# Patient Record
Sex: FEMALE | Race: BLACK OR AFRICAN AMERICAN | NOT HISPANIC OR LATINO | Employment: OTHER | URBAN - METROPOLITAN AREA
[De-identification: names, ages, dates, MRNs, and addresses within clinical notes are randomized per-mention and may not be internally consistent; named-entity substitution may affect disease eponyms.]

---

## 2017-01-18 ENCOUNTER — HOSPITAL ENCOUNTER (OUTPATIENT)
Dept: LAB | Facility: HOSPITAL | Age: 72
Setting detail: SPECIMEN
Discharge: HOME | End: 2017-01-18
Attending: INTERNAL MEDICINE | Admitting: INTERNAL MEDICINE

## 2017-01-18 LAB
ALBUMIN SERPL-MCNC: 3.4 G/DL (ref 3.5–4.8)
ALBUMIN/GLOB SERPL: 1.1 {RATIO} (ref 1–1.7)
ALP SERPL-CCNC: 70 IU/L (ref 32–91)
ALT SERPL-CCNC: 17 IU/L (ref 14–54)
ANION GAP SERPL CALC-SCNC: 11.3 MMOL/L (ref 10–20)
AST SERPL-CCNC: 20 IU/L (ref 15–41)
BILIRUB SERPL-MCNC: 0.6 MG/DL (ref 0.3–1.2)
BUN SERPL-MCNC: 25 MG/DL (ref 8–20)
BUN/CREAT SERPL: 20.8 (ref 5.4–26.2)
CALCIUM SERPL-MCNC: 9.6 MG/DL (ref 8.9–10.3)
CHLORIDE SERPL-SCNC: 109 MMOL/L (ref 101–111)
CHOLEST SERPL-MCNC: 111 MG/DL
CHOLEST/HDLC SERPL: 2.1 {RATIO}
CONV CO2: 24 MMOL/L (ref 22–32)
CONV LDL CHOLESTEROL DIRECT: 41 MG/DL (ref 0–100)
CONV TOTAL PROTEIN: 6.5 G/DL (ref 6.1–7.9)
CREAT UR-MCNC: 1.2 MG/DL (ref 0.4–1)
GLOBULIN UR ELPH-MCNC: 3.1 G/DL (ref 2.5–3.8)
GLUCOSE SERPL-MCNC: 79 MG/DL (ref 65–99)
HDLC SERPL-MCNC: 53 MG/DL
LDLC/HDLC SERPL: 0.8 {RATIO}
LIPID INTERPRETATION: NORMAL
POTASSIUM SERPL-SCNC: 5.3 MMOL/L (ref 3.6–5.1)
SODIUM SERPL-SCNC: 139 MMOL/L (ref 136–144)
TRIGL SERPL-MCNC: 75 MG/DL
VLDLC SERPL CALC-MCNC: 17.6 MG/DL

## 2017-05-03 ENCOUNTER — HOSPITAL ENCOUNTER (OUTPATIENT)
Dept: GENERAL RADIOLOGY | Facility: HOSPITAL | Age: 72
Discharge: HOME | End: 2017-05-03
Attending: FAMILY MEDICINE | Admitting: FAMILY MEDICINE

## 2017-05-25 ENCOUNTER — HOSPITAL ENCOUNTER (OUTPATIENT)
Dept: PAIN MEDICINE | Facility: HOSPITAL | Age: 72
Discharge: HOME | End: 2017-05-25
Attending: ANESTHESIOLOGY | Admitting: ANESTHESIOLOGY

## 2017-06-13 ENCOUNTER — HOSPITAL ENCOUNTER (OUTPATIENT)
Dept: LAB | Facility: HOSPITAL | Age: 72
Setting detail: SPECIMEN
Discharge: HOME | End: 2017-06-13
Attending: NURSE PRACTITIONER | Admitting: NURSE PRACTITIONER

## 2017-06-13 LAB
ALBUMIN SERPL-MCNC: 3.4 G/DL (ref 3.5–4.8)
ALBUMIN/GLOB SERPL: 0.9 {RATIO} (ref 1–1.7)
ALP SERPL-CCNC: 67 IU/L (ref 32–91)
ALT SERPL-CCNC: 13 IU/L (ref 14–54)
ANION GAP SERPL CALC-SCNC: 13.6 MMOL/L (ref 10–20)
AST SERPL-CCNC: 20 IU/L (ref 15–41)
BILIRUB SERPL-MCNC: 0.4 MG/DL (ref 0.3–1.2)
BUN SERPL-MCNC: 21 MG/DL (ref 8–20)
BUN/CREAT SERPL: 21 (ref 5.4–26.2)
CALCIUM SERPL-MCNC: 9.4 MG/DL (ref 8.9–10.3)
CHLORIDE SERPL-SCNC: 107 MMOL/L (ref 101–111)
CONV CO2: 26 MMOL/L (ref 22–32)
CONV TOTAL PROTEIN: 7 G/DL (ref 6.1–7.9)
CREAT UR-MCNC: 1 MG/DL (ref 0.4–1)
GLOBULIN UR ELPH-MCNC: 3.6 G/DL (ref 2.5–3.8)
GLUCOSE SERPL-MCNC: 106 MG/DL (ref 65–99)
POTASSIUM SERPL-SCNC: 4.6 MMOL/L (ref 3.6–5.1)
SODIUM SERPL-SCNC: 142 MMOL/L (ref 136–144)

## 2017-06-26 ENCOUNTER — HOSPITAL ENCOUNTER (OUTPATIENT)
Dept: PAIN MEDICINE | Facility: HOSPITAL | Age: 72
Discharge: HOME | End: 2017-06-26
Attending: ANESTHESIOLOGY | Admitting: ANESTHESIOLOGY

## 2017-06-26 LAB
AMPHETAMINES UR QL SCN: NEGATIVE
BZE UR QL SCN: NEGATIVE
CREAT 24H UR-MCNC: NORMAL MG/DL
METHADONE UR QL SCN: NEGATIVE
OPIATE CONFIRMATION URINE: NORMAL
THC SERPLBLD CFM-MCNC: NEGATIVE NG/ML

## 2017-07-03 ENCOUNTER — HOSPITAL ENCOUNTER (OUTPATIENT)
Dept: ORTHOPEDIC SURGERY | Facility: CLINIC | Age: 72
Discharge: HOME | End: 2017-07-03
Attending: PODIATRIST | Admitting: PODIATRIST

## 2017-07-27 ENCOUNTER — HOSPITAL ENCOUNTER (OUTPATIENT)
Dept: ORTHOPEDIC SURGERY | Facility: CLINIC | Age: 72
Discharge: HOME | End: 2017-07-27
Attending: ORTHOPAEDIC SURGERY | Admitting: ORTHOPAEDIC SURGERY

## 2017-08-04 ENCOUNTER — HOSPITAL ENCOUNTER (OUTPATIENT)
Dept: ORTHOPEDIC SURGERY | Facility: CLINIC | Age: 72
Setting detail: SPECIMEN
Discharge: HOME | End: 2017-08-04
Attending: PHYSICIAN ASSISTANT | Admitting: PHYSICIAN ASSISTANT

## 2017-08-23 ENCOUNTER — HOSPITAL ENCOUNTER (OUTPATIENT)
Dept: MAMMOGRAPHY | Facility: HOSPITAL | Age: 72
Discharge: HOME | End: 2017-08-23
Attending: PHYSICIAN ASSISTANT | Admitting: PHYSICIAN ASSISTANT

## 2017-09-11 ENCOUNTER — HOSPITAL ENCOUNTER (OUTPATIENT)
Dept: INFUSION THERAPY | Facility: HOSPITAL | Age: 72
Discharge: HOME | End: 2017-09-11
Attending: PHYSICIAN ASSISTANT | Admitting: PHYSICIAN ASSISTANT

## 2017-09-29 ENCOUNTER — HOSPITAL ENCOUNTER (OUTPATIENT)
Dept: WOUND CARE | Facility: HOSPITAL | Age: 72
Discharge: HOME | End: 2017-09-29
Attending: INTERNAL MEDICINE | Admitting: INTERNAL MEDICINE

## 2017-10-13 ENCOUNTER — HOSPITAL ENCOUNTER (OUTPATIENT)
Dept: WOUND CARE | Facility: HOSPITAL | Age: 72
Discharge: HOME | End: 2017-10-13
Attending: INTERNAL MEDICINE | Admitting: INTERNAL MEDICINE

## 2017-10-23 ENCOUNTER — HOSPITAL ENCOUNTER (OUTPATIENT)
Dept: CARDIOLOGY | Facility: HOSPITAL | Age: 72
Discharge: HOME | End: 2017-10-23
Attending: FAMILY MEDICINE | Admitting: FAMILY MEDICINE

## 2017-10-27 ENCOUNTER — HOSPITAL ENCOUNTER (OUTPATIENT)
Dept: FAMILY MEDICINE CLINIC | Facility: CLINIC | Age: 72
Setting detail: SPECIMEN
Discharge: HOME | End: 2017-10-27
Attending: FAMILY MEDICINE | Admitting: FAMILY MEDICINE

## 2017-10-27 ENCOUNTER — HOSPITAL ENCOUNTER (OUTPATIENT)
Dept: WOUND CARE | Facility: HOSPITAL | Age: 72
Discharge: HOME | End: 2017-10-27
Attending: NURSE PRACTITIONER | Admitting: NURSE PRACTITIONER

## 2017-10-27 LAB
BACTERIA SPEC AEROBE CULT: NORMAL
Lab: NORMAL
MICRO REPORT STATUS: NORMAL
SPECIMEN SOURCE: NORMAL

## 2017-11-10 ENCOUNTER — HOSPITAL ENCOUNTER (OUTPATIENT)
Dept: WOUND CARE | Facility: HOSPITAL | Age: 72
Discharge: HOME | End: 2017-11-10
Attending: NURSE PRACTITIONER | Admitting: NURSE PRACTITIONER

## 2017-12-05 ENCOUNTER — HOSPITAL ENCOUNTER (OUTPATIENT)
Dept: LAB | Facility: HOSPITAL | Age: 72
Setting detail: SPECIMEN
Discharge: HOME | End: 2017-12-05
Attending: INTERNAL MEDICINE | Admitting: INTERNAL MEDICINE

## 2017-12-05 LAB
ALBUMIN SERPL-MCNC: 3.4 G/DL (ref 3.5–4.8)
ALBUMIN/GLOB SERPL: 1 {RATIO} (ref 1–1.7)
ALP SERPL-CCNC: 45 IU/L (ref 32–91)
ALT SERPL-CCNC: 12 IU/L (ref 14–54)
ANION GAP SERPL CALC-SCNC: 10 MMOL/L (ref 10–20)
AST SERPL-CCNC: 17 IU/L (ref 15–41)
BILIRUB SERPL-MCNC: 0.4 MG/DL (ref 0.3–1.2)
BUN SERPL-MCNC: 39 MG/DL (ref 8–20)
BUN/CREAT SERPL: 19.5 (ref 5.4–26.2)
CALCIUM SERPL-MCNC: 8.8 MG/DL (ref 8.9–10.3)
CHLORIDE SERPL-SCNC: 108 MMOL/L (ref 101–111)
CHOLEST SERPL-MCNC: 116 MG/DL
CHOLEST/HDLC SERPL: 2.3 {RATIO}
CONV CO2: 26 MMOL/L (ref 22–32)
CONV LDL CHOLESTEROL DIRECT: 49 MG/DL (ref 0–100)
CONV TOTAL PROTEIN: 6.9 G/DL (ref 6.1–7.9)
CREAT UR-MCNC: 2 MG/DL (ref 0.4–1)
GLOBULIN UR ELPH-MCNC: 3.5 G/DL (ref 2.5–3.8)
GLUCOSE SERPL-MCNC: 92 MG/DL (ref 65–99)
HDLC SERPL-MCNC: 51 MG/DL
LDLC/HDLC SERPL: 1 {RATIO}
LIPID INTERPRETATION: NORMAL
POTASSIUM SERPL-SCNC: 5 MMOL/L (ref 3.6–5.1)
SODIUM SERPL-SCNC: 139 MMOL/L (ref 136–144)
TRIGL SERPL-MCNC: 81 MG/DL
VLDLC SERPL CALC-MCNC: 15.5 MG/DL

## 2017-12-12 ENCOUNTER — HOSPITAL ENCOUNTER (OUTPATIENT)
Dept: LAB | Facility: HOSPITAL | Age: 72
Setting detail: SPECIMEN
Discharge: HOME | End: 2017-12-12
Attending: INTERNAL MEDICINE | Admitting: INTERNAL MEDICINE

## 2017-12-13 LAB
ANION GAP SERPL CALC-SCNC: 10.6 MMOL/L (ref 10–20)
BUN SERPL-MCNC: 36 MG/DL (ref 8–20)
BUN/CREAT SERPL: 22.5 (ref 5.4–26.2)
CALCIUM SERPL-MCNC: 9.1 MG/DL (ref 8.9–10.3)
CHLORIDE SERPL-SCNC: 108 MMOL/L (ref 101–111)
CONV CO2: 24 MMOL/L (ref 22–32)
CREAT UR-MCNC: 1.6 MG/DL (ref 0.4–1)
GLUCOSE SERPL-MCNC: 84 MG/DL (ref 65–99)
POTASSIUM SERPL-SCNC: 4.6 MMOL/L (ref 3.6–5.1)
SODIUM SERPL-SCNC: 138 MMOL/L (ref 136–144)

## 2018-03-19 ENCOUNTER — HOSPITAL ENCOUNTER (OUTPATIENT)
Dept: LAB | Facility: HOSPITAL | Age: 73
Setting detail: SPECIMEN
Discharge: HOME | End: 2018-03-19
Attending: NURSE PRACTITIONER | Admitting: NURSE PRACTITIONER

## 2018-07-03 ENCOUNTER — HOSPITAL ENCOUNTER (OUTPATIENT)
Dept: FAMILY MEDICINE CLINIC | Facility: CLINIC | Age: 73
Setting detail: SPECIMEN
Discharge: HOME | End: 2018-07-03
Attending: FAMILY MEDICINE | Admitting: FAMILY MEDICINE

## 2018-07-03 LAB
BASOPHILS # BLD AUTO: 0 10*3/UL (ref 0–0.2)
BASOPHILS NFR BLD AUTO: 0 % (ref 0–2)
DIFFERENTIAL METHOD BLD: (no result)
EOSINOPHIL # BLD AUTO: 0.2 10*3/UL (ref 0–0.3)
EOSINOPHIL # BLD AUTO: 4 % (ref 0–3)
ERYTHROCYTE [DISTWIDTH] IN BLOOD BY AUTOMATED COUNT: 13.9 % (ref 11.5–14.5)
HCT VFR BLD AUTO: 32.5 % (ref 35–49)
HGB BLD-MCNC: 10.6 G/DL (ref 12–15)
IRON SERPL-MCNC: 66 UG/DL (ref 28–170)
LYMPHOCYTES # BLD AUTO: 1.6 10*3/UL (ref 0.8–4.8)
LYMPHOCYTES NFR BLD AUTO: 25 % (ref 18–42)
MCH RBC QN AUTO: 27.8 PG (ref 26–32)
MCHC RBC AUTO-ENTMCNC: 32.8 G/DL (ref 32–36)
MCV RBC AUTO: 84.9 FL (ref 80–94)
MONOCYTES # BLD AUTO: 0.5 10*3/UL (ref 0.1–1.3)
MONOCYTES NFR BLD AUTO: 8 % (ref 2–11)
NEUTROPHILS # BLD AUTO: 4 10*3/UL (ref 2.3–8.6)
NEUTROPHILS NFR BLD AUTO: 63 % (ref 50–75)
NRBC BLD AUTO-RTO: 0 /100{WBCS}
NRBC/RBC NFR BLD MANUAL: 0 10*3/UL
PLATELET # BLD AUTO: 238 10*3/UL (ref 150–450)
PMV BLD AUTO: 9.5 FL (ref 7.4–10.4)
RBC # BLD AUTO: 3.82 10*6/UL (ref 4–5.4)
WBC # BLD AUTO: 6.3 10*3/UL (ref 4.5–11.5)

## 2018-10-18 ENCOUNTER — HOSPITAL ENCOUNTER (OUTPATIENT)
Dept: LAB | Facility: HOSPITAL | Age: 73
Setting detail: SPECIMEN
Discharge: HOME | End: 2018-10-18
Attending: INTERNAL MEDICINE | Admitting: INTERNAL MEDICINE

## 2018-10-18 LAB
ALBUMIN SERPL-MCNC: 3 G/DL (ref 3.5–4.8)
ALBUMIN/GLOB SERPL: 0.9 {RATIO} (ref 1–1.7)
ALP SERPL-CCNC: 68 IU/L (ref 32–91)
ALT SERPL-CCNC: 17 IU/L (ref 14–54)
ANION GAP SERPL CALC-SCNC: 11.9 MMOL/L (ref 10–20)
AST SERPL-CCNC: 21 IU/L (ref 15–41)
BILIRUB SERPL-MCNC: 0.5 MG/DL (ref 0.3–1.2)
BUN SERPL-MCNC: 33 MG/DL (ref 8–20)
BUN/CREAT SERPL: 22 (ref 5.4–26.2)
CALCIUM SERPL-MCNC: 9 MG/DL (ref 8.9–10.3)
CHLORIDE SERPL-SCNC: 108 MMOL/L (ref 101–111)
CONV CO2: 24 MMOL/L (ref 22–32)
CONV MICROALBUM.,U,RANDOM: 1111 MG/L
CONV TOTAL PROTEIN: 6.4 G/DL (ref 6.1–7.9)
CREAT 24H UR-MCNC: 41.1 MG/DL
CREAT UR-MCNC: 1.5 MG/DL (ref 0.4–1)
GLOBULIN UR ELPH-MCNC: 3.4 G/DL (ref 2.5–3.8)
GLUCOSE SERPL-MCNC: 134 MG/DL (ref 65–99)
MICROALBUMIN/CREAT UR: 2703.2 UG/MG
POTASSIUM SERPL-SCNC: 3.9 MMOL/L (ref 3.6–5.1)
SODIUM SERPL-SCNC: 140 MMOL/L (ref 136–144)

## 2018-10-19 LAB
25(OH)D3 SERPL-MCNC: 22 NG/ML (ref 30–100)
HBA1C MFR BLD: 5.7 % (ref 0–5.6)

## 2018-12-06 ENCOUNTER — HOSPITAL ENCOUNTER (OUTPATIENT)
Dept: ULTRASOUND IMAGING | Facility: HOSPITAL | Age: 73
Discharge: HOME | End: 2018-12-06
Attending: INTERNAL MEDICINE | Admitting: INTERNAL MEDICINE

## 2018-12-13 ENCOUNTER — HOSPITAL ENCOUNTER (OUTPATIENT)
Dept: LAB | Facility: HOSPITAL | Age: 73
Discharge: HOME | End: 2018-12-13
Attending: INTERNAL MEDICINE | Admitting: INTERNAL MEDICINE

## 2018-12-13 LAB
ANION GAP SERPL CALC-SCNC: 11.5 MMOL/L (ref 10–20)
BACTERIA SPEC AEROBE CULT: NORMAL
BASOPHILS # BLD AUTO: 0 10*3/UL (ref 0–0.2)
BASOPHILS NFR BLD AUTO: 0 % (ref 0–2)
BILIRUB UR QL STRIP: NEGATIVE MG/DL
BUN SERPL-MCNC: 45 MG/DL (ref 8–20)
BUN/CREAT SERPL: 23.7 (ref 5.4–26.2)
CALCIUM SERPL-MCNC: 9.4 MG/DL (ref 8.9–10.3)
CASTS URNS QL MICRO: ABNORMAL /[LPF]
CHLORIDE SERPL-SCNC: 107 MMOL/L (ref 101–111)
COLOR UR: YELLOW
CONV BACTERIA IN URINE MICRO: ABNORMAL
CONV CLARITY OF URINE: CLEAR
CONV CO2: 23 MMOL/L (ref 22–32)
CONV HYALINE CASTS IN URINE MICRO: 2 /[LPF] (ref 0–5)
CONV PROTEIN IN URINE BY AUTOMATED TEST STRIP: 30 MG/DL
CONV SMALL ROUND CELLS: ABNORMAL /[HPF]
CONV UROBILINOGEN IN URINE BY AUTOMATED TEST STRIP: 0.2 MG/DL
CREAT 24H UR-MCNC: 43.2 MG/DL
CREAT UR-MCNC: 1.9 MG/DL (ref 0.4–1)
CULTURE INDICATED?: ABNORMAL
DIFFERENTIAL METHOD BLD: (no result)
EOSINOPHIL # BLD AUTO: 0.4 10*3/UL (ref 0–0.3)
EOSINOPHIL # BLD AUTO: 6 % (ref 0–3)
ERYTHROCYTE [DISTWIDTH] IN BLOOD BY AUTOMATED COUNT: 13.9 % (ref 11.5–14.5)
GLUCOSE SERPL-MCNC: 87 MG/DL (ref 65–99)
GLUCOSE UR QL: NEGATIVE MG/DL
HCT VFR BLD AUTO: 30.1 % (ref 35–49)
HGB BLD-MCNC: 10 G/DL (ref 12–15)
HGB UR QL STRIP: ABNORMAL
KETONES UR QL STRIP: NEGATIVE MG/DL
LEUKOCYTE ESTERASE UR QL STRIP: NEGATIVE
LYMPHOCYTES # BLD AUTO: 1.5 10*3/UL (ref 0.8–4.8)
LYMPHOCYTES NFR BLD AUTO: 20 % (ref 18–42)
Lab: NORMAL
MCH RBC QN AUTO: 29.3 PG (ref 26–32)
MCHC RBC AUTO-ENTMCNC: 33.3 G/DL (ref 32–36)
MCV RBC AUTO: 88.1 FL (ref 80–94)
MICRO REPORT STATUS: NORMAL
MONOCYTES # BLD AUTO: 0.6 10*3/UL (ref 0.1–1.3)
MONOCYTES NFR BLD AUTO: 8 % (ref 2–11)
NEUTROPHILS # BLD AUTO: 5.2 10*3/UL (ref 2.3–8.6)
NEUTROPHILS NFR BLD AUTO: 66 % (ref 50–75)
NITRITE UR QL STRIP: NEGATIVE
NRBC BLD AUTO-RTO: 0 /100{WBCS}
NRBC/RBC NFR BLD MANUAL: 0 10*3/UL
PH UR STRIP.AUTO: 5 [PH] (ref 4.5–8)
PLATELET # BLD AUTO: 205 10*3/UL (ref 150–450)
PMV BLD AUTO: 9 FL (ref 7.4–10.4)
POTASSIUM SERPL-SCNC: 4.5 MMOL/L (ref 3.6–5.1)
PROT UR-MCNC: 32 MG/DL
PROT/CREAT UR: 0.7 MG/MG (ref 0–22)
RBC # BLD AUTO: 3.41 10*6/UL (ref 4–5.4)
RBC #/AREA URNS HPF: 4 /[HPF] (ref 0–3)
SODIUM SERPL-SCNC: 137 MMOL/L (ref 136–144)
SP GR UR: 1.01 (ref 1–1.03)
SPECIMEN SOURCE: NORMAL
SPERM URNS QL MICRO: ABNORMAL /[HPF]
SQUAMOUS SPT QL MICRO: 4 /[HPF] (ref 0–5)
UNIDENT CRYS URNS QL MICRO: ABNORMAL /[HPF]
WBC # BLD AUTO: 7.7 10*3/UL (ref 4.5–11.5)
WBC #/AREA URNS HPF: 1 /[HPF] (ref 0–5)
YEAST SPEC QL WET PREP: ABNORMAL /[HPF]

## 2019-04-18 ENCOUNTER — HOSPITAL ENCOUNTER (OUTPATIENT)
Dept: LAB | Facility: HOSPITAL | Age: 74
Setting detail: SPECIMEN
Discharge: HOME | End: 2019-04-18
Attending: NURSE PRACTITIONER | Admitting: NURSE PRACTITIONER

## 2019-04-18 LAB
ALBUMIN SERPL-MCNC: 3.5 G/DL (ref 3.5–4.8)
ALBUMIN/GLOB SERPL: 0.9 {RATIO} (ref 1–1.7)
ALP SERPL-CCNC: 51 IU/L (ref 32–91)
ALT SERPL-CCNC: 46 IU/L (ref 14–54)
ANION GAP SERPL CALC-SCNC: 15.5 MMOL/L (ref 10–20)
AST SERPL-CCNC: 29 IU/L (ref 15–41)
BILIRUB SERPL-MCNC: 0.5 MG/DL (ref 0.3–1.2)
BUN SERPL-MCNC: 73 MG/DL (ref 8–20)
BUN/CREAT SERPL: 34.8 (ref 5.4–26.2)
CALCIUM SERPL-MCNC: 9 MG/DL (ref 8.9–10.3)
CHLORIDE SERPL-SCNC: 107 MMOL/L (ref 101–111)
CHOLEST SERPL-MCNC: 153 MG/DL
CHOLEST/HDLC SERPL: 2.6 {RATIO}
CONV CO2: 21 MMOL/L (ref 22–32)
CONV LDL CHOLESTEROL DIRECT: 67 MG/DL (ref 0–100)
CONV TOTAL PROTEIN: 7.3 G/DL (ref 6.1–7.9)
CREAT UR-MCNC: 2.1 MG/DL (ref 0.4–1)
GLOBULIN UR ELPH-MCNC: 3.8 G/DL (ref 2.5–3.8)
GLUCOSE SERPL-MCNC: 135 MG/DL (ref 65–99)
HDLC SERPL-MCNC: 58 MG/DL
LDLC/HDLC SERPL: 1.2 {RATIO}
LIPID INTERPRETATION: ABNORMAL
POTASSIUM SERPL-SCNC: 4.5 MMOL/L (ref 3.6–5.1)
SODIUM SERPL-SCNC: 139 MMOL/L (ref 136–144)
TRIGL SERPL-MCNC: 89 MG/DL
VLDLC SERPL CALC-MCNC: 28.4 MG/DL

## 2019-04-25 ENCOUNTER — HOSPITAL ENCOUNTER (OUTPATIENT)
Dept: LAB | Facility: HOSPITAL | Age: 74
Setting detail: SPECIMEN
Discharge: HOME | End: 2019-04-25
Attending: INTERNAL MEDICINE | Admitting: INTERNAL MEDICINE

## 2019-04-25 LAB
ANION GAP SERPL CALC-SCNC: 16.4 MMOL/L (ref 10–20)
BUN SERPL-MCNC: 58 MG/DL (ref 8–20)
BUN/CREAT SERPL: 30.5 (ref 5.4–26.2)
CALCIUM SERPL-MCNC: 9.1 MG/DL (ref 8.9–10.3)
CHLORIDE SERPL-SCNC: 106 MMOL/L (ref 101–111)
CONV CO2: 20 MMOL/L (ref 22–32)
CREAT UR-MCNC: 1.9 MG/DL (ref 0.4–1)
GLUCOSE SERPL-MCNC: 153 MG/DL (ref 65–99)
POTASSIUM SERPL-SCNC: 4.4 MMOL/L (ref 3.6–5.1)
SODIUM SERPL-SCNC: 138 MMOL/L (ref 136–144)

## 2019-04-30 ENCOUNTER — HOSPITAL ENCOUNTER (OUTPATIENT)
Dept: ULTRASOUND IMAGING | Facility: HOSPITAL | Age: 74
Discharge: HOME | End: 2019-04-30
Attending: INTERNAL MEDICINE | Admitting: INTERNAL MEDICINE

## 2019-05-17 ENCOUNTER — HOSPITAL ENCOUNTER (OUTPATIENT)
Dept: LAB | Facility: HOSPITAL | Age: 74
Setting detail: SPECIMEN
Discharge: HOME | End: 2019-05-17
Attending: INTERNAL MEDICINE | Admitting: INTERNAL MEDICINE

## 2019-05-17 LAB
ALBUMIN SERPL-MCNC: 3.6 G/DL (ref 3.5–4.8)
ALBUMIN/GLOB SERPL: 1 {RATIO} (ref 1–1.7)
ALP SERPL-CCNC: 50 IU/L (ref 32–91)
ALT SERPL-CCNC: 22 IU/L (ref 14–54)
ANION GAP SERPL CALC-SCNC: 14 MMOL/L (ref 10–20)
AST SERPL-CCNC: 24 IU/L (ref 15–41)
BILIRUB SERPL-MCNC: 0.6 MG/DL (ref 0.3–1.2)
BUN SERPL-MCNC: 37 MG/DL (ref 8–20)
BUN/CREAT SERPL: 24.7 (ref 5.4–26.2)
CALCIUM SERPL-MCNC: 8.8 MG/DL (ref 8.9–10.3)
CHLORIDE SERPL-SCNC: 110 MMOL/L (ref 101–111)
CONV CO2: 20 MMOL/L (ref 22–32)
CONV TOTAL PROTEIN: 7.2 G/DL (ref 6.1–7.9)
CREAT UR-MCNC: 1.5 MG/DL (ref 0.4–1)
GLOBULIN UR ELPH-MCNC: 3.6 G/DL (ref 2.5–3.8)
GLUCOSE SERPL-MCNC: 112 MG/DL (ref 65–99)
POTASSIUM SERPL-SCNC: 5 MMOL/L (ref 3.6–5.1)
SODIUM SERPL-SCNC: 139 MMOL/L (ref 136–144)

## 2019-05-24 ENCOUNTER — CONVERSION ENCOUNTER (OUTPATIENT)
Dept: FAMILY MEDICINE CLINIC | Facility: CLINIC | Age: 74
End: 2019-05-24

## 2019-06-01 ENCOUNTER — TRANSCRIBE ORDERS (OUTPATIENT)
Dept: ADMINISTRATIVE | Facility: HOSPITAL | Age: 74
End: 2019-06-01

## 2019-06-01 DIAGNOSIS — Z13.820 OSTEOPOROSIS SCREENING: Primary | ICD-10-CM

## 2019-06-04 VITALS
HEIGHT: 68 IN | BODY MASS INDEX: 41.98 KG/M2 | DIASTOLIC BLOOD PRESSURE: 84 MMHG | WEIGHT: 277 LBS | HEART RATE: 56 BPM | OXYGEN SATURATION: 98 % | SYSTOLIC BLOOD PRESSURE: 140 MMHG

## 2019-06-06 NOTE — PROGRESS NOTES
Visit Type:  Follow-up Visit  Referring Provider:  Rashawn Garibay MD  Primary Provider:  Phoenix Morocho MD    CC:  dm2 and follow-up of diabetes Type 2.    History of Present Illness:  73  yrss old female who presents with follow-up of diabetes Type 2, HTN, HLD and Thyroid nodule.    She is on Januvia 25 mg po daily. She tells me her morning numbers are below 100.  The patient  denies fatigue, polyuria, polydipsia, polyphagia, nocturia, hypoglycemia requiring assistance, hypoglycemic unawareness, weight gain and blurred vision.    Associated symptoms noted include diarrhea.  The patient denies any nausea and vomiting.  Since the last visit the patient admits to HBGM testing:, dietary compliance is good and complying with medications.  Patient notes eye care since last visit   including seen by ophthalmologist.  Complications from diabetes include DM Neuropathy.    HTN: Well controlled on losartan 100 mg amlodipine 5 mg daily.   HLD: Well controlled on Atorvastatin 20 mg daily.    CKD: Her serum creatinine is improving since she has been off losartan.  Most recent serum creatinine is 1.5.  Thyroid nodules:  She has 10 mm nodule on the right side and a 12 mm on the left side, no family history of thyroid cancer or radiation exposure.      Standards of Care   Discussed Carrying Glucose Source: Yes  Discussed Wear DM Alert ID: Advised  Discussed ASA Use: Yes  Last Eye Exam: 2019  Influenza vaccine: refused  Pneumovax: refused      Past Medical History:     Reviewed history from 01/03/2019 and no changes required:        Diabetes, Type 2        Hypertension        Glaucoma        knee pain,R        Low Back Pain        legally blind        No Drug Allergies?         Osteopenia (DEXA 8/2017)- on prolia        Complete Prolaps bladder and vaginal 2017        Hyperlipidemia    Past Surgical History:     Reviewed history from 04/26/2018 and no changes required:        Cataract Extraction        Hysterectomy  1/2018    Family History Summary:      Reviewed history Last on 04/25/2019 and no changes required:05/24/2019  Mother - Has Family History of Breast Cancer - Entered On: 7/14/2016      Social History:     Reviewed history from 10/16/2018 and no changes required:        Patient has never smoked.        Passive Smoke: N        Alcohol Use: N        Drug Use: N        HIV/High Risk: N        Regular Exercise: N                Risk Factors:     Smoked Tobacco Use:  Never smoker  Smokeless Tobacco Use:  Never  Passive smoke exposure:  no  Drug use:  no  HIV high-risk behavior:  no  Caffeine use:  0 drinks per day  Alcohol use:  no  Exercise:  no  Seatbelt use:  100 %  Sun Exposure:  occasionally    Previous Tobacco Use: Signed On - 04/25/2019  Smoked Tobacco Use:  Never smoker  Smokeless Tobacco Use:  Never  Passive smoke exposure:  no  Drug use:  no  HIV high-risk behavior:  no  Caffeine use:  0 drinks per day    Previous Alcohol Use: Signed On - 04/25/2019  Alcohol use:  no  Exercise:  no  Seatbelt use:  100 %  Sun Exposure:  occasionally    Active Medications (reviewed today):  ERGOCALCIFEROL 88310 UNIT ORAL CAPSULE (ERGOCALCIFEROL) 1 po qweek  PROLIA 60 MG/ML SUBCUTANEOUS SOLUTION (DENOSUMAB) q 6 months  JANUVIA 25 MG ORAL TABLET (SITAGLIPTIN PHOSPHATE) one tab po daily  ATORVASTATIN 20MG^ (ATORVASTATIN CALCIUM) TAKE ONE TABLET BY MOUTH AT BEDTIME  ATENOLOL 100MG^ (ATENOLOL) TAKE ONE TABLET BY MOUTH EVERY DAY  ASPIRIN LOW 81MG EC-- (ASPIRIN) TAKE ONE TABLET BY MOUTH EVERY DAY  AMLODIPINE BESYLATE 10 MG ORAL TABLET (AMLODIPINE BESYLATE) Take 1 tablet by mouth daily  FERROUS SULF 325MG^ (FERROUS SULFATE) TAKE ONE TABLET BY MOUTH EVERY DAY  TYLENOL 325 MG ORAL TABLET (ACETAMINOPHEN) Takes 2 as needed  CALCIUM CITRATE + D3 315-250 MG-UNIT ORAL TABLET (CALCIUM CITRATE-VITAMIN D) 2  po BID  RAMU MICROLET LANCETS (LANCETS) Test blood sugars twice a day as needed. DX: E11.65  RAMU CONTOUR NEXT TEST IN VITRO STRIP (GLUCOSE  BLOOD) test blood sugars twice a day as needed. DX: E11.65    Current Allergies (reviewed today):  No known allergies    Current Medications (including medications started today):   ERGOCALCIFEROL 80802 UNIT ORAL CAPSULE (ERGOCALCIFEROL) 1 po qweek  PROLIA 60 MG/ML SUBCUTANEOUS SOLUTION (DENOSUMAB) q 6 months  JANUVIA 25 MG ORAL TABLET (SITAGLIPTIN PHOSPHATE) one tab po daily  ATORVASTATIN 20MG^ (ATORVASTATIN CALCIUM) TAKE ONE TABLET BY MOUTH AT BEDTIME  ATENOLOL 100MG^ (ATENOLOL) TAKE ONE TABLET BY MOUTH EVERY DAY  ASPIRIN LOW 81MG EC-- (ASPIRIN) TAKE ONE TABLET BY MOUTH EVERY DAY  AMLODIPINE BESYLATE 10 MG ORAL TABLET (AMLODIPINE BESYLATE) Take 1 tablet by mouth daily  FERROUS SULF 325MG^ (FERROUS SULFATE) TAKE ONE TABLET BY MOUTH EVERY DAY  TYLENOL 325 MG ORAL TABLET (ACETAMINOPHEN) Takes 2 as needed  CALCIUM CITRATE + D3 315-250 MG-UNIT ORAL TABLET (CALCIUM CITRATE-VITAMIN D) 2  po BID  RAMU MICROLET LANCETS (LANCETS) Test blood sugars twice a day as needed. DX: E11.65  RAMU CONTOUR NEXT TEST IN VITRO STRIP (GLUCOSE BLOOD) test blood sugars twice a day as needed. DX: E11.65      Review of Systems     General       Denies sweating and fatigue.    Eyes       Denies double vision and blurring.    ENT       Denies difficulty swallowing and hoarseness.    CV       Denies chest pain or discomfort and shortness of breath with exertion.    GI       Denies nausea and vomiting.    MS       Denies muscle cramps and muscle aches.    Derm       Denies dryness.    Neuro       Denies headaches.    Psych       Denies anxiety and depression.    Endo       Denies cold intolerance, heat intolerance and weight change.      Vital Signs:    Patient Profile:    73 Years Old Female  Height:     68 inches  Weight:     277 pounds  BMI:        42.11     O2 Sat:     98 %  Pulse rate: 56 / minute  BP Sittin / 84  (left arm)        Problems: Active problems were reviewed with the patient during this visit.  Medications: Medications  were reviewed with the patient during this visit.  Allergies: Allergies were reviewed with the patient during this visit.  No Known Allergy.        Vitals Entered By: Annabel Cisneros (May 24, 2019 9:54 AM)      Physical Exam    General: Well developed, well nourished, NAD  Eyes: Pink conjunctivae, No ptosis.   Neck: No masses, No thyromegaly, trachea midline.  Lungs: CTA with normal respiratory effort  CV: RRR, no murmur rub or gallop.  Musculoskeletal: Normal gait and station. No digital cyanosis.  Extremities: No clubbing, cyanosis, edema, or deformity.   Neurologic: No focal deficits. Cranial nerves intact.  Skin: Warm and dry, No lesions or rashes  Psych: AAOx3 with appropraite affect        Blood Pressure:  Today's BP: 140/84 mm Hg    Labwork:   Most Recent Lab Results:   LDL: 67 mg/dL 04/18/2019  HbA1c: : 6.1 % 04/18/2019      Impression & Recommendations:    Problem # 1:  Diabetes mellitus, type II, uncontrolled (ICD-250.02) (BLV93-T65.65)    Excellent control, continue current medications.  Will follow A1c.  Her updated medication list for this problem includes:     Januvia 25 Mg Oral Tablet (Sitagliptin phosphate) ..... One tab po daily  HgbA1C: 6.1 (04/18/2019)  Cholesterol: 153 (04/18/2019)   HDL: 58 (04/18/2019)   LDL: 67 (04/18/2019)   Creatinine: 1.5 (05/17/2019)    Orders:  Glucose (90349)  Ofc Vst, Est Level IV (53068)  [facility] HEMOGLOBIN A1c (A1DCA)  [facility] COMPREHENSIVE METABOLIC PANEL (CMP) (MPC)  [facility] LIPID PANEL (LIPID)  [facility] MICROALBUMIN/CREATININE RATIO (MACRE)  US THYROID (CPT-56870)      Problem # 2:  Hyperlipidemia (ICD-272.4) (VBU94-A58.5)  CHOL: 153 (04/18/2019)   LDL: 67 (04/18/2019)   HDL: 58 (04/18/2019)     Orders:  Ofc Vst, Est Level IV (99227)  [facility] HEMOGLOBIN A1c (A1DCA)  [facility] COMPREHENSIVE METABOLIC PANEL (CMP) (MPC)  [facility] LIPID PANEL (LIPID)  [facility] MICROALBUMIN/CREATININE RATIO (MACRE)  US THYROID (CPT-95694)      Problem # 3:  HTN (ICD-401.9) ([id number])    Well-controlled, continue current  medications.  She developed elevated BUN and creatinine with losartan and has been off losartan.  BUN and creatinine is improving.  Her updated medication list for this problem includes:     Amlodipine Besylate 10 Mg Oral Tablet (Amlodipine besylate) ..... Take 1 tablet by mouth daily    Orders:  Ofc Vst, Est Level IV (55526)  [facility] HEMOGLOBIN A1c (A1DCA)  [facility] COMPREHENSIVE METABOLIC PANEL (CMP) (MPC)  [facility] LIPID PANEL (LIPID)  [facility] MICROALBUMIN/CREATININE RATIO (MACRE)  US THYROID (CPT-36094)      Problem # 4:  Thyroid nodule (ICD-241.0) (NQT26-Z42.1)    Ultrasound thyroid done on April 30, 2019 showed a right side 10 mm nodule in the left side 12 mm nodule.  No risk factors for thyroid cancer.  Recommend follow-up thyroid ultrasound in 6 months.  Orders:  Ofc Vst, Est Level IV (71546)  [facility] HEMOGLOBIN A1c (A1DCA)  [facility] COMPREHENSIVE METABOLIC PANEL (CMP) (MPC)  [facility] LIPID PANEL (LIPID)  [facility] MICROALBUMIN/CREATININE RATIO (MACRE)  US THYROID (CPT-51374)      Problem # 5:  Chronic kidney disease stage 3 (ICD-585.3) (IPP76-W04.3)   follows with nephrologist. Dr. Hernández.   Orders:  Ofc Vst, Est Level IV (62318)  [facility] HEMOGLOBIN A1c (A1DCA)  [facility] COMPREHENSIVE METABOLIC PANEL (CMP) (MPC)  [facility] LIPID PANEL (LIPID)  [facility] MICROALBUMIN/CREATININE RATIO (MACRE)  US THYROID (CPT-73704)        Patient Instructions:  1)  Please schedule a follow-up appointment in 6 months.  2)  Be sure to return for lab work one (1) week before your next appointment as scheduled.  3)  The importance of monitoring blood sugar regularly was reviewed.  4)  The importance of annual eye exams to prevent blindness was reviewed.  5)  The importance of monitoring urine microalbumin regularly to check for kidney damage was reviewed.  6)  The importance of proper foot care and regularly checking feet to prevent sores and possibly loss of limbs was reviewed.  7)  Reviewed how to prevent and how to treat low blood sugars.  8)  The importance of keeping the blood  pressure at or below 130/80 to prevent stroke, heart attacks, kidney failure, blindness, and loss of limbs was reviewed.                    Medication Administration    Orders Added:  1)  Glucose [51346]  2)  Ofc Vst, Est Level IV [79262]  3)  [facility] HEMOGLOBIN A1c [A1DCA]  4)  [facility] COMPREHENSIVE METABOLIC PANEL (CMP) [MPC]  5)  [facility] LIPID PANEL [LIPID]  6)  [facility] MICROALBUMIN/CREATININE RATIO [MACRE]  7)   THYROID [CPT-45424]  ]  Technician: Lamont Garcia         Date/Time Collected: May 24, 2019 9:48 AM)  Date/Time Received: May 24, 2019 9:48 AM)  Performed by: lamont garcia    Glucose (rdm):  113 mg/dL        mg/dL (normal range)                Electronically signed by Stiven Garibay MD on 05/24/2019 at 10:09 AM  Electronically signed by Rashawn Garibay MD on 05/28/2019 at 8:30 AM  ________________________________________________________________________       Disclaimer: Converted Note message may not contain all data elements that existed in the legacy source system. Please see [employer] for the original note details.

## 2019-07-30 RX ORDER — SITAGLIPTIN 25 MG/1
25 TABLET, FILM COATED ORAL DAILY
Qty: 90 TABLET | Refills: 1 | Status: SHIPPED | OUTPATIENT
Start: 2019-07-30 | End: 2019-12-27

## 2019-08-15 ENCOUNTER — TELEPHONE (OUTPATIENT)
Dept: ORTHOPEDIC SURGERY | Facility: CLINIC | Age: 74
End: 2019-08-15

## 2019-08-15 PROBLEM — M17.11 DEGENERATIVE JOINT DISEASE OF RIGHT KNEE: Status: ACTIVE | Noted: 2017-07-27

## 2019-08-15 PROBLEM — N18.30 CHRONIC RENAL INSUFFICIENCY, STAGE III (MODERATE) (HCC): Status: ACTIVE | Noted: 2018-10-26

## 2019-08-15 PROBLEM — M79.672 FOOT PAIN, LEFT: Status: ACTIVE | Noted: 2017-07-03

## 2019-08-15 PROBLEM — Z78.0 OSTEOPENIA AFTER MENOPAUSE: Status: ACTIVE | Noted: 2017-08-29

## 2019-08-15 PROBLEM — M19.90 ARTHRITIS: Status: ACTIVE | Noted: 2017-05-01

## 2019-08-15 PROBLEM — G89.29 CHRONIC LOW BACK PAIN: Status: ACTIVE | Noted: 2017-05-01

## 2019-08-15 PROBLEM — M47.817 OSTEOARTHRITIS OF LUMBOSACRAL SPINE WITHOUT MYELOPATHY: Status: ACTIVE | Noted: 2017-05-25

## 2019-08-15 PROBLEM — E11.621 DIABETIC FOOT ULCER (HCC): Status: ACTIVE | Noted: 2017-09-21

## 2019-08-15 PROBLEM — M54.16 LUMBAR RADICULITIS: Status: ACTIVE | Noted: 2017-05-25

## 2019-08-15 PROBLEM — M54.50 CHRONIC LOW BACK PAIN: Status: ACTIVE | Noted: 2017-05-01

## 2019-08-15 PROBLEM — L97.509 DIABETIC FOOT ULCER: Status: ACTIVE | Noted: 2017-09-21

## 2019-08-15 PROBLEM — M85.80 OSTEOPENIA: Status: ACTIVE | Noted: 2017-08-29

## 2019-08-15 PROBLEM — L03.039 PARONYCHIA OF TOE: Status: ACTIVE | Noted: 2017-07-03

## 2019-08-15 PROBLEM — Z51.81 ENCOUNTER FOR THERAPEUTIC DRUG MONITORING: Status: ACTIVE | Noted: 2018-10-16

## 2019-08-15 PROBLEM — M51.36 DEGENERATION OF INTERVERTEBRAL DISC OF LUMBAR REGION: Status: ACTIVE | Noted: 2017-05-25

## 2019-08-17 RX ORDER — ASPIRIN 81 MG/1
TABLET, COATED ORAL
Qty: 30 TABLET | Refills: 4 | Status: SHIPPED | OUTPATIENT
Start: 2019-08-17 | End: 2020-02-03

## 2019-08-17 RX ORDER — FERROUS SULFATE 325(65) MG
TABLET ORAL
Qty: 30 TABLET | Refills: 3 | Status: SHIPPED | OUTPATIENT
Start: 2019-08-17 | End: 2019-11-15

## 2019-08-19 RX ORDER — ATENOLOL 100 MG/1
TABLET ORAL
Qty: 30 TABLET | Refills: 3 | Status: SHIPPED | OUTPATIENT
Start: 2019-08-19 | End: 2019-08-23 | Stop reason: SDUPTHER

## 2019-08-19 RX ORDER — ERGOCALCIFEROL 1.25 MG/1
CAPSULE ORAL
Qty: 12 CAPSULE | Refills: 1 | OUTPATIENT
Start: 2019-08-19

## 2019-08-20 DIAGNOSIS — M85.80 OSTEOPENIA AFTER MENOPAUSE: Primary | ICD-10-CM

## 2019-08-20 DIAGNOSIS — Z78.0 OSTEOPENIA AFTER MENOPAUSE: Primary | ICD-10-CM

## 2019-08-23 ENCOUNTER — OFFICE VISIT (OUTPATIENT)
Dept: FAMILY MEDICINE CLINIC | Facility: CLINIC | Age: 74
End: 2019-08-23

## 2019-08-23 VITALS
TEMPERATURE: 97 F | OXYGEN SATURATION: 96 % | HEART RATE: 57 BPM | HEIGHT: 68 IN | SYSTOLIC BLOOD PRESSURE: 157 MMHG | BODY MASS INDEX: 42.59 KG/M2 | DIASTOLIC BLOOD PRESSURE: 71 MMHG | WEIGHT: 281 LBS

## 2019-08-23 DIAGNOSIS — I10 ESSENTIAL HYPERTENSION: Primary | ICD-10-CM

## 2019-08-23 DIAGNOSIS — D50.9 IRON DEFICIENCY ANEMIA, UNSPECIFIED IRON DEFICIENCY ANEMIA TYPE: ICD-10-CM

## 2019-08-23 DIAGNOSIS — E78.5 HYPERLIPIDEMIA, UNSPECIFIED HYPERLIPIDEMIA TYPE: ICD-10-CM

## 2019-08-23 PROBLEM — D64.9 ANEMIA: Status: ACTIVE | Noted: 2018-07-03

## 2019-08-23 PROCEDURE — 99214 OFFICE O/P EST MOD 30 MIN: CPT | Performed by: FAMILY MEDICINE

## 2019-08-23 RX ORDER — ATENOLOL 100 MG/1
100 TABLET ORAL DAILY
Qty: 90 TABLET | Refills: 3 | Status: SHIPPED | OUTPATIENT
Start: 2019-08-23 | End: 2020-08-03

## 2019-08-23 RX ORDER — AMLODIPINE BESYLATE 10 MG/1
10 TABLET ORAL EVERY 24 HOURS
Qty: 90 TABLET | Refills: 3 | Status: SHIPPED | OUTPATIENT
Start: 2019-08-23 | End: 2020-09-04

## 2019-08-23 RX ORDER — ATORVASTATIN CALCIUM 20 MG/1
20 TABLET, FILM COATED ORAL
Qty: 90 TABLET | Refills: 3 | Status: SHIPPED | OUTPATIENT
Start: 2019-08-23 | End: 2019-12-30 | Stop reason: SDUPTHER

## 2019-08-23 NOTE — ASSESSMENT & PLAN NOTE
Hypertension is unchanged.  Continue current treatment regimen.  Dietary sodium restriction.  Regular aerobic exercise.  Blood pressure will be reassessed in 6 months.

## 2019-08-23 NOTE — PROGRESS NOTES
Korina Mccurdy is a 74 y.o. female.     History of Present Illness      The patient present for six-month follow-up on hypertension and hyperlipidemia. The problem has been unchanged since onset. The problem is uncontrolled. Pertinent negatives include no anxiety, blurred vision, chest pain, palpitations, peripheral edema or shortness of breath. Risk factors for coronary artery disease include dyslipidemia. Current antihypertension treatment includes beta blocker, diuretics and lifestyle changes.     The following portions of the patient's history were reviewed and updated as appropriate: past family history, past medical history, past social history, past surgical history and problem list.    Review of Systems   Constitutional: Negative for activity change and fatigue.   HENT: Negative for sinus pressure, sore throat and trouble swallowing.    Eyes: Negative for blurred vision.   Respiratory: Negative for cough, shortness of breath and wheezing.    Cardiovascular: Negative for chest pain, palpitations and leg swelling.   Gastrointestinal: Negative for abdominal pain, diarrhea and vomiting.   Endocrine: Negative for cold intolerance, polydipsia, polyphagia and polyuria.   Neurological: Negative for dizziness, tremors, weakness and headache.   Psychiatric/Behavioral: Negative for depressed mood. The patient is not nervous/anxious.        Objective   Physical Exam   Constitutional: She is oriented to person, place, and time. She appears well-developed.   HENT:   Head: Normocephalic.   Mouth/Throat: Oropharynx is clear and moist.   Eyes: EOM are normal. Pupils are equal, round, and reactive to light.   Neck: Normal range of motion. Neck supple. No thyromegaly present.   Cardiovascular: Normal rate, regular rhythm and normal heart sounds.   Pulmonary/Chest: Effort normal and breath sounds normal. She has no wheezes.   Abdominal: Soft. Bowel sounds are normal.   Neurological: She is alert and oriented to  person, place, and time.   Psychiatric: She has a normal mood and affect.   Vitals reviewed.        Assessment/Plan   Problems Addressed this Visit        Cardiovascular and Mediastinum    Hyperlipidemia     Lipid abnormalities are unchanged.  Nutritional counseling was provided.  Lipids will be reassessed in 6 months.         Relevant Medications    atorvastatin (LIPITOR) 20 MG tablet    Other Relevant Orders    Lipid panel    Comprehensive metabolic panel    Hypertension - Primary     Hypertension is unchanged.  Continue current treatment regimen.  Dietary sodium restriction.  Regular aerobic exercise.  Blood pressure will be reassessed in 6 months.         Relevant Medications    amLODIPine (NORVASC) 10 MG tablet    atenolol (TENORMIN) 100 MG tablet    Other Relevant Orders    Lipid panel    Comprehensive metabolic panel       Hematopoietic and Hemostatic    Anemia    Relevant Orders    CBC No Differential    Iron level    Ferritin

## 2019-08-23 NOTE — ASSESSMENT & PLAN NOTE
Lipid abnormalities are unchanged.  Nutritional counseling was provided.  Lipids will be reassessed in 6 months.

## 2019-08-26 ENCOUNTER — LAB (OUTPATIENT)
Dept: FAMILY MEDICINE CLINIC | Facility: CLINIC | Age: 74
End: 2019-08-26

## 2019-08-26 ENCOUNTER — APPOINTMENT (OUTPATIENT)
Dept: BONE DENSITY | Facility: HOSPITAL | Age: 74
End: 2019-08-26

## 2019-08-26 DIAGNOSIS — I10 ESSENTIAL HYPERTENSION: ICD-10-CM

## 2019-08-26 DIAGNOSIS — D50.9 IRON DEFICIENCY ANEMIA, UNSPECIFIED IRON DEFICIENCY ANEMIA TYPE: ICD-10-CM

## 2019-08-26 DIAGNOSIS — E78.5 HYPERLIPIDEMIA, UNSPECIFIED HYPERLIPIDEMIA TYPE: ICD-10-CM

## 2019-08-26 LAB
ALBUMIN SERPL-MCNC: 3.4 G/DL (ref 3.5–4.8)
ALBUMIN/GLOB SERPL: 0.9 G/DL (ref 1–1.7)
ALP SERPL-CCNC: 45 U/L (ref 32–91)
ALT SERPL W P-5'-P-CCNC: 24 U/L (ref 14–54)
ANION GAP SERPL CALCULATED.3IONS-SCNC: 14.7 MMOL/L (ref 5–15)
ARTICHOKE IGE QN: 81 MG/DL (ref 0–100)
AST SERPL-CCNC: 20 U/L (ref 15–41)
BILIRUB SERPL-MCNC: 0.5 MG/DL (ref 0.3–1.2)
BUN BLD-MCNC: 30 MG/DL (ref 8–20)
BUN/CREAT SERPL: 18.8 (ref 5.4–26.2)
CALCIUM SPEC-SCNC: 9 MG/DL (ref 8.9–10.3)
CHLORIDE SERPL-SCNC: 105 MMOL/L (ref 101–111)
CHOLEST SERPL-MCNC: 164 MG/DL
CO2 SERPL-SCNC: 24 MMOL/L (ref 22–32)
CREAT BLD-MCNC: 1.6 MG/DL (ref 0.4–1)
DEPRECATED RDW RBC AUTO: 44.6 FL (ref 37–54)
ERYTHROCYTE [DISTWIDTH] IN BLOOD BY AUTOMATED COUNT: 14.1 % (ref 12.3–15.4)
FERRITIN SERPL-MCNC: 128 NG/ML (ref 11–307)
GFR SERPL CREATININE-BSD FRML MDRD: 38 ML/MIN/1.73
GLOBULIN UR ELPH-MCNC: 3.7 GM/DL (ref 2.5–3.8)
GLUCOSE BLD-MCNC: 166 MG/DL (ref 65–99)
HCT VFR BLD AUTO: 34.2 % (ref 34–46.6)
HDLC SERPL QL: 2.98
HDLC SERPL-MCNC: 55 MG/DL
HGB BLD-MCNC: 11.1 G/DL (ref 12–15.9)
IRON 24H UR-MRATE: 75 MCG/DL (ref 28–170)
LDLC/HDLC SERPL: 1.52 {RATIO}
MCH RBC QN AUTO: 29 PG (ref 26.6–33)
MCHC RBC AUTO-ENTMCNC: 32.4 G/DL (ref 31.5–35.7)
MCV RBC AUTO: 89.4 FL (ref 79–97)
PLATELET # BLD AUTO: 252 10*3/MM3 (ref 140–450)
PMV BLD AUTO: 8.7 FL (ref 6–12)
POTASSIUM BLD-SCNC: 4.7 MMOL/L (ref 3.6–5.1)
PROT SERPL-MCNC: 7.1 G/DL (ref 6.1–7.9)
RBC # BLD AUTO: 3.83 10*6/MM3 (ref 3.77–5.28)
SODIUM BLD-SCNC: 139 MMOL/L (ref 136–144)
TRIGL SERPL-MCNC: 126 MG/DL
VLDLC SERPL-MCNC: 25.2 MG/DL
WBC NRBC COR # BLD: 6.3 10*3/MM3 (ref 3.4–10.8)

## 2019-08-26 PROCEDURE — 85027 COMPLETE CBC AUTOMATED: CPT | Performed by: FAMILY MEDICINE

## 2019-08-26 PROCEDURE — 83540 ASSAY OF IRON: CPT | Performed by: FAMILY MEDICINE

## 2019-08-26 PROCEDURE — 80053 COMPREHEN METABOLIC PANEL: CPT | Performed by: FAMILY MEDICINE

## 2019-08-26 PROCEDURE — 80061 LIPID PANEL: CPT | Performed by: FAMILY MEDICINE

## 2019-08-26 PROCEDURE — 82728 ASSAY OF FERRITIN: CPT | Performed by: FAMILY MEDICINE

## 2019-08-26 PROCEDURE — 36415 COLL VENOUS BLD VENIPUNCTURE: CPT

## 2019-08-27 RX ORDER — ERGOCALCIFEROL 1.25 MG/1
CAPSULE ORAL
Qty: 12 CAPSULE | Refills: 1 | OUTPATIENT
Start: 2019-08-27

## 2019-09-26 RX ORDER — ERGOCALCIFEROL 1.25 MG/1
CAPSULE ORAL
Qty: 12 CAPSULE | Refills: 1 | OUTPATIENT
Start: 2019-09-26

## 2019-09-27 NOTE — TELEPHONE ENCOUNTER
Called Pt and talked to her daughter.  She stated that she was not able to make an appt at this time but would call back.  I informed her that we would not be able to refill until appts are made

## 2019-10-22 RX ORDER — DENOSUMAB 60 MG/ML
INJECTION SUBCUTANEOUS
Qty: 1 SYRINGE | Refills: 0 | Status: ON HOLD | OUTPATIENT
Start: 2019-10-22 | End: 2022-08-22

## 2019-10-24 ENCOUNTER — TELEPHONE (OUTPATIENT)
Dept: ORTHOPEDIC SURGERY | Facility: CLINIC | Age: 74
End: 2019-10-24

## 2019-11-01 DIAGNOSIS — E11.65 TYPE 2 DIABETES MELLITUS WITH HYPERGLYCEMIA, UNSPECIFIED WHETHER LONG TERM INSULIN USE (HCC): Primary | ICD-10-CM

## 2019-11-01 DIAGNOSIS — E78.5 HYPERLIPIDEMIA, UNSPECIFIED HYPERLIPIDEMIA TYPE: ICD-10-CM

## 2019-11-01 DIAGNOSIS — I10 ESSENTIAL HYPERTENSION: ICD-10-CM

## 2019-11-01 PROBLEM — M79.606 PAIN OF LOWER EXTREMITY: Status: ACTIVE | Noted: 2017-10-23

## 2019-11-01 PROBLEM — R53.83 FATIGUE: Status: ACTIVE | Noted: 2018-07-03

## 2019-11-01 PROBLEM — M25.50 POLYARTHRALGIA: Status: ACTIVE | Noted: 2017-05-25

## 2019-11-01 PROBLEM — Z79.899 OTHER LONG TERM (CURRENT) DRUG THERAPY: Status: ACTIVE | Noted: 2017-06-26

## 2019-11-01 PROBLEM — R68.89 COLD INTOLERANCE: Status: ACTIVE | Noted: 2018-07-03

## 2019-11-01 PROBLEM — R31.9 HEMATURIA: Status: ACTIVE | Noted: 2017-10-31

## 2019-11-01 PROBLEM — N81.3 UTEROVAGINAL PROLAPSE, COMPLETE: Status: ACTIVE | Noted: 2018-01-25

## 2019-11-01 PROBLEM — N39.0 URINARY TRACT INFECTION: Status: ACTIVE | Noted: 2017-10-23

## 2019-11-01 PROBLEM — J06.9 ACUTE UPPER RESPIRATORY INFECTION: Status: ACTIVE | Noted: 2017-01-16

## 2019-11-01 PROBLEM — E04.1 THYROID NODULE: Status: ACTIVE | Noted: 2019-04-25

## 2019-11-01 PROBLEM — N81.4 UTEROVAGINAL PROLAPSE: Status: ACTIVE | Noted: 2018-01-25

## 2019-11-01 PROBLEM — R80.9 MICROALBUMINURIA: Status: ACTIVE | Noted: 2018-10-26

## 2019-11-01 PROBLEM — M25.561 KNEE PAIN, RIGHT: Status: ACTIVE | Noted: 2017-05-01

## 2019-11-01 PROBLEM — N28.9 RENAL INSUFFICIENCY: Status: ACTIVE | Noted: 2017-12-12

## 2019-11-01 PROBLEM — Z01.818 PREOPERATIVE EXAMINATION: Status: ACTIVE | Noted: 2017-12-19

## 2019-11-05 ENCOUNTER — OFFICE VISIT (OUTPATIENT)
Dept: FAMILY MEDICINE CLINIC | Facility: CLINIC | Age: 74
End: 2019-11-05

## 2019-11-05 VITALS
HEIGHT: 68 IN | DIASTOLIC BLOOD PRESSURE: 64 MMHG | SYSTOLIC BLOOD PRESSURE: 143 MMHG | TEMPERATURE: 97.2 F | OXYGEN SATURATION: 96 % | WEIGHT: 277 LBS | HEART RATE: 60 BPM | BODY MASS INDEX: 41.98 KG/M2

## 2019-11-05 DIAGNOSIS — E78.5 HYPERLIPIDEMIA, UNSPECIFIED HYPERLIPIDEMIA TYPE: ICD-10-CM

## 2019-11-05 DIAGNOSIS — I10 ESSENTIAL HYPERTENSION: Primary | ICD-10-CM

## 2019-11-05 DIAGNOSIS — E55.9 VITAMIN D DEFICIENCY: ICD-10-CM

## 2019-11-05 PROCEDURE — 99213 OFFICE O/P EST LOW 20 MIN: CPT | Performed by: FAMILY MEDICINE

## 2019-11-05 RX ORDER — ERGOCALCIFEROL 1.25 MG/1
50000 CAPSULE ORAL WEEKLY
Qty: 5 CAPSULE | Refills: 1 | Status: SHIPPED | OUTPATIENT
Start: 2019-11-05 | End: 2019-12-30

## 2019-11-05 NOTE — ASSESSMENT & PLAN NOTE
Hypertension is improving with treatment.  Continue current treatment regimen.  Dietary sodium restriction.  Weight loss.  Regular aerobic exercise.  Blood pressure will be reassessed in 6 months.

## 2019-11-05 NOTE — PROGRESS NOTES
Korina Mccurdy is a 74 y.o. female.     History of Present Illness   HTN   The patient present for six-month follow-up on hypertension and hyperlipidemia. The problem has been gradually improving since onset. The problem is controlled. Pertinent negatives include no anxiety, blurred vision, chest pain, palpitations, peripheral edema or shortness of breath. Risk factors for coronary artery disease include dyslipidemia. Current antihypertension treatment includes CCB, beta blockers and lifestyle changes.     The following portions of the patient's history were reviewed and updated as appropriate: past medical history, past social history, past surgical history and problem list.    Review of Systems   Constitutional: Negative for fatigue and fever.   HENT: Negative for trouble swallowing.    Respiratory: Negative for shortness of breath.    Cardiovascular: Negative for chest pain and palpitations.   Gastrointestinal: Negative for abdominal pain and GERD.   Neurological: Negative for dizziness and headache.   Psychiatric/Behavioral: Negative for sleep disturbance and depressed mood. The patient is not nervous/anxious.        Objective   Physical Exam   Constitutional: She is oriented to person, place, and time. She appears well-developed.   Neck: Normal range of motion. Neck supple.   Cardiovascular: Normal rate, regular rhythm and normal heart sounds.   Pulmonary/Chest: Effort normal and breath sounds normal. She has no wheezes.   Abdominal: Soft. Bowel sounds are normal.   Neurological: She is alert and oriented to person, place, and time.   Psychiatric: She has a normal mood and affect.   Vitals reviewed.        Assessment/Plan   Problems Addressed this Visit        Cardiovascular and Mediastinum    Hyperlipidemia     Lipid abnormalities are improving with treatment.  Nutritional counseling was provided.  Lipids will be reassessed in 6 months.         Hypertension - Primary     Hypertension is  improving with treatment.  Continue current treatment regimen.  Dietary sodium restriction.  Weight loss.  Regular aerobic exercise.  Blood pressure will be reassessed in 6 months.           Other Visit Diagnoses     Vitamin D deficiency        Relevant Orders    Vitamin D 25 hydroxy

## 2019-11-08 ENCOUNTER — LAB (OUTPATIENT)
Dept: LAB | Facility: HOSPITAL | Age: 74
End: 2019-11-08

## 2019-11-08 DIAGNOSIS — I10 ESSENTIAL HYPERTENSION: ICD-10-CM

## 2019-11-08 DIAGNOSIS — E55.9 VITAMIN D DEFICIENCY: ICD-10-CM

## 2019-11-08 DIAGNOSIS — E78.5 HYPERLIPIDEMIA, UNSPECIFIED HYPERLIPIDEMIA TYPE: ICD-10-CM

## 2019-11-08 DIAGNOSIS — E11.65 TYPE 2 DIABETES MELLITUS WITH HYPERGLYCEMIA, UNSPECIFIED WHETHER LONG TERM INSULIN USE (HCC): ICD-10-CM

## 2019-11-08 LAB
25(OH)D3 SERPL-MCNC: 31.1 NG/ML
ALBUMIN SERPL-MCNC: 3.8 G/DL (ref 3.5–5.2)
ALBUMIN UR-MCNC: 5.4 MG/DL
ALBUMIN/GLOB SERPL: 1 G/DL
ALP SERPL-CCNC: 59 U/L (ref 39–117)
ALT SERPL W P-5'-P-CCNC: 13 U/L (ref 1–33)
ANION GAP SERPL CALCULATED.3IONS-SCNC: 9.5 MMOL/L (ref 5–15)
AST SERPL-CCNC: 17 U/L (ref 1–32)
BILIRUB SERPL-MCNC: 0.3 MG/DL (ref 0.2–1.2)
BUN BLD-MCNC: 47 MG/DL (ref 8–23)
BUN/CREAT SERPL: 26.4 (ref 7–25)
CALCIUM SPEC-SCNC: 10.2 MG/DL (ref 8.6–10.5)
CHLORIDE SERPL-SCNC: 103 MMOL/L (ref 98–107)
CHOLEST SERPL-MCNC: 143 MG/DL (ref 0–200)
CO2 SERPL-SCNC: 27.5 MMOL/L (ref 22–29)
CREAT BLD-MCNC: 1.78 MG/DL (ref 0.57–1)
CREAT UR-MCNC: 76 MG/DL
GFR SERPL CREATININE-BSD FRML MDRD: 34 ML/MIN/1.73
GLOBULIN UR ELPH-MCNC: 3.7 GM/DL
GLUCOSE BLD-MCNC: 152 MG/DL (ref 65–99)
HBA1C MFR BLD: 6.7 % (ref 3.5–5.6)
HDLC SERPL-MCNC: 60 MG/DL (ref 40–60)
LDLC SERPL CALC-MCNC: 66 MG/DL (ref 0–100)
LDLC/HDLC SERPL: 1.11 {RATIO}
MICROALBUMIN/CREAT UR: 71.1 MG/G
POTASSIUM BLD-SCNC: 4.3 MMOL/L (ref 3.5–5.2)
PROT SERPL-MCNC: 7.5 G/DL (ref 6–8.5)
SODIUM BLD-SCNC: 140 MMOL/L (ref 136–145)
TRIGL SERPL-MCNC: 83 MG/DL (ref 0–150)
VLDLC SERPL-MCNC: 16.6 MG/DL (ref 5–40)

## 2019-11-08 PROCEDURE — 82570 ASSAY OF URINE CREATININE: CPT

## 2019-11-08 PROCEDURE — 80061 LIPID PANEL: CPT

## 2019-11-08 PROCEDURE — 83036 HEMOGLOBIN GLYCOSYLATED A1C: CPT

## 2019-11-08 PROCEDURE — 80053 COMPREHEN METABOLIC PANEL: CPT

## 2019-11-08 PROCEDURE — 82043 UR ALBUMIN QUANTITATIVE: CPT

## 2019-11-08 PROCEDURE — 36415 COLL VENOUS BLD VENIPUNCTURE: CPT

## 2019-11-08 PROCEDURE — 82306 VITAMIN D 25 HYDROXY: CPT

## 2019-11-14 ENCOUNTER — LAB (OUTPATIENT)
Dept: LAB | Facility: HOSPITAL | Age: 74
End: 2019-11-14

## 2019-11-14 ENCOUNTER — TELEPHONE (OUTPATIENT)
Dept: ORTHOPEDIC SURGERY | Facility: CLINIC | Age: 74
End: 2019-11-14

## 2019-11-14 ENCOUNTER — TRANSCRIBE ORDERS (OUTPATIENT)
Dept: ADMINISTRATIVE | Facility: HOSPITAL | Age: 74
End: 2019-11-14

## 2019-11-14 DIAGNOSIS — N18.30 CHRONIC KIDNEY DISEASE, STAGE III (MODERATE) (HCC): ICD-10-CM

## 2019-11-14 DIAGNOSIS — N18.30 CHRONIC KIDNEY DISEASE, STAGE III (MODERATE) (HCC): Primary | ICD-10-CM

## 2019-11-14 LAB
ANION GAP SERPL CALCULATED.3IONS-SCNC: 10.8 MMOL/L (ref 5–15)
BASOPHILS # BLD AUTO: 0.02 10*3/MM3 (ref 0–0.2)
BASOPHILS NFR BLD AUTO: 0.3 % (ref 0–1.5)
BILIRUB UR QL STRIP: NEGATIVE
BUN BLD-MCNC: 45 MG/DL (ref 8–23)
BUN/CREAT SERPL: 20.7 (ref 7–25)
CALCIUM SPEC-SCNC: 10.1 MG/DL (ref 8.6–10.5)
CHLORIDE SERPL-SCNC: 102 MMOL/L (ref 98–107)
CLARITY UR: CLEAR
CO2 SERPL-SCNC: 28.2 MMOL/L (ref 22–29)
COLOR UR: YELLOW
CREAT BLD-MCNC: 2.17 MG/DL (ref 0.57–1)
CREAT UR-MCNC: 61.9 MG/DL
DEPRECATED RDW RBC AUTO: 40.4 FL (ref 37–54)
EOSINOPHIL # BLD AUTO: 0.27 10*3/MM3 (ref 0–0.4)
EOSINOPHIL NFR BLD AUTO: 4.1 % (ref 0.3–6.2)
ERYTHROCYTE [DISTWIDTH] IN BLOOD BY AUTOMATED COUNT: 12.5 % (ref 12.3–15.4)
GFR SERPL CREATININE-BSD FRML MDRD: 27 ML/MIN/1.73
GLUCOSE BLD-MCNC: 134 MG/DL (ref 65–99)
GLUCOSE UR STRIP-MCNC: NEGATIVE MG/DL
HCT VFR BLD AUTO: 31.7 % (ref 34–46.6)
HGB BLD-MCNC: 10.5 G/DL (ref 12–15.9)
HGB UR QL STRIP.AUTO: NEGATIVE
IMM GRANULOCYTES # BLD AUTO: 0.02 10*3/MM3 (ref 0–0.05)
IMM GRANULOCYTES NFR BLD AUTO: 0.3 % (ref 0–0.5)
KETONES UR QL STRIP: NEGATIVE
LEUKOCYTE ESTERASE UR QL STRIP.AUTO: NEGATIVE
LYMPHOCYTES # BLD AUTO: 1.58 10*3/MM3 (ref 0.7–3.1)
LYMPHOCYTES NFR BLD AUTO: 24.3 % (ref 19.6–45.3)
MCH RBC QN AUTO: 29.4 PG (ref 26.6–33)
MCHC RBC AUTO-ENTMCNC: 33.1 G/DL (ref 31.5–35.7)
MCV RBC AUTO: 88.8 FL (ref 79–97)
MONOCYTES # BLD AUTO: 0.41 10*3/MM3 (ref 0.1–0.9)
MONOCYTES NFR BLD AUTO: 6.3 % (ref 5–12)
NEUTROPHILS # BLD AUTO: 4.21 10*3/MM3 (ref 1.7–7)
NEUTROPHILS NFR BLD AUTO: 64.7 % (ref 42.7–76)
NITRITE UR QL STRIP: NEGATIVE
NRBC BLD AUTO-RTO: 0 /100 WBC (ref 0–0.2)
PH UR STRIP.AUTO: 6 [PH] (ref 5–8)
PHOSPHATE SERPL-MCNC: 3.5 MG/DL (ref 2.5–4.5)
PLATELET # BLD AUTO: 256 10*3/MM3 (ref 140–450)
PMV BLD AUTO: 10.5 FL (ref 6–12)
POTASSIUM BLD-SCNC: 4.2 MMOL/L (ref 3.5–5.2)
PROT UR QL STRIP: NEGATIVE
PROT UR-MCNC: 10 MG/DL
PTH-INTACT SERPL-MCNC: 55 PG/ML (ref 15–65)
RBC # BLD AUTO: 3.57 10*6/MM3 (ref 3.77–5.28)
SODIUM BLD-SCNC: 141 MMOL/L (ref 136–145)
SP GR UR STRIP: 1.01 (ref 1–1.03)
UROBILINOGEN UR QL STRIP: NORMAL
WBC NRBC COR # BLD: 6.51 10*3/MM3 (ref 3.4–10.8)

## 2019-11-14 PROCEDURE — 84100 ASSAY OF PHOSPHORUS: CPT

## 2019-11-14 PROCEDURE — 85025 COMPLETE CBC W/AUTO DIFF WBC: CPT

## 2019-11-14 PROCEDURE — 36415 COLL VENOUS BLD VENIPUNCTURE: CPT

## 2019-11-14 PROCEDURE — 84156 ASSAY OF PROTEIN URINE: CPT

## 2019-11-14 PROCEDURE — 83970 ASSAY OF PARATHORMONE: CPT

## 2019-11-14 PROCEDURE — 80048 BASIC METABOLIC PNL TOTAL CA: CPT

## 2019-11-14 PROCEDURE — 82570 ASSAY OF URINE CREATININE: CPT

## 2019-11-14 PROCEDURE — 81003 URINALYSIS AUTO W/O SCOPE: CPT

## 2019-11-14 NOTE — TELEPHONE ENCOUNTER
Scheduled patient for Prolia injection. Scheduled to come in tomorrow. Calcium is 10.2 on 11/8/19.

## 2019-11-15 ENCOUNTER — OFFICE VISIT (OUTPATIENT)
Dept: ENDOCRINOLOGY | Facility: CLINIC | Age: 74
End: 2019-11-15

## 2019-11-15 VITALS
HEART RATE: 60 BPM | DIASTOLIC BLOOD PRESSURE: 80 MMHG | SYSTOLIC BLOOD PRESSURE: 160 MMHG | OXYGEN SATURATION: 99 % | BODY MASS INDEX: 43.47 KG/M2 | HEIGHT: 67 IN | WEIGHT: 277 LBS

## 2019-11-15 DIAGNOSIS — E78.2 MIXED HYPERLIPIDEMIA: ICD-10-CM

## 2019-11-15 DIAGNOSIS — I10 ESSENTIAL HYPERTENSION: ICD-10-CM

## 2019-11-15 DIAGNOSIS — N18.30 CHRONIC RENAL INSUFFICIENCY, STAGE III (MODERATE) (HCC): ICD-10-CM

## 2019-11-15 DIAGNOSIS — E11.65 TYPE 2 DIABETES MELLITUS WITH HYPERGLYCEMIA, WITHOUT LONG-TERM CURRENT USE OF INSULIN (HCC): Primary | ICD-10-CM

## 2019-11-15 DIAGNOSIS — E04.1 THYROID NODULE: ICD-10-CM

## 2019-11-15 PROBLEM — N17.9 ACUTE KIDNEY FAILURE (HCC): Status: ACTIVE | Noted: 2018-11-15

## 2019-11-15 PROBLEM — E11.22 TYPE 2 DIABETES MELLITUS WITH DIABETIC CHRONIC KIDNEY DISEASE (HCC): Status: ACTIVE | Noted: 2018-11-15

## 2019-11-15 PROBLEM — R80.9 PROTEINURIA: Status: ACTIVE | Noted: 2018-11-15

## 2019-11-15 PROBLEM — R60.9 EDEMA: Status: ACTIVE | Noted: 2018-11-15

## 2019-11-15 PROBLEM — E78.5 HYPERLIPIDEMIA: Status: ACTIVE | Noted: 2018-11-15

## 2019-11-15 PROCEDURE — 99214 OFFICE O/P EST MOD 30 MIN: CPT | Performed by: INTERNAL MEDICINE

## 2019-11-15 NOTE — PROGRESS NOTES
Endocrine Progress Note Outpatient     Patient Care Team:  Rashawn Garibay MD as PCP - General  Stiven Garibay MD as PCP - Claims Attributed    Chief Complaint: Follow-up type 2 diabetes    HPI: 74-year-old female with history of type 2 diabetes, hypertension, hyperlipidemia and thyroid nodule is here for follow-up.  For type 2 diabetes she is currently on Januvia 25 mg once a day.  Usually runs below 100.  Time to follow her diet the best she can.  Does not get flu shot.  She has done eye exam in the last 1 year.  Hypertension: Blood pressure is high today.  Hyperlipidemia: Excellent control she is currently on atorvastatin 20 mill grams p.o. daily  CKD stage III disease: She follows with her kidney doctor.  Thyroid nodule: She has a 10 mm node in the right side and a 12 mm on the left side.  There is no family history of thyroid cancer or radiation exposure.  She has not done biopsies of any of these nodules.    Past Medical History:   Diagnosis Date   • Diabetes mellitus (CMS/HCC)     type 2    • Glaucoma    • History of prolapse of bladder 2017    and vaginal    • Hyperlipidemia    • Hypertension    • Knee pain, right    • Legally blind    • Low back pain    • Osteopenia 08/2017    DEXA- on prolia       Social History     Socioeconomic History   • Marital status:      Spouse name: Not on file   • Number of children: Not on file   • Years of education: Not on file   • Highest education level: Not on file   Tobacco Use   • Smoking status: Never Smoker   • Smokeless tobacco: Never Used   Substance and Sexual Activity   • Alcohol use: No     Frequency: Never   • Drug use: No   • Sexual activity: Defer       Family History   Problem Relation Age of Onset   • Cancer Mother        No Known Allergies    ROS:   Constitutional:  Denies fatigue, tiredness.    Eyes:  Denies change in visual acuity   HENT:  Denies nasal congestion or sore throat   Respiratory: denies cough, shortness of breath.   Cardiovascular:   denies chest pain, edema   GI:  Denies abdominal pain, nausea, vomiting.   Musculoskeletal:  Denies back pain or joint pain   Integument:  Denies dry skin and rash   Neurologic:  Denies headache, focal weakness or sensory changes   Endocrine:  Denies polyuria or polydipsia   Psychiatric:  Denies depression or anxiety      Vitals:    11/15/19 0858   BP: 160/80   Pulse: 60   SpO2: 99%       Physical Exam:  GEN: NAD, conversant, obese  EYES: EOMI, PERRL, no conjunctival erythema  NECK: no thyromegaly, full ROM   CV: RRR, no murmurs/rubs/gallops, no peripheral edema  LUNG: CTAB, no wheezes/rales/ronchi  SKIN: no rashes, no acanthosis  MSK: no deformities, full ROM of all extremities  NEURO: no tremors, DTR normal  PSYCH: AOX3, appropriate mood, affect normal      Results Review:     I reviewed the patient's new clinical results.    Lab Results   Component Value Date    HGBA1C 6.7 (H) 11/08/2019    HGBA1C 5.7 (H) 10/18/2018    HGBA1C 5.7 01/26/2018      Lab Results   Component Value Date    GLUCOSE 134 (H) 11/14/2019    BUN 45 (H) 11/14/2019    CREATININE 2.17 (H) 11/14/2019    EGFRIFAFRI 27 (L) 11/14/2019    BCR 20.7 11/14/2019    K 4.2 11/14/2019    CO2 28.2 11/14/2019    CALCIUM 10.1 11/14/2019    ALBUMIN 3.80 11/08/2019    LABIL2 1.0 05/17/2019    AST 17 11/08/2019    ALT 13 11/08/2019    CHOL 143 11/08/2019    TRIG 83 11/08/2019    LDL 66 11/08/2019    HDL 60 11/08/2019         Medication Review: Reviewed.       Current Outpatient Medications:   •  acetaminophen (TYLENOL) 325 MG tablet, Take 2 tablets by mouth Daily As Needed., Disp: , Rfl:   •  amLODIPine (NORVASC) 10 MG tablet, Take 1 tablet by mouth Daily., Disp: 90 tablet, Rfl: 3  •  ASPIRIN LOW DOSE 81 MG EC tablet, TAKE ONE TABLET BY MOUTH EVERY DAY, Disp: 30 tablet, Rfl: 4  •  atenolol (TENORMIN) 100 MG tablet, Take 1 tablet by mouth Daily., Disp: 90 tablet, Rfl: 3  •  atorvastatin (LIPITOR) 20 MG tablet, Take 1 tablet by mouth every night at bedtime., Disp:  90 tablet, Rfl: 3  •  RAMU MICROLET LANCETS lancets, RAMU MICROLET LANCETS, Disp: , Rfl:   •  Ferrous Fumarate 325 (106 Fe) MG tablet, Take  by mouth., Disp: , Rfl:   •  furosemide (LASIX) 40 MG tablet, Take 40 mg by mouth Daily., Disp: , Rfl: 3  •  glucose blood (CONTOUR NEXT TEST) test strip, CONTOUR NEXT TEST STRP, Disp: , Rfl:   •  JANUVIA 25 MG tablet, Take 1 tablet by mouth Daily., Disp: 90 tablet, Rfl: 1  •  latanoprost (XALATAN) 0.005 % ophthalmic solution, INSTILL 1 DROP INTO THE AFFECTED EYE(S) IN THE EVENING, Disp: , Rfl: 2  •  PROLIA 60 MG/ML solution prefilled syringe syringe, MD to administer 60 mg subcutaneously every 6 months, Disp: 1 syringe, Rfl: 0  •  vitamin D (ERGOCALCIFEROL) 1.25 MG (53018 UT) capsule capsule, Take 1 capsule by mouth 1 (One) Time Per Week., Disp: 5 capsule, Rfl: 1      Assessment/Plan   1.  Diabetes type 2: Excellent control with A1c 6.7%.  We will continue Januvia 25 mg p.o. daily.  She is advised to continue to work on diet and activity and will follow blood sugars and A1c.  2.  Hypertension: Repeat blood pressure today was 130/60, will continue current blood pressure medications.  3.  Hyperlipidemia: Excellent control with LDL 66 and triglycerides 83.  Continue atorvastatin.  4.  CKD stage III disease: She follows with a kidney doctor  5.  Thyroid nodules: She has a 10 mm nodule in the right side and a 12 mm on the left side.  Will check thyroid ultrasound.            Stiven Garibay MD FACE.    Much of the above report is an electronic transcription/translation of the spoken language to printed text using Dragon Software. As such, the subtleties and finesse of the spoken language may permit erroneous, or at times, nonsensical words or phrases to be inadvertently transcribed; thus changes may be made at a later date to rectify these errors.

## 2019-11-15 NOTE — PATIENT INSTRUCTIONS
Continue current medications  Please arrange for thyroid ultrasound in next few days  Follow-up in 6 months with labs  Always keep glucose source with you in case of low blood sugar  Always get annual eye exam.

## 2019-11-21 ENCOUNTER — OFFICE VISIT (OUTPATIENT)
Dept: ORTHOPEDIC SURGERY | Facility: CLINIC | Age: 74
End: 2019-11-21

## 2019-11-21 VITALS
DIASTOLIC BLOOD PRESSURE: 72 MMHG | BODY MASS INDEX: 43.79 KG/M2 | HEIGHT: 67 IN | WEIGHT: 279 LBS | HEART RATE: 56 BPM | SYSTOLIC BLOOD PRESSURE: 147 MMHG

## 2019-11-21 DIAGNOSIS — E11.65 TYPE 2 DIABETES MELLITUS WITH HYPERGLYCEMIA, WITHOUT LONG-TERM CURRENT USE OF INSULIN (HCC): ICD-10-CM

## 2019-11-21 DIAGNOSIS — N18.30 CHRONIC KIDNEY DISEASE, STAGE 3 (MODERATE): ICD-10-CM

## 2019-11-21 DIAGNOSIS — M81.0 POST-MENOPAUSAL OSTEOPOROSIS: ICD-10-CM

## 2019-11-21 DIAGNOSIS — E66.01 MORBIDLY OBESE (HCC): ICD-10-CM

## 2019-11-21 DIAGNOSIS — Z78.0 OSTEOPENIA AFTER MENOPAUSE: Primary | ICD-10-CM

## 2019-11-21 DIAGNOSIS — M85.80 OSTEOPENIA AFTER MENOPAUSE: Primary | ICD-10-CM

## 2019-11-21 DIAGNOSIS — Z78.0 ENCOUNTER FOR OSTEOPOROSIS SCREENING IN ASYMPTOMATIC POSTMENOPAUSAL PATIENT: ICD-10-CM

## 2019-11-21 DIAGNOSIS — Z13.820 ENCOUNTER FOR OSTEOPOROSIS SCREENING IN ASYMPTOMATIC POSTMENOPAUSAL PATIENT: ICD-10-CM

## 2019-11-21 DIAGNOSIS — Z51.81 MEDICATION MONITORING ENCOUNTER: ICD-10-CM

## 2019-11-21 PROCEDURE — 99214 OFFICE O/P EST MOD 30 MIN: CPT | Performed by: PHYSICIAN ASSISTANT

## 2019-11-21 PROCEDURE — 96372 THER/PROPH/DIAG INJ SC/IM: CPT | Performed by: PHYSICIAN ASSISTANT

## 2019-11-21 NOTE — PATIENT INSTRUCTIONS
Osteoporosis    Osteoporosis happens when your bones get thin and weak. This can cause your bones to break (fracture) more easily. You can do things at home to make your bones stronger.  Follow these instructions at home:    Activity  · Exercise as told by your doctor. Ask your doctor what activities are safe for you. You should do:  ? Exercises that make your muscles work to hold your body weight up (weight-bearing exercises). These include nery chi, yoga, and walking.  ? Exercises to make your muscles stronger. One example is lifting weights.  Lifestyle  · Limit alcohol intake to no more than 1 drink a day for nonpregnant women and 2 drinks a day for men. One drink equals 12 oz of beer, 5 oz of wine, or 1½ oz of hard liquor.  · Do not use any products that have nicotine or tobacco in them. These include cigarettes and e-cigarettes. If you need help quitting, ask your doctor.  Preventing falls  · Use tools to help you move around (mobility aids) as needed. These include canes, walkers, scooters, and crutches.  · Keep rooms well-lit and free of clutter.  · Put away things that could make you trip. These include cords and rugs.  · Install safety rails on stairs. Install grab bars in bathrooms.  · Use rubber mats in slippery areas, like bathrooms.  · Wear shoes that:  ? Fit you well.  ? Support your feet.  ? Have closed toes.  ? Have rubber soles or low heels.  · Tell your doctor about all of the medicines you are taking. Some medicines can make you more likely to fall.  General instructions  · Eat plenty of calcium and vitamin D. These nutrients are good for your bones. Good sources of calcium and vitamin D include:  ? Some fatty fish, such as salmon and tuna.  ? Foods that have calcium and vitamin D added to them (fortified foods). For example, some breakfast cereals are fortified with calcium and vitamin D.  ? Egg yolks.  ? Cheese.  ? Liver.  · Take over-the-counter and prescription medicines only as told by your  doctor.  · Keep all follow-up visits as told by your doctor. This is important.  Contact a doctor if:  · You have not been tested (screened) for osteoporosis and you are:  ? A woman who is age 65 or older.  ? A man who is age 70 or older.  Get help right away if:  · You fall.  · You get hurt.  Summary  · Osteoporosis happens when your bones get thin and weak.  · Weak bones can break (fracture) more easily.  · Eat plenty of calcium and vitamin D. These nutrients are good for your bones.  · Tell your doctor about all of the medicines that you take.  This information is not intended to replace advice given to you by your health care provider. Make sure you discuss any questions you have with your health care provider.  Document Released: 03/11/2013 Document Revised: 10/12/2018 Document Reviewed: 10/12/2018  [employer] Interactive Patient Education © 2019 [employer]      Preventing Health Risks of Being Overweight  Maintaining a healthy body weight is an important part of your overall health. Your healthy body weight depends on your age, gender, and height. Being overweight puts you at risk for many health problems, including:  · Heart disease.  · Diabetes.  · Problems sleeping.  · Joint problems.  You can make changes to your diet and lifestyle to prevent these risks. Consider working with a health care provider or a dietitian to make these changes.  What nutrition changes can be made?    · Eat only as much as your body needs. In most cases, this is about 2,000 calories a day, but the amount varies depending on your height, gender, and activity level. Ask your health care provider how many calories you should have each day. Eating more than your body needs on a regular basis can cause you to become overweight or obese.  · Eat slowly, and stop eating when you feel full.  · Choose healthy foods, including:  ? Fruits and vegetables.  ? Lean meats.  ? Low-fat dairy products.  ? High-fiber foods, such as whole grains and  beans.  ? Healthy snacks like vegetable sticks, a piece of fruit, or a small amount of yogurt or cheese.  · Avoid foods and drinks that are high in sugar, salt (sodium), saturated fat, or trans fat. This includes:  ? Many desserts such as candy, cookies, and ice cream.  ? Soda.  ? Fried foods.  ? Processed meats such as hot dogs or lunch meats.  ? Prepackaged snack foods.  What lifestyle changes can be made?    · Exercise for at least 150 minutes a week to prevent weight gain, or as often as recommended by your health care provider. Do moderate-intensity exercise, such as brisk walking.  ? Spread it out by exercising for 30 minutes 5 days a week, or in short 10-minute bursts several times a day.  · Find other ways to stay active and burn calories, such as yard work or a hobby that involves physical activity.  · Get at least 8 hours of sleep each night. When you are well-rested, you are more likely to be active and make healthy choices during the day. To sleep better:  ? Try to go to bed and wake up at about the same time every day.  ? Keep your bedroom dark, quiet, and cool.  ? Make sure that your bed is comfortable.  ? Avoid stimulating activities, such as watching television or exercising, for at least one hour before bedtime.  Why are these changes important?  Eating healthy and being active helps you lose weight and prevent health problems caused by being overweight. Making these changes can also help you manage stress, feel better mentally, and connect with friends and family.  What can happen if changes are not made?  Being overweight can affect you for your entire life. You may develop joint or bone problems that make it painful or difficult for you to play sports or do activities you enjoy. Being overweight puts stress on your heart and lungs and can lead to medical problems like diabetes, heart disease, and sleeping problems.  Where to find support  You can get support for preventing health risks of being  overweight from:  · Your health care provider or a dietitian. They can provide guidance about healthy eating and healthy lifestyle choices.  · Weight loss support groups, online or in-person.  Where to find more information  · MyPlate: www.choosemyplate.gov  ? This an online tool that provides personalized recommendations about foods to eat each day.  · The Centers for Disease Control and Prevention: www.cdc.gov/healthyweight  ? This resource gives tips for managing weight and having an active lifestyle.  Summary  · To prevent unhealthy weight gain, it is important to maintain a healthy diet high in vegetables and whole grains, exercise regularly, and get at least 8 hours of sleep each night.  · Making these changes helps prevent many long-term (chronic) health conditions that can shorten your life, such as diabetes, heart disease, and stroke.  This information is not intended to replace advice given to you by your health care provider. Make sure you discuss any questions you have with your health care provider.  Document Released: 11/14/2018 Document Revised: 11/14/2018 Document Reviewed: 11/14/2018  [employer] Interactive Patient Education © 2019 [employer]

## 2019-11-21 NOTE — PROGRESS NOTES
ORTHO FOLLOW UP       Subjective:    HPI:   Abiola Mccurdy is a 74 y.o. female who presents in follow-up for osteopenia.  She also has stage III chronic kidney disease and diabetes.  She is currently on Prolia and reports that she is doing well with no noticeable side effects.  She does not take calcium but does take vitamin D.  She denies any fractures since last office visit.  Recent labs were reviewed.  She is overdue for DEXA scan, which has been ordered, but she reports she has not been contacted to set that up.    Past Medical History:   Diagnosis Date   • Chronic low back pain 5/1/2017   • Chronic renal insufficiency, stage III (moderate) (CMS/[facility]) 10/26/2018   • Degeneration of intervertebral disc of lumbar region 5/25/2017   • Diabetes mellitus (CMS/[facility])     type 2    • Diabetic foot ulcer (CMS/[facility]) 9/21/2017   • Diabetic peripheral neuropathy (CMS/[facility]) 10/25/2016   • Dyspeptic diarrhea 9/29/2016   • Essential hypertension 7/14/2016   • Glaucoma    • History of prolapse of bladder 2017    and vaginal    • Hyperlipidemia    • Hypertension    • Knee pain, right    • Legally blind    • Low back pain    • Morbidly obese (CMS/[facility]) 11/21/2019   • Osteopenia 08/2017    DEXA- on prolia   • Osteopenia after menopause 8/29/2017   • Peripheral venous insufficiency 10/25/2016   • Polyarthralgia 5/25/2017   • Thyroid nodule 4/25/2019   • Type 2 diabetes mellitus with diabetic chronic kidney disease (CMS/[facility]) 11/15/2018   • Type 2 diabetes mellitus with hyperglycemia, without long-term current use of insulin (CMS/[facility]) 7/14/2016       Past Surgical History:   Procedure Laterality Date   • CATARACT EXTRACTION     • HYSTERECTOMY  01/2018       Social History     Occupational History   • Not on file   Tobacco Use   • Smoking status: Never Smoker   • Smokeless tobacco: Never Used   Substance and Sexual Activity   • Alcohol use: No     Frequency: Never   • Drug use: No   • Sexual activity: Defer         Medications:    Current Outpatient Medications:   •  acetaminophen (TYLENOL) 325 MG tablet, Take 2 tablets by mouth Daily As Needed., Disp: , Rfl:   •  amLODIPine (NORVASC) 10 MG tablet, Take 1 tablet by mouth Daily., Disp: 90 tablet, Rfl: 3  •  ASPIRIN LOW DOSE 81 MG EC tablet, TAKE ONE TABLET BY MOUTH EVERY DAY, Disp: 30 tablet, Rfl: 4  •  atenolol (TENORMIN) 100 MG tablet, Take 1 tablet by mouth Daily., Disp: 90 tablet, Rfl: 3  •  atorvastatin (LIPITOR) 20 MG tablet, Take 1 tablet by mouth every night at bedtime., Disp: 90 tablet, Rfl: 3  •  RAMU MICROLET LANCETS lancets, RAMU MICROLET LANCETS, Disp: , Rfl:   •  Ferrous Fumarate 325 (106 Fe) MG tablet, Take  by mouth., Disp: , Rfl:   •  furosemide (LASIX) 40 MG tablet, Take 40 mg by mouth Daily., Disp: , Rfl: 3  •  glucose blood (CONTOUR NEXT TEST) test strip, CONTOUR NEXT TEST STRP, Disp: , Rfl:   •  JANUVIA 25 MG tablet, Take 1 tablet by mouth Daily., Disp: 90 tablet, Rfl: 1  •  latanoprost (XALATAN) 0.005 % ophthalmic solution, INSTILL 1 DROP INTO THE AFFECTED EYE(S) IN THE EVENING, Disp: , Rfl: 2  •  PROLIA 60 MG/ML solution prefilled syringe syringe, MD to administer 60 mg subcutaneously every 6 months, Disp: 1 syringe, Rfl: 0  •  vitamin D (ERGOCALCIFEROL) 1.25 MG (72800 UT) capsule capsule, Take 1 capsule by mouth 1 (One) Time Per Week., Disp: 5 capsule, Rfl: 1    Current Facility-Administered Medications:   •  denosumab (PROLIA) syringe 60 mg, 60 mg, Subcutaneous, Q6 Months, Mei Anthony PA, 60 mg at 11/21/19 1107    Allergies:  No Known Allergies    Review of Systems:  Gen -no fever, chills , sweats, headache   Eyes - no irritation or discharge   ENT -  no ear pain , runny nose , sore throat , difficulty swallowing   Resp - no cough , congestion , excessive expectoration   CVS - no chest pain , palpitations.   Abd - no pain , nausea , vomiting , diarrhea   Skin - no rash , lesions.   Neuro - no dizziness    Please see HPI for any other  "pertinent positives.  All other systems were reviewed and are negative.       Objective   Objective:    /72 (BP Location: Left arm, Patient Position: Sitting, Cuff Size: Large Adult)   Pulse 56   Ht 170.2 cm (67\")   Wt 127 kg (279 lb)   BMI 43.70 kg/m²     Physical Examination:  Morbidly obese individual in no acute distress, patient is alert and cooperative with the exam, appears to have normal mood and affect with a normal attention span and concentration, ambulating unassisted  normocephalic, atraumatic, extraocular movements intact, conjunctiva and sclera are clear without nystagmus, normal canals with grossly normal hearing, no nasal deformity, discharge, inflammation, or lesions, no mouth deformity or lesions  no lymphadenopathy or thyromegaly  normal respiratory effort  abdomen is nontender, without guarding or rebound and nondistended  no gross joint abnormalities  pulses normal in all 4 extremities, no clubbing, cyanosis, or edema noted  cranial nerves II-XII grossly intact with no focal defects  skin intact without lesions or rashes visible             Imaging:  no diagnostic testing performed this visit            Assessment:  1. Osteopenia after menopause    2. Chronic kidney disease, stage 3 (moderate) (CMS/HCC)    3. Medication monitoring encounter    4. Type 2 diabetes mellitus with hyperglycemia, without long-term current use of insulin (CMS/HCC)    5. Morbidly obese (CMS/HCC)       Currently on Prolia since 2017          Plan:  Prolia injection today from specialty pharmacy.  Continue vitamin D supplementation.  New order for DEXA scan was placed today which should be done as soon as possible when convenient for the patient.  I am recommending continued treatment with Prolia for the patient due to her chronic kidney disease and diabetes.  She will be scheduled for her next Prolia injection in 6 months, and I will plan to see her back in 1 year for recheck.  We have also discussed weight " loss today, and reviewed weightbearing exercise and fall prevention.             MAX Devries  11/21/19  1:41 PM

## 2019-11-26 ENCOUNTER — TELEPHONE (OUTPATIENT)
Dept: ORTHOPEDIC SURGERY | Facility: CLINIC | Age: 74
End: 2019-11-26

## 2019-11-26 NOTE — TELEPHONE ENCOUNTER
Theresa Benitez called stating that the current dx for the dexa scan does not support the exam.  Please advise

## 2019-12-06 ENCOUNTER — HOSPITAL ENCOUNTER (OUTPATIENT)
Dept: BONE DENSITY | Facility: HOSPITAL | Age: 74
Discharge: HOME | End: 2019-12-06
Admitting: PHYSICIAN ASSISTANT

## 2019-12-06 DIAGNOSIS — Z13.820 ENCOUNTER FOR OSTEOPOROSIS SCREENING IN ASYMPTOMATIC POSTMENOPAUSAL PATIENT: ICD-10-CM

## 2019-12-06 DIAGNOSIS — Z78.0 ENCOUNTER FOR OSTEOPOROSIS SCREENING IN ASYMPTOMATIC POSTMENOPAUSAL PATIENT: ICD-10-CM

## 2019-12-06 PROCEDURE — 77080 DXA BONE DENSITY AXIAL: CPT

## 2019-12-27 RX ORDER — SITAGLIPTIN 25 MG/1
TABLET, FILM COATED ORAL
Qty: 90 TABLET | Refills: 1 | Status: SHIPPED | OUTPATIENT
Start: 2019-12-27 | End: 2020-07-06

## 2019-12-30 RX ORDER — ERGOCALCIFEROL 1.25 MG/1
50000 CAPSULE ORAL WEEKLY
Qty: 5 CAPSULE | Refills: 1 | Status: SHIPPED | OUTPATIENT
Start: 2019-12-30 | End: 2019-12-31 | Stop reason: SDUPTHER

## 2019-12-30 RX ORDER — ATORVASTATIN CALCIUM 20 MG/1
20 TABLET, FILM COATED ORAL
Qty: 90 TABLET | Refills: 1 | Status: SHIPPED | OUTPATIENT
Start: 2019-12-30 | End: 2020-10-26

## 2019-12-30 RX ORDER — FERROUS SULFATE 325(65) MG
TABLET ORAL
Qty: 30 TABLET | Refills: 3 | Status: SHIPPED | OUTPATIENT
Start: 2019-12-30 | End: 2019-12-31 | Stop reason: SDUPTHER

## 2019-12-31 RX ORDER — ERGOCALCIFEROL 1.25 MG/1
50000 CAPSULE ORAL WEEKLY
Qty: 5 CAPSULE | Refills: 1 | Status: ON HOLD | OUTPATIENT
Start: 2019-12-31 | End: 2022-08-22

## 2019-12-31 RX ORDER — FERROUS SULFATE 325(65) MG
1 TABLET ORAL DAILY
Qty: 30 TABLET | Refills: 3 | Status: SHIPPED | OUTPATIENT
Start: 2019-12-31 | End: 2020-09-04

## 2020-02-03 RX ORDER — ASPIRIN 81 MG/1
TABLET, COATED ORAL
Qty: 30 TABLET | Refills: 3 | Status: SHIPPED | OUTPATIENT
Start: 2020-02-03 | End: 2020-07-04

## 2020-04-30 ENCOUNTER — TELEPHONE (OUTPATIENT)
Dept: ORTHOPEDIC SURGERY | Facility: CLINIC | Age: 75
End: 2020-04-30

## 2020-05-15 ENCOUNTER — TELEMEDICINE (OUTPATIENT)
Dept: FAMILY MEDICINE CLINIC | Facility: CLINIC | Age: 75
End: 2020-05-15

## 2020-05-15 DIAGNOSIS — I10 ESSENTIAL HYPERTENSION: ICD-10-CM

## 2020-05-15 DIAGNOSIS — E78.2 MIXED HYPERLIPIDEMIA: Primary | ICD-10-CM

## 2020-05-15 PROBLEM — J06.9 ACUTE UPPER RESPIRATORY INFECTION: Status: RESOLVED | Noted: 2017-01-16 | Resolved: 2020-05-15

## 2020-05-15 PROCEDURE — 99213 OFFICE O/P EST LOW 20 MIN: CPT | Performed by: FAMILY MEDICINE

## 2020-05-15 NOTE — PROGRESS NOTES
Korina Mccurdy is a 74 y.o. female.     You have chosen to receive care through a telehealth visit.  Do you consent to use a video/audio connection for your medical care today? Yes    Telemedicine visit in view of COVID-19 pandemic.  Patient present for six-month follow-up on hypertension and hyperlipidemia.      HTN  The patient present for six-month follow-up on hypertension and hyperlipidemia. The problem has been gradually improving since onset. The problem is controlled. Pertinent negatives include no anxiety, blurred vision, chest pain, palpitations, peripheral edema or shortness of breath. Risk factors for coronary artery disease include dyslipidemia. Current antihypertension treatment includes ACE inhibitors, diuretics and lifestyle changes.       The following portions of the patient's history were reviewed and updated as appropriate: past medical history, past social history, past surgical history and problem list.    Review of Systems   Constitutional: Negative for fatigue and fever.   HENT: Negative for congestion and trouble swallowing.    Cardiovascular: Negative for chest pain and palpitations.   Gastrointestinal: Negative for abdominal pain and GERD.   Endocrine: Negative for cold intolerance and polyuria.   Neurological: Negative for dizziness and headache.   Psychiatric/Behavioral: Negative for sleep disturbance and depressed mood. The patient is not nervous/anxious.        Objective   Physical Exam   Constitutional: She is oriented to person, place, and time. She appears well-developed. No distress.   Pulmonary/Chest: Effort normal.   Neurological: She is alert and oriented to person, place, and time.   Psychiatric: She has a normal mood and affect.         Assessment/Plan   Problems Addressed this Visit        Cardiovascular and Mediastinum    Mixed hyperlipidemia - Primary     Lipid abnormalities are improving with treatment.  Nutritional counseling was provided.  Lipids will be  reassessed in 6 months.         Relevant Orders    Comprehensive metabolic panel    Lipid panel    Essential hypertension     Hypertension is improving with treatment.  Continue current treatment regimen.  Dietary sodium restriction.  Regular aerobic exercise.  Blood pressure will be reassessed in 6 months.    This is a video visit, [employer] felicia. was used to complete this visit. Total time of discussion was 15 minutes.         Relevant Orders    Comprehensive metabolic panel    Lipid panel

## 2020-05-15 NOTE — ASSESSMENT & PLAN NOTE
Hypertension is improving with treatment.  Continue current treatment regimen.  Dietary sodium restriction.  Regular aerobic exercise.  Blood pressure will be reassessed in 6 months.    This is a video visit, [place]. was used to complete this visit. Total time of discussion was 15 minutes.

## 2020-06-29 ENCOUNTER — OFFICE VISIT (OUTPATIENT)
Dept: ORTHOPEDIC SURGERY | Facility: CLINIC | Age: 75
End: 2020-06-29

## 2020-06-29 VITALS
WEIGHT: 288.6 LBS | DIASTOLIC BLOOD PRESSURE: 72 MMHG | SYSTOLIC BLOOD PRESSURE: 166 MMHG | HEIGHT: 67 IN | BODY MASS INDEX: 45.3 KG/M2 | HEART RATE: 58 BPM

## 2020-06-29 DIAGNOSIS — Z78.0 OSTEOPENIA AFTER MENOPAUSE: Primary | ICD-10-CM

## 2020-06-29 DIAGNOSIS — M85.80 OSTEOPENIA AFTER MENOPAUSE: Primary | ICD-10-CM

## 2020-06-29 PROCEDURE — 96372 THER/PROPH/DIAG INJ SC/IM: CPT | Performed by: PHYSICIAN ASSISTANT

## 2020-06-29 NOTE — PROGRESS NOTES
Prolia injection  Was not from specialty pharmacy today, however has been from specialty pharmacy in the past and needs to be from specialty pharmacy in the future due to insurances.

## 2020-07-04 RX ORDER — ASPIRIN 81 MG/1
TABLET, COATED ORAL
Qty: 30 TABLET | Refills: 3 | Status: SHIPPED | OUTPATIENT
Start: 2020-07-04 | End: 2020-11-15

## 2020-07-06 RX ORDER — SITAGLIPTIN 25 MG/1
TABLET, FILM COATED ORAL
Qty: 90 TABLET | Refills: 2 | Status: SHIPPED | OUTPATIENT
Start: 2020-07-06 | End: 2020-12-30

## 2020-08-03 RX ORDER — ATENOLOL 100 MG/1
100 TABLET ORAL DAILY
Qty: 90 TABLET | Refills: 2 | Status: SHIPPED | OUTPATIENT
Start: 2020-08-03 | End: 2022-08-16 | Stop reason: SDUPTHER

## 2020-09-04 RX ORDER — FERROUS SULFATE 325(65) MG
TABLET ORAL
Qty: 30 TABLET | Refills: 2 | Status: SHIPPED | OUTPATIENT
Start: 2020-09-04 | End: 2020-12-12

## 2020-09-04 RX ORDER — AMLODIPINE BESYLATE 10 MG/1
TABLET ORAL
Qty: 90 TABLET | Refills: 2 | Status: SHIPPED | OUTPATIENT
Start: 2020-09-04 | End: 2022-08-16 | Stop reason: SDUPTHER

## 2020-10-26 RX ORDER — ATORVASTATIN CALCIUM 20 MG/1
TABLET, FILM COATED ORAL
Qty: 90 TABLET | Refills: 2 | Status: SHIPPED | OUTPATIENT
Start: 2020-10-26 | End: 2022-08-16 | Stop reason: SDUPTHER

## 2020-11-15 RX ORDER — ASPIRIN 81 MG/1
TABLET ORAL
Qty: 30 TABLET | Refills: 2 | Status: ON HOLD | OUTPATIENT
Start: 2020-11-15 | End: 2022-09-16

## 2020-12-02 ENCOUNTER — TELEPHONE (OUTPATIENT)
Dept: ORTHOPEDIC SURGERY | Facility: CLINIC | Age: 75
End: 2020-12-02

## 2020-12-02 NOTE — TELEPHONE ENCOUNTER
Call placed to [facility] pharmacy, s/w [facility] Pharmacist to give new script for Prolia with one refill. They will ship the medication once it is ready.

## 2020-12-04 ENCOUNTER — TELEPHONE (OUTPATIENT)
Dept: ORTHOPEDIC SURGERY | Facility: CLINIC | Age: 75
End: 2020-12-04

## 2020-12-12 RX ORDER — FERROUS SULFATE 325(65) MG
TABLET ORAL
Qty: 30 TABLET | Refills: 1 | Status: ON HOLD | OUTPATIENT
Start: 2020-12-12 | End: 2022-08-22

## 2020-12-29 ENCOUNTER — TELEPHONE (OUTPATIENT)
Dept: ORTHOPEDIC SURGERY | Facility: CLINIC | Age: 75
End: 2020-12-29

## 2020-12-29 DIAGNOSIS — E55.9 VITAMIN D DEFICIENCY: ICD-10-CM

## 2020-12-29 DIAGNOSIS — Z78.0 OSTEOPENIA AFTER MENOPAUSE: ICD-10-CM

## 2020-12-29 DIAGNOSIS — M85.80 OSTEOPENIA AFTER MENOPAUSE: ICD-10-CM

## 2020-12-29 DIAGNOSIS — Z51.81 MEDICATION MONITORING ENCOUNTER: Primary | ICD-10-CM

## 2020-12-29 NOTE — TELEPHONE ENCOUNTER
Call placed to patient's daughter, left message on machine advising patient had appt this morning that she missed. Advised that we did need to get her back in office for her appt and prolia injection but that we also needed her to have updated labs done. Advised I placed the orders for her to have them at [facility]. Advised to let me know when she has them done and we can get her mother back on the schedule.

## 2020-12-30 RX ORDER — SITAGLIPTIN 25 MG/1
TABLET, FILM COATED ORAL
Qty: 90 TABLET | Refills: 1 | Status: SHIPPED | OUTPATIENT
Start: 2020-12-30 | End: 2022-08-17 | Stop reason: SDUPTHER

## 2021-01-04 ENCOUNTER — TELEPHONE (OUTPATIENT)
Dept: ORTHOPEDIC SURGERY | Facility: CLINIC | Age: 76
End: 2021-01-04

## 2021-02-23 ENCOUNTER — TELEPHONE (OUTPATIENT)
Dept: ORTHOPEDIC SURGERY | Facility: CLINIC | Age: 76
End: 2021-02-23

## 2021-02-23 NOTE — TELEPHONE ENCOUNTER
Call placed to patient's daughter, left message on machine advising that we did need to get her mother back in office for her appt and prolia injection but that we also needed her to have updated labs done. Advised we already have the Prolia in the office from the [facility] pharmacy. Advised I placed the orders for her labs to be drawn at [facility]. Advised to let me know when she has them done and we can get her mother back on the schedule.

## 2022-08-16 ENCOUNTER — OFFICE VISIT (OUTPATIENT)
Dept: FAMILY MEDICINE CLINIC | Facility: CLINIC | Age: 77
End: 2022-08-16

## 2022-08-16 ENCOUNTER — LAB (OUTPATIENT)
Dept: FAMILY MEDICINE CLINIC | Facility: CLINIC | Age: 77
End: 2022-08-16

## 2022-08-16 VITALS
HEART RATE: 97 BPM | BODY MASS INDEX: 41.72 KG/M2 | RESPIRATION RATE: 16 BRPM | TEMPERATURE: 97.8 F | HEIGHT: 67 IN | SYSTOLIC BLOOD PRESSURE: 130 MMHG | DIASTOLIC BLOOD PRESSURE: 90 MMHG | OXYGEN SATURATION: 95 % | WEIGHT: 265.8 LBS

## 2022-08-16 DIAGNOSIS — E78.2 MIXED HYPERLIPIDEMIA: ICD-10-CM

## 2022-08-16 DIAGNOSIS — I10 ESSENTIAL HYPERTENSION: ICD-10-CM

## 2022-08-16 DIAGNOSIS — E11.65 TYPE 2 DIABETES MELLITUS WITH HYPERGLYCEMIA, WITHOUT LONG-TERM CURRENT USE OF INSULIN: ICD-10-CM

## 2022-08-16 DIAGNOSIS — E78.2 MIXED HYPERLIPIDEMIA: Primary | ICD-10-CM

## 2022-08-16 PROCEDURE — 99214 OFFICE O/P EST MOD 30 MIN: CPT | Performed by: FAMILY MEDICINE

## 2022-08-16 PROCEDURE — 82043 UR ALBUMIN QUANTITATIVE: CPT | Performed by: FAMILY MEDICINE

## 2022-08-16 RX ORDER — AMLODIPINE BESYLATE 10 MG/1
10 TABLET ORAL DAILY
Qty: 90 TABLET | Refills: 2 | Status: ON HOLD | OUTPATIENT
Start: 2022-08-16 | End: 2022-09-02 | Stop reason: SDUPTHER

## 2022-08-16 RX ORDER — ATORVASTATIN CALCIUM 20 MG/1
20 TABLET, FILM COATED ORAL
Qty: 90 TABLET | Refills: 2 | Status: ON HOLD | OUTPATIENT
Start: 2022-08-16 | End: 2022-09-02 | Stop reason: SDUPTHER

## 2022-08-16 RX ORDER — ATENOLOL 100 MG/1
100 TABLET ORAL DAILY
Qty: 90 TABLET | Refills: 2 | Status: SHIPPED | OUTPATIENT
Start: 2022-08-16 | End: 2022-09-03 | Stop reason: HOSPADM

## 2022-08-16 NOTE — PROGRESS NOTES
Subjective   Abiola Mccurdy is a 77 y.o. female.     77-year-old female patient present for follow-up on hypertension, hyperlipidemia and diabetes.  She was noncompliant with  follow-up appointment and is seen after 2 years.  Patient states she is not taking any medication currently stopped  by herself  when she ran out of the refills about a year or so.  She was used to see [facility] for her diabetic management but was unable to keep appointment over there.     Diabetes  She presents for her follow-up diabetic visit. She has type 2 diabetes mellitus. Her disease course has been fluctuating. There are no hypoglycemic associated symptoms. Pertinent negatives for hypoglycemia include no confusion, dizziness or tremors. Associated symptoms include fatigue. Pertinent negatives for diabetes include no blurred vision, no chest pain, no foot ulcerations, no polydipsia, no polyphagia, no polyuria, no visual change and no weakness. There are no hypoglycemic complications. Symptoms are stable. Diabetic complications include nephropathy. Risk factors for coronary artery disease include hypertension and dyslipidemia. When asked about current treatments, none were reported. She is following a generally unhealthy diet. She never participates in exercise. Home blood sugar record trend: Does not monitor blood sugar.   HTN  The patient present for  follow-up on hypertension and hyperlipidemia. The problem has been unchanged. Pertinent negatives include no blurred vision, chest pain, palpitations, peripheral edema or shortness of breath.  Patient currently is  not taking any antihypertensive medication due to noncompliant with appointment and ran out of the medications.    The following portions of the patient's history were reviewed and updated as appropriate: past medical history, past social history, past surgical history and problem list.    Review of Systems   Constitutional: Positive for fatigue. Negative for  activity change and appetite change.   HENT: Negative for sinus pressure, sore throat and trouble swallowing.    Eyes: Negative for blurred vision and visual disturbance.   Respiratory: Negative for cough, shortness of breath and wheezing.    Cardiovascular: Negative for chest pain.   Gastrointestinal: Negative for abdominal pain, nausea, vomiting and indigestion.   Endocrine: Negative for cold intolerance, heat intolerance, polydipsia, polyphagia and polyuria.   Genitourinary: Negative for frequency and urgency.   Neurological: Negative for dizziness, tremors, syncope, weakness, headache and confusion.       Objective   Physical Exam  Vitals reviewed.   Constitutional:       General: She is not in acute distress.     Appearance: She is well-developed.   Eyes:      Conjunctiva/sclera: Conjunctivae normal.      Pupils: Pupils are equal, round, and reactive to light.   Neck:      Thyroid: No thyromegaly.   Cardiovascular:      Heart sounds: Normal heart sounds.   Pulmonary:      Effort: Pulmonary effort is normal.      Breath sounds: Normal breath sounds. No wheezing.   Abdominal:      General: Bowel sounds are normal.      Palpations: Abdomen is soft.   Musculoskeletal:         General: Normal range of motion.      Cervical back: Normal range of motion and neck supple.   Neurological:      Mental Status: She is alert and oriented to person, place, and time.   Psychiatric:         Mood and Affect: Mood normal.       Vitals:    08/16/22 1257   BP: 130/90   Pulse: 97   Resp: 16   Temp: 97.8 °F (36.6 °C)   SpO2: 95%     No current facility-administered medications on file prior to visit.     Current Outpatient Medications on File Prior to Visit   Medication Sig Dispense Refill   • acetaminophen (TYLENOL) 325 MG tablet Take 2 tablets by mouth Daily As Needed.     • Aspirin Adult Low Strength 81 MG EC tablet TAKE ONE TABLET BY MOUTH EVERY DAY 30 tablet 2   • RAMU MICROLET LANCETS lancets RAMU MICROLET LANCETS     •  glucose blood test strip CONTOUR NEXT TEST STRP     • latanoprost (XALATAN) 0.005 % ophthalmic solution INSTILL 1 DROP INTO THE AFFECTED EYE(S) IN THE EVENING  2           Assessment & Plan   Problems Addressed this Visit        Cardiac and Vasculature    Mixed hyperlipidemia - Primary     Lipid abnormalities are worsening.  prescription sent atorvastatin  Nutritional counseling was provided.  Lipids will be reassessed in 3 months.         Relevant Medications    atorvastatin (LIPITOR) 20 MG tablet    Other Relevant Orders    Comprehensive metabolic panel    Lipid panel    CBC w AUTO Differential    Essential hypertension     Hypertension is worsening.    Discussed medication compliance prescription refilled amlodipine and atenolol.  Dietary sodium restriction.  Weight loss.  Regular aerobic exercise.  Blood pressure will be reassessed in 3 months.         Relevant Medications    atenolol (TENORMIN) 100 MG tablet    amLODIPine (NORVASC) 10 MG tablet    Other Relevant Orders    Comprehensive metabolic panel    Lipid panel    CBC w AUTO Differential       Endocrine and Metabolic    Type 2 diabetes mellitus with hyperglycemia, without long-term current use of insulin (HCC)     Diabetes is worsening. Complications of  diabetes discussed with the patient in detail including micro-and macro vascular complications.  Advised to check blood sugar daily and follow lifestyle changes.    We will check fasting lipid panel, CMP, hemoglobin A1c and CBC.  Refill medication.  Dietary recommendations for ADA diet.  Regular aerobic exercise.  Discussed ways to avoid symptomatic hypoglycemia.  Discussed sick day management.  Discussed foot care.  Reminded to get yearly retinal exam.  Diabetes will be reassessed in 3 months.         Relevant Orders    Comprehensive metabolic panel    Hemoglobin A1c    Lipid panel    MicroAlbumin, Urine, Random - Urine, Clean Catch (Completed)      Diagnoses       Codes Comments    Mixed hyperlipidemia     -  Primary ICD-10-CM: E78.2  ICD-9-CM: 272.2     Essential hypertension     ICD-10-CM: I10  ICD-9-CM: 401.9     Type 2 diabetes mellitus with hyperglycemia, without long-term current use of insulin (HCC)     ICD-10-CM: E11.65  ICD-9-CM: 250.00, 790.29

## 2022-08-17 LAB — ALBUMIN UR-MCNC: 350 MG/DL

## 2022-08-17 NOTE — TELEPHONE ENCOUNTER
Caller: MANUEL BRUNO    Relationship: Emergency Contact    Best call back number: [phone]    Requested Prescriptions:   Requested Prescriptions     Pending Prescriptions Disp Refills   • SITagliptin (Januvia) 25 MG tablet 90 tablet 1     Sig: Take 1 tablet by mouth Daily.        Pharmacy where request should be sent: [address] [phone] [place] [phone] [place]     Additional details provided by patient: PATIENT'S DAUGHTER STAETS SHE RECENTLY HAD AN APPOINTMENT TO RESTART SEVERAL OF HER MEDICATIONS. THEY RECEIVED 3 OTHER MEDICATIONS THAT WERE CALLED IN, BUT NO PRESCRIPTION FOR Januvia 25 MG tablet WAS AVAILABLE. REQUESTS PRESCRIPTION BE RESENT TO START MEDICATION AS SOON AS ABLE    Does the patient have less than a 3 day supply:  [x] Yes  [] No    Shannan Crowley Rep   08/17/22 13:19 EDT

## 2022-08-22 ENCOUNTER — APPOINTMENT (OUTPATIENT)
Dept: GENERAL RADIOLOGY | Facility: HOSPITAL | Age: 77
End: 2022-08-22

## 2022-08-22 ENCOUNTER — HOSPITAL ENCOUNTER (INPATIENT)
Facility: HOSPITAL | Age: 77
LOS: 9 days | Discharge: NURSING, REHAB OR LONG-TERM CARE | End: 2022-09-03
Attending: EMERGENCY MEDICINE | Admitting: EMERGENCY MEDICINE

## 2022-08-22 DIAGNOSIS — N17.9 ACUTE RENAL FAILURE SUPERIMPOSED ON CHRONIC KIDNEY DISEASE, UNSPECIFIED CKD STAGE, UNSPECIFIED ACUTE RENAL FAILURE TYPE: Primary | ICD-10-CM

## 2022-08-22 DIAGNOSIS — N18.9 ACUTE RENAL FAILURE SUPERIMPOSED ON CHRONIC KIDNEY DISEASE, UNSPECIFIED CKD STAGE, UNSPECIFIED ACUTE RENAL FAILURE TYPE: Primary | ICD-10-CM

## 2022-08-22 DIAGNOSIS — N39.0 URINARY TRACT INFECTION WITHOUT HEMATURIA, SITE UNSPECIFIED: ICD-10-CM

## 2022-08-22 DIAGNOSIS — M54.2 NECK PAIN: ICD-10-CM

## 2022-08-22 DIAGNOSIS — M54.2 NECK PAIN ON RIGHT SIDE: ICD-10-CM

## 2022-08-22 LAB
ANION GAP SERPL CALCULATED.3IONS-SCNC: 14 MMOL/L (ref 5–15)
BACTERIA UR QL AUTO: ABNORMAL /HPF
BASOPHILS # BLD AUTO: 0 10*3/MM3 (ref 0–0.2)
BASOPHILS NFR BLD AUTO: 0.4 % (ref 0–1.5)
BILIRUB UR QL STRIP: NEGATIVE
BUN SERPL-MCNC: 51 MG/DL (ref 8–23)
BUN/CREAT SERPL: 13.2 (ref 7–25)
CALCIUM SPEC-SCNC: 8.9 MG/DL (ref 8.6–10.5)
CHLORIDE SERPL-SCNC: 104 MMOL/L (ref 98–107)
CLARITY UR: ABNORMAL
CO2 SERPL-SCNC: 18 MMOL/L (ref 22–29)
COLOR UR: YELLOW
CREAT SERPL-MCNC: 3.86 MG/DL (ref 0.57–1)
CRP SERPL-MCNC: 8.82 MG/DL (ref 0–0.5)
DEPRECATED RDW RBC AUTO: 42.9 FL (ref 37–54)
EGFRCR SERPLBLD CKD-EPI 2021: 11.6 ML/MIN/1.73
EOSINOPHIL # BLD AUTO: 0.1 10*3/MM3 (ref 0–0.4)
EOSINOPHIL NFR BLD AUTO: 1.2 % (ref 0.3–6.2)
ERYTHROCYTE [DISTWIDTH] IN BLOOD BY AUTOMATED COUNT: 14.2 % (ref 12.3–15.4)
ERYTHROCYTE [SEDIMENTATION RATE] IN BLOOD: 82 MM/HR (ref 0–30)
GLUCOSE SERPL-MCNC: 154 MG/DL (ref 65–99)
GLUCOSE UR STRIP-MCNC: NEGATIVE MG/DL
HCT VFR BLD AUTO: 29.7 % (ref 34–46.6)
HGB BLD-MCNC: 9.5 G/DL (ref 12–15.9)
HGB UR QL STRIP.AUTO: ABNORMAL
HYALINE CASTS UR QL AUTO: ABNORMAL /LPF
KETONES UR QL STRIP: ABNORMAL
LEUKOCYTE ESTERASE UR QL STRIP.AUTO: ABNORMAL
LYMPHOCYTES # BLD AUTO: 1 10*3/MM3 (ref 0.7–3.1)
LYMPHOCYTES NFR BLD AUTO: 15 % (ref 19.6–45.3)
MCH RBC QN AUTO: 26.9 PG (ref 26.6–33)
MCHC RBC AUTO-ENTMCNC: 32 G/DL (ref 31.5–35.7)
MCV RBC AUTO: 84.1 FL (ref 79–97)
MONOCYTES # BLD AUTO: 1 10*3/MM3 (ref 0.1–0.9)
MONOCYTES NFR BLD AUTO: 14 % (ref 5–12)
NEUTROPHILS NFR BLD AUTO: 4.7 10*3/MM3 (ref 1.7–7)
NEUTROPHILS NFR BLD AUTO: 69.4 % (ref 42.7–76)
NITRITE UR QL STRIP: NEGATIVE
NRBC BLD AUTO-RTO: 0.1 /100 WBC (ref 0–0.2)
PH UR STRIP.AUTO: 5.5 [PH] (ref 5–8)
PLATELET # BLD AUTO: 382 10*3/MM3 (ref 140–450)
PMV BLD AUTO: 8 FL (ref 6–12)
POTASSIUM SERPL-SCNC: 4.4 MMOL/L (ref 3.5–5.2)
PROT UR QL STRIP: ABNORMAL
RBC # BLD AUTO: 3.54 10*6/MM3 (ref 3.77–5.28)
RBC # UR STRIP: ABNORMAL /HPF
REF LAB TEST METHOD: ABNORMAL
SARS-COV-2 RNA PNL SPEC NAA+PROBE: NOT DETECTED
SODIUM SERPL-SCNC: 136 MMOL/L (ref 136–145)
SP GR UR STRIP: 1.02 (ref 1–1.03)
SQUAMOUS #/AREA URNS HPF: ABNORMAL /HPF
UROBILINOGEN UR QL STRIP: ABNORMAL
WBC # UR STRIP: ABNORMAL /HPF
WBC NRBC COR # BLD: 6.8 10*3/MM3 (ref 3.4–10.8)

## 2022-08-22 PROCEDURE — 85025 COMPLETE CBC W/AUTO DIFF WBC: CPT | Performed by: EMERGENCY MEDICINE

## 2022-08-22 PROCEDURE — 80048 BASIC METABOLIC PNL TOTAL CA: CPT | Performed by: EMERGENCY MEDICINE

## 2022-08-22 PROCEDURE — 86140 C-REACTIVE PROTEIN: CPT | Performed by: NURSE PRACTITIONER

## 2022-08-22 PROCEDURE — 25010000002 ONDANSETRON PER 1 MG: Performed by: NURSE PRACTITIONER

## 2022-08-22 PROCEDURE — G0378 HOSPITAL OBSERVATION PER HR: HCPCS

## 2022-08-22 PROCEDURE — 80048 BASIC METABOLIC PNL TOTAL CA: CPT | Performed by: NURSE PRACTITIONER

## 2022-08-22 PROCEDURE — 83540 ASSAY OF IRON: CPT | Performed by: PHYSICIAN ASSISTANT

## 2022-08-22 PROCEDURE — 84466 ASSAY OF TRANSFERRIN: CPT | Performed by: PHYSICIAN ASSISTANT

## 2022-08-22 PROCEDURE — P9612 CATHETERIZE FOR URINE SPEC: HCPCS

## 2022-08-22 PROCEDURE — 25010000002 CEFTRIAXONE PER 250 MG: Performed by: NURSE PRACTITIONER

## 2022-08-22 PROCEDURE — 81001 URINALYSIS AUTO W/SCOPE: CPT | Performed by: NURSE PRACTITIONER

## 2022-08-22 PROCEDURE — 72050 X-RAY EXAM NECK SPINE 4/5VWS: CPT

## 2022-08-22 PROCEDURE — 85652 RBC SED RATE AUTOMATED: CPT | Performed by: NURSE PRACTITIONER

## 2022-08-22 PROCEDURE — 99284 EMERGENCY DEPT VISIT MOD MDM: CPT

## 2022-08-22 PROCEDURE — 85025 COMPLETE CBC W/AUTO DIFF WBC: CPT | Performed by: NURSE PRACTITIONER

## 2022-08-22 PROCEDURE — 87086 URINE CULTURE/COLONY COUNT: CPT | Performed by: NURSE PRACTITIONER

## 2022-08-22 PROCEDURE — 36415 COLL VENOUS BLD VENIPUNCTURE: CPT | Performed by: EMERGENCY MEDICINE

## 2022-08-22 PROCEDURE — 87635 SARS-COV-2 COVID-19 AMP PRB: CPT | Performed by: EMERGENCY MEDICINE

## 2022-08-22 PROCEDURE — 25010000002 MORPHINE PER 10 MG: Performed by: NURSE PRACTITIONER

## 2022-08-22 RX ORDER — SODIUM CHLORIDE 0.9 % (FLUSH) 0.9 %
10 SYRINGE (ML) INJECTION AS NEEDED
Status: DISCONTINUED | OUTPATIENT
Start: 2022-08-22 | End: 2022-09-03 | Stop reason: HOSPADM

## 2022-08-22 RX ORDER — SODIUM CHLORIDE 0.9 % (FLUSH) 0.9 %
10 SYRINGE (ML) INJECTION EVERY 12 HOURS SCHEDULED
Status: DISCONTINUED | OUTPATIENT
Start: 2022-08-22 | End: 2022-09-03 | Stop reason: HOSPADM

## 2022-08-22 RX ORDER — CHOLECALCIFEROL (VITAMIN D3) 125 MCG
5 CAPSULE ORAL NIGHTLY PRN
Status: DISCONTINUED | OUTPATIENT
Start: 2022-08-22 | End: 2022-09-03 | Stop reason: HOSPADM

## 2022-08-22 RX ORDER — LIDOCAINE 50 MG/G
1 PATCH TOPICAL ONCE
Status: COMPLETED | OUTPATIENT
Start: 2022-08-22 | End: 2022-08-23

## 2022-08-22 RX ORDER — ONDANSETRON 2 MG/ML
4 INJECTION INTRAMUSCULAR; INTRAVENOUS EVERY 6 HOURS PRN
Status: DISCONTINUED | OUTPATIENT
Start: 2022-08-22 | End: 2022-09-03 | Stop reason: HOSPADM

## 2022-08-22 RX ORDER — METHOCARBAMOL 500 MG/1
500 TABLET, FILM COATED ORAL ONCE
Status: COMPLETED | OUTPATIENT
Start: 2022-08-22 | End: 2022-08-22

## 2022-08-22 RX ORDER — MORPHINE SULFATE 2 MG/ML
2 INJECTION, SOLUTION INTRAMUSCULAR; INTRAVENOUS ONCE
Status: COMPLETED | OUTPATIENT
Start: 2022-08-22 | End: 2022-08-22

## 2022-08-22 RX ORDER — ATORVASTATIN CALCIUM 20 MG/1
20 TABLET, FILM COATED ORAL NIGHTLY
Status: DISCONTINUED | OUTPATIENT
Start: 2022-08-22 | End: 2022-08-27

## 2022-08-22 RX ORDER — ACETAMINOPHEN 325 MG/1
650 TABLET ORAL EVERY 4 HOURS PRN
Status: DISCONTINUED | OUTPATIENT
Start: 2022-08-22 | End: 2022-09-03 | Stop reason: HOSPADM

## 2022-08-22 RX ORDER — ONDANSETRON 2 MG/ML
4 INJECTION INTRAMUSCULAR; INTRAVENOUS ONCE
Status: COMPLETED | OUTPATIENT
Start: 2022-08-22 | End: 2022-08-22

## 2022-08-22 RX ADMIN — ATORVASTATIN CALCIUM 20 MG: 20 TABLET, FILM COATED ORAL at 21:17

## 2022-08-22 RX ADMIN — SODIUM CHLORIDE 500 ML: 9 INJECTION, SOLUTION INTRAVENOUS at 17:24

## 2022-08-22 RX ADMIN — ONDANSETRON 4 MG: 2 INJECTION INTRAMUSCULAR; INTRAVENOUS at 16:18

## 2022-08-22 RX ADMIN — CEFTRIAXONE 1 G: 1 INJECTION, POWDER, FOR SOLUTION INTRAMUSCULAR; INTRAVENOUS at 19:26

## 2022-08-22 RX ADMIN — Medication 10 ML: at 20:33

## 2022-08-22 RX ADMIN — LIDOCAINE 1 PATCH: 50 PATCH CUTANEOUS at 16:18

## 2022-08-22 RX ADMIN — METHOCARBAMOL 500 MG: 500 TABLET ORAL at 16:18

## 2022-08-22 RX ADMIN — MORPHINE SULFATE 2 MG: 2 INJECTION, SOLUTION INTRAMUSCULAR; INTRAVENOUS at 16:18

## 2022-08-23 ENCOUNTER — APPOINTMENT (OUTPATIENT)
Dept: ULTRASOUND IMAGING | Facility: HOSPITAL | Age: 77
End: 2022-08-23

## 2022-08-23 PROBLEM — M54.2 NECK PAIN ON RIGHT SIDE: Status: ACTIVE | Noted: 2022-08-23

## 2022-08-23 PROBLEM — N18.30 STAGE 3 CHRONIC KIDNEY DISEASE (HCC): Chronic | Status: ACTIVE | Noted: 2018-11-15

## 2022-08-23 LAB
ANION GAP SERPL CALCULATED.3IONS-SCNC: 14 MMOL/L (ref 5–15)
BASOPHILS # BLD AUTO: 0 10*3/MM3 (ref 0–0.2)
BASOPHILS NFR BLD AUTO: 0.3 % (ref 0–1.5)
BUN SERPL-MCNC: 52 MG/DL (ref 8–23)
BUN/CREAT SERPL: 13.9 (ref 7–25)
C3 SERPL-MCNC: 158 MG/DL (ref 82–167)
C4 SERPL-MCNC: 52 MG/DL (ref 14–44)
CALCIUM SPEC-SCNC: 8.7 MG/DL (ref 8.6–10.5)
CHLORIDE SERPL-SCNC: 107 MMOL/L (ref 98–107)
CHLORIDE UR-SCNC: 59 MMOL/L
CO2 SERPL-SCNC: 16 MMOL/L (ref 22–29)
CREAT SERPL-MCNC: 3.75 MG/DL (ref 0.57–1)
DEPRECATED RDW RBC AUTO: 47.7 FL (ref 37–54)
EGFRCR SERPLBLD CKD-EPI 2021: 12.1 ML/MIN/1.73
EOSINOPHIL # BLD AUTO: 0.1 10*3/MM3 (ref 0–0.4)
EOSINOPHIL NFR BLD AUTO: 1.1 % (ref 0.3–6.2)
ERYTHROCYTE [DISTWIDTH] IN BLOOD BY AUTOMATED COUNT: 14.7 % (ref 12.3–15.4)
GLUCOSE BLDC GLUCOMTR-MCNC: 122 MG/DL (ref 70–105)
GLUCOSE BLDC GLUCOMTR-MCNC: 147 MG/DL (ref 70–105)
GLUCOSE BLDC GLUCOMTR-MCNC: 167 MG/DL (ref 70–105)
GLUCOSE BLDC GLUCOMTR-MCNC: 168 MG/DL (ref 70–105)
GLUCOSE SERPL-MCNC: 179 MG/DL (ref 65–99)
HCT VFR BLD AUTO: 32.5 % (ref 34–46.6)
HGB BLD-MCNC: 9.8 G/DL (ref 12–15.9)
IRON 24H UR-MRATE: 14 MCG/DL (ref 37–145)
IRON SATN MFR SERPL: 6 % (ref 20–50)
LYMPHOCYTES # BLD AUTO: 1.1 10*3/MM3 (ref 0.7–3.1)
LYMPHOCYTES NFR BLD AUTO: 13.3 % (ref 19.6–45.3)
MCH RBC QN AUTO: 27 PG (ref 26.6–33)
MCHC RBC AUTO-ENTMCNC: 30.1 G/DL (ref 31.5–35.7)
MCV RBC AUTO: 89.6 FL (ref 79–97)
MONOCYTES # BLD AUTO: 0.9 10*3/MM3 (ref 0.1–0.9)
MONOCYTES NFR BLD AUTO: 12 % (ref 5–12)
NEUTROPHILS NFR BLD AUTO: 5.8 10*3/MM3 (ref 1.7–7)
NEUTROPHILS NFR BLD AUTO: 73.3 % (ref 42.7–76)
NRBC BLD AUTO-RTO: 0.1 /100 WBC (ref 0–0.2)
PLATELET # BLD AUTO: 387 10*3/MM3 (ref 140–450)
PMV BLD AUTO: 8.6 FL (ref 6–12)
POTASSIUM SERPL-SCNC: 4.5 MMOL/L (ref 3.5–5.2)
PROT ?TM UR-MCNC: 330.2 MG/DL
RBC # BLD AUTO: 3.63 10*6/MM3 (ref 3.77–5.28)
SODIUM SERPL-SCNC: 137 MMOL/L (ref 136–145)
SODIUM UR-SCNC: 81 MMOL/L
TIBC SERPL-MCNC: 221 MCG/DL (ref 298–536)
TRANSFERRIN SERPL-MCNC: 148 MG/DL (ref 200–360)
WBC NRBC COR # BLD: 7.9 10*3/MM3 (ref 3.4–10.8)

## 2022-08-23 PROCEDURE — 84300 ASSAY OF URINE SODIUM: CPT | Performed by: INTERNAL MEDICINE

## 2022-08-23 PROCEDURE — 99233 SBSQ HOSP IP/OBS HIGH 50: CPT | Performed by: NURSE PRACTITIONER

## 2022-08-23 PROCEDURE — 86038 ANTINUCLEAR ANTIBODIES: CPT | Performed by: INTERNAL MEDICINE

## 2022-08-23 PROCEDURE — 82436 ASSAY OF URINE CHLORIDE: CPT | Performed by: INTERNAL MEDICINE

## 2022-08-23 PROCEDURE — 86037 ANCA TITER EACH ANTIBODY: CPT | Performed by: INTERNAL MEDICINE

## 2022-08-23 PROCEDURE — 83521 IG LIGHT CHAINS FREE EACH: CPT | Performed by: INTERNAL MEDICINE

## 2022-08-23 PROCEDURE — 86160 COMPLEMENT ANTIGEN: CPT | Performed by: INTERNAL MEDICINE

## 2022-08-23 PROCEDURE — G0108 DIAB MANAGE TRN  PER INDIV: HCPCS

## 2022-08-23 PROCEDURE — 63710000001 INSULIN LISPRO (HUMAN) PER 5 UNITS: Performed by: PHYSICIAN ASSISTANT

## 2022-08-23 PROCEDURE — 83516 IMMUNOASSAY NONANTIBODY: CPT | Performed by: INTERNAL MEDICINE

## 2022-08-23 PROCEDURE — G0378 HOSPITAL OBSERVATION PER HR: HCPCS

## 2022-08-23 PROCEDURE — 76775 US EXAM ABDO BACK WALL LIM: CPT

## 2022-08-23 PROCEDURE — 84155 ASSAY OF PROTEIN SERUM: CPT | Performed by: INTERNAL MEDICINE

## 2022-08-23 PROCEDURE — 84156 ASSAY OF PROTEIN URINE: CPT | Performed by: INTERNAL MEDICINE

## 2022-08-23 PROCEDURE — 84165 PROTEIN E-PHORESIS SERUM: CPT | Performed by: INTERNAL MEDICINE

## 2022-08-23 PROCEDURE — 82570 ASSAY OF URINE CREATININE: CPT | Performed by: INTERNAL MEDICINE

## 2022-08-23 PROCEDURE — 86334 IMMUNOFIX E-PHORESIS SERUM: CPT | Performed by: INTERNAL MEDICINE

## 2022-08-23 PROCEDURE — 82962 GLUCOSE BLOOD TEST: CPT

## 2022-08-23 PROCEDURE — 82784 ASSAY IGA/IGD/IGG/IGM EACH: CPT | Performed by: INTERNAL MEDICINE

## 2022-08-23 PROCEDURE — 25010000002 NA FERRIC GLUC CPLX PER 12.5 MG: Performed by: INTERNAL MEDICINE

## 2022-08-23 RX ORDER — DEXTROSE MONOHYDRATE 25 G/50ML
25 INJECTION, SOLUTION INTRAVENOUS
Status: DISCONTINUED | OUTPATIENT
Start: 2022-08-23 | End: 2022-09-03 | Stop reason: HOSPADM

## 2022-08-23 RX ORDER — ASPIRIN 81 MG/1
81 TABLET ORAL DAILY
Status: DISCONTINUED | OUTPATIENT
Start: 2022-08-23 | End: 2022-08-24

## 2022-08-23 RX ORDER — INSULIN LISPRO 100 [IU]/ML
0-9 INJECTION, SOLUTION INTRAVENOUS; SUBCUTANEOUS
Status: DISCONTINUED | OUTPATIENT
Start: 2022-08-23 | End: 2022-09-01

## 2022-08-23 RX ORDER — SODIUM CHLORIDE 9 MG/ML
75 INJECTION, SOLUTION INTRAVENOUS CONTINUOUS
Status: DISCONTINUED | OUTPATIENT
Start: 2022-08-23 | End: 2022-08-24

## 2022-08-23 RX ORDER — HYDROCODONE BITARTRATE AND ACETAMINOPHEN 5; 325 MG/1; MG/1
1 TABLET ORAL EVERY 6 HOURS PRN
Status: DISPENSED | OUTPATIENT
Start: 2022-08-23 | End: 2022-08-30

## 2022-08-23 RX ORDER — AMLODIPINE BESYLATE 5 MG/1
10 TABLET ORAL DAILY
Status: DISCONTINUED | OUTPATIENT
Start: 2022-08-23 | End: 2022-09-03 | Stop reason: HOSPADM

## 2022-08-23 RX ORDER — SODIUM BICARBONATE 650 MG/1
1300 TABLET ORAL 3 TIMES DAILY
Status: DISCONTINUED | OUTPATIENT
Start: 2022-08-23 | End: 2022-08-30

## 2022-08-23 RX ORDER — LANCETS
1 EACH MISCELLANEOUS DAILY
Qty: 100 EACH | Refills: 2 | Status: CANCELLED | OUTPATIENT
Start: 2022-08-23

## 2022-08-23 RX ORDER — LIDOCAINE 50 MG/G
1 PATCH TOPICAL
Status: DISCONTINUED | OUTPATIENT
Start: 2022-08-24 | End: 2022-09-03 | Stop reason: HOSPADM

## 2022-08-23 RX ORDER — OLANZAPINE 10 MG/2ML
1 INJECTION, POWDER, LYOPHILIZED, FOR SOLUTION INTRAMUSCULAR
Status: DISCONTINUED | OUTPATIENT
Start: 2022-08-23 | End: 2022-09-03 | Stop reason: HOSPADM

## 2022-08-23 RX ORDER — CYCLOSPORINE 0.5 MG/ML
1 EMULSION OPHTHALMIC 2 TIMES DAILY
COMMUNITY
End: 2022-09-03 | Stop reason: HOSPADM

## 2022-08-23 RX ORDER — NICOTINE POLACRILEX 4 MG
15 LOZENGE BUCCAL
Status: DISCONTINUED | OUTPATIENT
Start: 2022-08-23 | End: 2022-09-03 | Stop reason: HOSPADM

## 2022-08-23 RX ORDER — PERPHENAZINE 16 MG/1
1 TABLET, FILM COATED ORAL DAILY
Qty: 50 EACH | Refills: 2 | Status: CANCELLED | OUTPATIENT
Start: 2022-08-23

## 2022-08-23 RX ORDER — ATENOLOL 50 MG/1
100 TABLET ORAL DAILY
Status: DISCONTINUED | OUTPATIENT
Start: 2022-08-23 | End: 2022-08-26

## 2022-08-23 RX ORDER — CYCLOBENZAPRINE HCL 10 MG
5 TABLET ORAL ONCE
Status: COMPLETED | OUTPATIENT
Start: 2022-08-23 | End: 2022-08-23

## 2022-08-23 RX ADMIN — SODIUM BICARBONATE 650 MG TABLET 1300 MG: at 18:38

## 2022-08-23 RX ADMIN — INSULIN LISPRO 2 UNITS: 100 INJECTION, SOLUTION INTRAVENOUS; SUBCUTANEOUS at 08:17

## 2022-08-23 RX ADMIN — SODIUM CHLORIDE 125 MG: 9 INJECTION, SOLUTION INTRAVENOUS at 18:39

## 2022-08-23 RX ADMIN — ATORVASTATIN CALCIUM 20 MG: 20 TABLET, FILM COATED ORAL at 20:25

## 2022-08-23 RX ADMIN — SODIUM CHLORIDE 75 ML/HR: 9 INJECTION, SOLUTION INTRAVENOUS at 08:29

## 2022-08-23 RX ADMIN — ATENOLOL 100 MG: 50 TABLET ORAL at 08:17

## 2022-08-23 RX ADMIN — SODIUM CHLORIDE 75 ML/HR: 9 INJECTION, SOLUTION INTRAVENOUS at 22:13

## 2022-08-23 RX ADMIN — HYDROCODONE BITARTRATE AND ACETAMINOPHEN 1 TABLET: 5; 325 TABLET ORAL at 15:20

## 2022-08-23 RX ADMIN — HYDROCODONE BITARTRATE AND ACETAMINOPHEN 1 TABLET: 5; 325 TABLET ORAL at 22:12

## 2022-08-23 RX ADMIN — Medication 10 ML: at 08:17

## 2022-08-23 RX ADMIN — CYCLOBENZAPRINE 5 MG: 10 TABLET, FILM COATED ORAL at 22:27

## 2022-08-23 RX ADMIN — AMLODIPINE BESYLATE 10 MG: 5 TABLET ORAL at 08:17

## 2022-08-23 RX ADMIN — ASPIRIN 81 MG: 81 TABLET, COATED ORAL at 08:17

## 2022-08-23 RX ADMIN — HYDROCODONE BITARTRATE AND ACETAMINOPHEN 1 TABLET: 5; 325 TABLET ORAL at 08:17

## 2022-08-23 RX ADMIN — SODIUM BICARBONATE 650 MG TABLET 1300 MG: at 20:25

## 2022-08-23 RX ADMIN — Medication 10 ML: at 20:25

## 2022-08-23 RX ADMIN — INSULIN LISPRO 2 UNITS: 100 INJECTION, SOLUTION INTRAVENOUS; SUBCUTANEOUS at 18:38

## 2022-08-23 NOTE — ASSESSMENT & PLAN NOTE
Hypertension is worsening.    Discussed medication compliance prescription refilled amlodipine and atenolol.  Dietary sodium restriction.  Weight loss.  Regular aerobic exercise.  Blood pressure will be reassessed in 3 months.

## 2022-08-23 NOTE — ASSESSMENT & PLAN NOTE
Diabetes is worsening. Complications of  diabetes discussed with the patient in detail including micro-and macro vascular complications.  Advised to check blood sugar daily and follow lifestyle changes.    We will check fasting lipid panel, CMP, hemoglobin A1c and CBC.  Refill medication.  Dietary recommendations for ADA diet.  Regular aerobic exercise.  Discussed ways to avoid symptomatic hypoglycemia.  Discussed sick day management.  Discussed foot care.  Reminded to get yearly retinal exam.  Diabetes will be reassessed in 3 months.

## 2022-08-23 NOTE — ASSESSMENT & PLAN NOTE
Lipid abnormalities are worsening.  prescription sent atorvastatin  Nutritional counseling was provided.  Lipids will be reassessed in 3 months.

## 2022-08-24 ENCOUNTER — TELEPHONE (OUTPATIENT)
Dept: DIABETES SERVICES | Facility: HOSPITAL | Age: 77
End: 2022-08-24

## 2022-08-24 LAB
ALBUMIN SERPL ELPH-MCNC: 2.5 G/DL (ref 2.9–4.4)
ALBUMIN/GLOB SERPL: 0.7 {RATIO} (ref 0.7–1.7)
ALPHA1 GLOB SERPL ELPH-MCNC: 0.3 G/DL (ref 0–0.4)
ALPHA2 GLOB SERPL ELPH-MCNC: 1.2 G/DL (ref 0.4–1)
ANA SER QL: NEGATIVE
ANION GAP SERPL CALCULATED.3IONS-SCNC: 12 MMOL/L (ref 5–15)
ANION GAP SERPL CALCULATED.3IONS-SCNC: 12 MMOL/L (ref 5–15)
B-GLOBULIN SERPL ELPH-MCNC: 1.1 G/DL (ref 0.7–1.3)
BACTERIA SPEC AEROBE CULT: NO GROWTH
BASOPHILS # BLD AUTO: 0 10*3/MM3 (ref 0–0.2)
BASOPHILS # BLD AUTO: 0 10*3/MM3 (ref 0–0.2)
BASOPHILS NFR BLD AUTO: 0.2 % (ref 0–1.5)
BASOPHILS NFR BLD AUTO: 0.4 % (ref 0–1.5)
BUN SERPL-MCNC: 45 MG/DL (ref 8–23)
BUN SERPL-MCNC: 48 MG/DL (ref 8–23)
BUN/CREAT SERPL: 12.1 (ref 7–25)
BUN/CREAT SERPL: 12.6 (ref 7–25)
CALCIUM SPEC-SCNC: 8.4 MG/DL (ref 8.6–10.5)
CALCIUM SPEC-SCNC: 8.5 MG/DL (ref 8.6–10.5)
CHLORIDE SERPL-SCNC: 110 MMOL/L (ref 98–107)
CHLORIDE SERPL-SCNC: 111 MMOL/L (ref 98–107)
CO2 SERPL-SCNC: 17 MMOL/L (ref 22–29)
CO2 SERPL-SCNC: 18 MMOL/L (ref 22–29)
CREAT SERPL-MCNC: 3.72 MG/DL (ref 0.57–1)
CREAT SERPL-MCNC: 3.81 MG/DL (ref 0.57–1)
CREAT UR-MCNC: 84.7 MG/DL
DEPRECATED RDW RBC AUTO: 43.8 FL (ref 37–54)
DEPRECATED RDW RBC AUTO: 47.7 FL (ref 37–54)
EGFRCR SERPLBLD CKD-EPI 2021: 11.8 ML/MIN/1.73
EGFRCR SERPLBLD CKD-EPI 2021: 12.2 ML/MIN/1.73
EOSINOPHIL # BLD AUTO: 0.4 10*3/MM3 (ref 0–0.4)
EOSINOPHIL # BLD AUTO: 0.4 10*3/MM3 (ref 0–0.4)
EOSINOPHIL NFR BLD AUTO: 4.2 % (ref 0.3–6.2)
EOSINOPHIL NFR BLD AUTO: 5.4 % (ref 0.3–6.2)
ERYTHROCYTE [DISTWIDTH] IN BLOOD BY AUTOMATED COUNT: 14.5 % (ref 12.3–15.4)
ERYTHROCYTE [DISTWIDTH] IN BLOOD BY AUTOMATED COUNT: 15 % (ref 12.3–15.4)
FERRITIN SERPL-MCNC: 161.6 NG/ML (ref 13–150)
GAMMA GLOB SERPL ELPH-MCNC: 1.3 G/DL (ref 0.4–1.8)
GLOBULIN SER-MCNC: 3.9 G/DL (ref 2.2–3.9)
GLUCOSE BLDC GLUCOMTR-MCNC: 125 MG/DL (ref 70–105)
GLUCOSE BLDC GLUCOMTR-MCNC: 141 MG/DL (ref 70–105)
GLUCOSE BLDC GLUCOMTR-MCNC: 151 MG/DL (ref 70–105)
GLUCOSE SERPL-MCNC: 134 MG/DL (ref 65–99)
GLUCOSE SERPL-MCNC: 144 MG/DL (ref 65–99)
HBA1C MFR BLD: 7 % (ref 3.5–5.6)
HCT VFR BLD AUTO: 28.7 % (ref 34–46.6)
HCT VFR BLD AUTO: 30.4 % (ref 34–46.6)
HGB BLD-MCNC: 8.9 G/DL (ref 12–15.9)
HGB BLD-MCNC: 9 G/DL (ref 12–15.9)
IGA SERPL-MCNC: 425 MG/DL (ref 64–422)
IGG SERPL-MCNC: 1439 MG/DL (ref 586–1602)
IGM SERPL-MCNC: 73 MG/DL (ref 26–217)
INTERPRETATION SERPL IEP-IMP: ABNORMAL
LABORATORY COMMENT REPORT: ABNORMAL
LYMPHOCYTES # BLD AUTO: 1.2 10*3/MM3 (ref 0.7–3.1)
LYMPHOCYTES # BLD AUTO: 1.3 10*3/MM3 (ref 0.7–3.1)
LYMPHOCYTES NFR BLD AUTO: 15.7 % (ref 19.6–45.3)
LYMPHOCYTES NFR BLD AUTO: 16.2 % (ref 19.6–45.3)
M PROTEIN SERPL ELPH-MCNC: ABNORMAL G/DL
MCH RBC QN AUTO: 26.1 PG (ref 26.6–33)
MCH RBC QN AUTO: 26.3 PG (ref 26.6–33)
MCHC RBC AUTO-ENTMCNC: 29.5 G/DL (ref 31.5–35.7)
MCHC RBC AUTO-ENTMCNC: 31.2 G/DL (ref 31.5–35.7)
MCV RBC AUTO: 84.4 FL (ref 79–97)
MCV RBC AUTO: 88.5 FL (ref 79–97)
MONOCYTES # BLD AUTO: 0.8 10*3/MM3 (ref 0.1–0.9)
MONOCYTES # BLD AUTO: 1 10*3/MM3 (ref 0.1–0.9)
MONOCYTES NFR BLD AUTO: 10.6 % (ref 5–12)
MONOCYTES NFR BLD AUTO: 11.3 % (ref 5–12)
NEUTROPHILS NFR BLD AUTO: 5.1 10*3/MM3 (ref 1.7–7)
NEUTROPHILS NFR BLD AUTO: 5.9 10*3/MM3 (ref 1.7–7)
NEUTROPHILS NFR BLD AUTO: 67.4 % (ref 42.7–76)
NEUTROPHILS NFR BLD AUTO: 68.6 % (ref 42.7–76)
NRBC BLD AUTO-RTO: 0 /100 WBC (ref 0–0.2)
NRBC BLD AUTO-RTO: 0.1 /100 WBC (ref 0–0.2)
PLATELET # BLD AUTO: 391 10*3/MM3 (ref 140–450)
PLATELET # BLD AUTO: 404 10*3/MM3 (ref 140–450)
PMV BLD AUTO: 8.4 FL (ref 6–12)
PMV BLD AUTO: 8.5 FL (ref 6–12)
POTASSIUM SERPL-SCNC: 4.2 MMOL/L (ref 3.5–5.2)
POTASSIUM SERPL-SCNC: 4.3 MMOL/L (ref 3.5–5.2)
PROT SERPL-MCNC: 6.4 G/DL (ref 6–8.5)
RBC # BLD AUTO: 3.4 10*6/MM3 (ref 3.77–5.28)
RBC # BLD AUTO: 3.44 10*6/MM3 (ref 3.77–5.28)
SODIUM SERPL-SCNC: 140 MMOL/L (ref 136–145)
SODIUM SERPL-SCNC: 140 MMOL/L (ref 136–145)
WBC NRBC COR # BLD: 7.6 10*3/MM3 (ref 3.4–10.8)
WBC NRBC COR # BLD: 8.5 10*3/MM3 (ref 3.4–10.8)

## 2022-08-24 PROCEDURE — 82728 ASSAY OF FERRITIN: CPT | Performed by: INTERNAL MEDICINE

## 2022-08-24 PROCEDURE — 82962 GLUCOSE BLOOD TEST: CPT

## 2022-08-24 PROCEDURE — 83036 HEMOGLOBIN GLYCOSYLATED A1C: CPT | Performed by: INTERNAL MEDICINE

## 2022-08-24 PROCEDURE — 99232 SBSQ HOSP IP/OBS MODERATE 35: CPT | Performed by: INTERNAL MEDICINE

## 2022-08-24 PROCEDURE — 85025 COMPLETE CBC W/AUTO DIFF WBC: CPT | Performed by: PHYSICIAN ASSISTANT

## 2022-08-24 PROCEDURE — 25010000002 NA FERRIC GLUC CPLX PER 12.5 MG: Performed by: INTERNAL MEDICINE

## 2022-08-24 PROCEDURE — 63710000001 INSULIN LISPRO (HUMAN) PER 5 UNITS: Performed by: PHYSICIAN ASSISTANT

## 2022-08-24 PROCEDURE — 80048 BASIC METABOLIC PNL TOTAL CA: CPT | Performed by: PHYSICIAN ASSISTANT

## 2022-08-24 PROCEDURE — G0378 HOSPITAL OBSERVATION PER HR: HCPCS

## 2022-08-24 PROCEDURE — 97162 PT EVAL MOD COMPLEX 30 MIN: CPT | Performed by: PHYSICAL THERAPIST

## 2022-08-24 RX ORDER — FUROSEMIDE 40 MG/1
40 TABLET ORAL DAILY
Status: DISCONTINUED | OUTPATIENT
Start: 2022-08-24 | End: 2022-08-26

## 2022-08-24 RX ORDER — BACLOFEN 10 MG/1
5 TABLET ORAL EVERY 12 HOURS SCHEDULED
Status: DISCONTINUED | OUTPATIENT
Start: 2022-08-24 | End: 2022-09-03 | Stop reason: HOSPADM

## 2022-08-24 RX ADMIN — ATORVASTATIN CALCIUM 20 MG: 20 TABLET, FILM COATED ORAL at 20:42

## 2022-08-24 RX ADMIN — FUROSEMIDE 40 MG: 40 TABLET ORAL at 15:19

## 2022-08-24 RX ADMIN — Medication 10 ML: at 09:11

## 2022-08-24 RX ADMIN — HYDROCODONE BITARTRATE AND ACETAMINOPHEN 1 TABLET: 5; 325 TABLET ORAL at 06:19

## 2022-08-24 RX ADMIN — SODIUM BICARBONATE 650 MG TABLET 1300 MG: at 09:11

## 2022-08-24 RX ADMIN — AMLODIPINE BESYLATE 10 MG: 5 TABLET ORAL at 09:11

## 2022-08-24 RX ADMIN — Medication 10 ML: at 20:43

## 2022-08-24 RX ADMIN — INSULIN LISPRO 2 UNITS: 100 INJECTION, SOLUTION INTRAVENOUS; SUBCUTANEOUS at 18:08

## 2022-08-24 RX ADMIN — SODIUM BICARBONATE 650 MG TABLET 1300 MG: at 20:42

## 2022-08-24 RX ADMIN — SODIUM CHLORIDE 125 MG: 9 INJECTION, SOLUTION INTRAVENOUS at 09:11

## 2022-08-24 RX ADMIN — SODIUM CHLORIDE 75 ML/HR: 9 INJECTION, SOLUTION INTRAVENOUS at 09:11

## 2022-08-24 RX ADMIN — LIDOCAINE 1 PATCH: 50 PATCH CUTANEOUS at 15:19

## 2022-08-24 RX ADMIN — ATENOLOL 100 MG: 50 TABLET ORAL at 09:11

## 2022-08-24 RX ADMIN — SODIUM BICARBONATE 650 MG TABLET 1300 MG: at 15:19

## 2022-08-24 RX ADMIN — BACLOFEN 5 MG: 10 TABLET ORAL at 11:09

## 2022-08-24 RX ADMIN — HYDROCODONE BITARTRATE AND ACETAMINOPHEN 1 TABLET: 5; 325 TABLET ORAL at 18:10

## 2022-08-24 RX ADMIN — BACLOFEN 5 MG: 10 TABLET ORAL at 20:42

## 2022-08-25 ENCOUNTER — APPOINTMENT (OUTPATIENT)
Dept: MRI IMAGING | Facility: HOSPITAL | Age: 77
End: 2022-08-25

## 2022-08-25 PROBLEM — N18.9 ACUTE RENAL FAILURE SUPERIMPOSED ON CHRONIC KIDNEY DISEASE, UNSPECIFIED CKD STAGE, UNSPECIFIED ACUTE RENAL FAILURE TYPE: Status: ACTIVE | Noted: 2022-08-25

## 2022-08-25 PROBLEM — N17.9 ACUTE RENAL FAILURE SUPERIMPOSED ON CHRONIC KIDNEY DISEASE, UNSPECIFIED CKD STAGE, UNSPECIFIED ACUTE RENAL FAILURE TYPE (HCC): Status: ACTIVE | Noted: 2022-08-25

## 2022-08-25 LAB
BACTERIA UR QL AUTO: ABNORMAL /HPF
BILIRUB UR QL STRIP: NEGATIVE
C-ANCA TITR SER IF: NORMAL TITER
CLARITY UR: ABNORMAL
COLOR UR: YELLOW
GLUCOSE BLDC GLUCOMTR-MCNC: 123 MG/DL (ref 70–105)
GLUCOSE BLDC GLUCOMTR-MCNC: 146 MG/DL (ref 70–105)
GLUCOSE BLDC GLUCOMTR-MCNC: 151 MG/DL (ref 70–105)
GLUCOSE BLDC GLUCOMTR-MCNC: 187 MG/DL (ref 70–105)
GLUCOSE UR STRIP-MCNC: ABNORMAL MG/DL
HGB UR QL STRIP.AUTO: ABNORMAL
HYALINE CASTS UR QL AUTO: ABNORMAL /LPF
KAPPA LC FREE SER-MCNC: 161.6 MG/L (ref 3.3–19.4)
KAPPA LC FREE/LAMBDA FREE SER: 1.16 {RATIO} (ref 0.26–1.65)
KETONES UR QL STRIP: NEGATIVE
LAMBDA LC FREE SERPL-MCNC: 139.5 MG/L (ref 5.7–26.3)
LEUKOCYTE ESTERASE UR QL STRIP.AUTO: ABNORMAL
MYELOPEROXIDASE AB SER IA-ACNC: <0.2 UNITS (ref 0–0.9)
NITRITE UR QL STRIP: NEGATIVE
NT-PROBNP SERPL-MCNC: ABNORMAL PG/ML (ref 0–1800)
P-ANCA ATYPICAL TITR SER IF: NORMAL TITER
P-ANCA TITR SER IF: NORMAL TITER
PH UR STRIP.AUTO: 6 [PH] (ref 5–8)
PROT UR QL STRIP: ABNORMAL
PROTEINASE3 AB SER IA-ACNC: <0.2 UNITS (ref 0–0.9)
RBC # UR STRIP: ABNORMAL /HPF
REF LAB TEST METHOD: ABNORMAL
SP GR UR STRIP: 1.01 (ref 1–1.03)
SQUAMOUS #/AREA URNS HPF: ABNORMAL /HPF
UROBILINOGEN UR QL STRIP: ABNORMAL
WBC # UR STRIP: ABNORMAL /HPF
WHOLE BLOOD HOLD SPECIMEN: NORMAL

## 2022-08-25 PROCEDURE — 99232 SBSQ HOSP IP/OBS MODERATE 35: CPT | Performed by: INTERNAL MEDICINE

## 2022-08-25 PROCEDURE — C1751 CATH, INF, PER/CENT/MIDLINE: HCPCS

## 2022-08-25 PROCEDURE — 82962 GLUCOSE BLOOD TEST: CPT

## 2022-08-25 PROCEDURE — 70540 MRI ORBIT/FACE/NECK W/O DYE: CPT

## 2022-08-25 PROCEDURE — 87086 URINE CULTURE/COLONY COUNT: CPT | Performed by: INTERNAL MEDICINE

## 2022-08-25 PROCEDURE — 83880 ASSAY OF NATRIURETIC PEPTIDE: CPT | Performed by: INTERNAL MEDICINE

## 2022-08-25 PROCEDURE — 25010000002 NA FERRIC GLUC CPLX PER 12.5 MG: Performed by: INTERNAL MEDICINE

## 2022-08-25 PROCEDURE — 63710000001 INSULIN LISPRO (HUMAN) PER 5 UNITS: Performed by: PHYSICIAN ASSISTANT

## 2022-08-25 PROCEDURE — 81001 URINALYSIS AUTO W/SCOPE: CPT | Performed by: INTERNAL MEDICINE

## 2022-08-25 RX ORDER — IPRATROPIUM BROMIDE AND ALBUTEROL SULFATE 2.5; .5 MG/3ML; MG/3ML
3 SOLUTION RESPIRATORY (INHALATION) EVERY 4 HOURS PRN
Status: DISCONTINUED | OUTPATIENT
Start: 2022-08-25 | End: 2022-09-03 | Stop reason: HOSPADM

## 2022-08-25 RX ADMIN — BACLOFEN 5 MG: 10 TABLET ORAL at 20:00

## 2022-08-25 RX ADMIN — LIDOCAINE 1 PATCH: 50 PATCH CUTANEOUS at 09:39

## 2022-08-25 RX ADMIN — SODIUM BICARBONATE 650 MG TABLET 1300 MG: at 09:39

## 2022-08-25 RX ADMIN — BACLOFEN 5 MG: 10 TABLET ORAL at 09:39

## 2022-08-25 RX ADMIN — SODIUM CHLORIDE 125 MG: 9 INJECTION, SOLUTION INTRAVENOUS at 12:47

## 2022-08-25 RX ADMIN — FUROSEMIDE 40 MG: 40 TABLET ORAL at 09:39

## 2022-08-25 RX ADMIN — ATENOLOL 100 MG: 50 TABLET ORAL at 09:39

## 2022-08-25 RX ADMIN — SODIUM BICARBONATE 650 MG TABLET 1300 MG: at 20:00

## 2022-08-25 RX ADMIN — HYDROCODONE BITARTRATE AND ACETAMINOPHEN 1 TABLET: 5; 325 TABLET ORAL at 12:43

## 2022-08-25 RX ADMIN — HYDROCODONE BITARTRATE AND ACETAMINOPHEN 1 TABLET: 5; 325 TABLET ORAL at 02:07

## 2022-08-25 RX ADMIN — ATORVASTATIN CALCIUM 20 MG: 20 TABLET, FILM COATED ORAL at 20:00

## 2022-08-25 RX ADMIN — SODIUM BICARBONATE 650 MG TABLET 1300 MG: at 16:03

## 2022-08-25 RX ADMIN — Medication 10 ML: at 20:00

## 2022-08-25 RX ADMIN — Medication 10 ML: at 09:39

## 2022-08-25 RX ADMIN — HYDROCODONE BITARTRATE AND ACETAMINOPHEN 1 TABLET: 5; 325 TABLET ORAL at 18:34

## 2022-08-25 RX ADMIN — INSULIN LISPRO 2 UNITS: 100 INJECTION, SOLUTION INTRAVENOUS; SUBCUTANEOUS at 18:50

## 2022-08-25 RX ADMIN — AMLODIPINE BESYLATE 10 MG: 5 TABLET ORAL at 09:39

## 2022-08-26 ENCOUNTER — APPOINTMENT (OUTPATIENT)
Dept: CARDIOLOGY | Facility: HOSPITAL | Age: 77
End: 2022-08-26

## 2022-08-26 ENCOUNTER — APPOINTMENT (OUTPATIENT)
Dept: GENERAL RADIOLOGY | Facility: HOSPITAL | Age: 77
End: 2022-08-26

## 2022-08-26 ENCOUNTER — APPOINTMENT (OUTPATIENT)
Dept: CT IMAGING | Facility: HOSPITAL | Age: 77
End: 2022-08-26

## 2022-08-26 ENCOUNTER — TELEPHONE (OUTPATIENT)
Dept: FAMILY MEDICINE CLINIC | Facility: CLINIC | Age: 77
End: 2022-08-26

## 2022-08-26 LAB
ALBUMIN SERPL-MCNC: 2.8 G/DL (ref 3.5–5.2)
ALBUMIN/GLOB SERPL: 0.7 G/DL
ALP SERPL-CCNC: 75 U/L (ref 39–117)
ALT SERPL W P-5'-P-CCNC: 5 U/L (ref 1–33)
ANION GAP SERPL CALCULATED.3IONS-SCNC: 14 MMOL/L (ref 5–15)
ANION GAP SERPL CALCULATED.3IONS-SCNC: 14 MMOL/L (ref 5–15)
ANION GAP SERPL CALCULATED.3IONS-SCNC: 15 MMOL/L (ref 5–15)
ARTERIAL PATENCY WRIST A: POSITIVE
ARTERIAL PATENCY WRIST A: POSITIVE
AST SERPL-CCNC: 17 U/L (ref 1–32)
ATMOSPHERIC PRESS: ABNORMAL MM[HG]
ATMOSPHERIC PRESS: ABNORMAL MM[HG]
BACTERIA SPEC AEROBE CULT: NO GROWTH
BASE EXCESS BLDA CALC-SCNC: -2.6 MMOL/L (ref 0–3)
BASE EXCESS BLDA CALC-SCNC: -3.6 MMOL/L (ref 0–3)
BASOPHILS # BLD AUTO: 0 10*3/MM3 (ref 0–0.2)
BASOPHILS # BLD AUTO: 0 10*3/MM3 (ref 0–0.2)
BASOPHILS NFR BLD AUTO: 0.3 % (ref 0–1.5)
BASOPHILS NFR BLD AUTO: 0.3 % (ref 0–1.5)
BDY SITE: ABNORMAL
BDY SITE: ABNORMAL
BH CV ECHO MEAS - ACS: 2.36 CM
BH CV ECHO MEAS - AO MAX PG: 7.4 MMHG
BH CV ECHO MEAS - AO MEAN PG: 3.9 MMHG
BH CV ECHO MEAS - AO ROOT DIAM: 3.2 CM
BH CV ECHO MEAS - AO V2 MAX: 135.7 CM/SEC
BH CV ECHO MEAS - AO V2 VTI: 37.1 CM
BH CV ECHO MEAS - AVA(I,D): 1.45 CM2
BH CV ECHO MEAS - EDV(CUBED): 100.5 ML
BH CV ECHO MEAS - EDV(MOD-SP4): 64.1 ML
BH CV ECHO MEAS - EF(MOD-BP): 33 %
BH CV ECHO MEAS - EF(MOD-SP4): 33.4 %
BH CV ECHO MEAS - ESV(CUBED): 61.5 ML
BH CV ECHO MEAS - ESV(MOD-SP4): 42.6 ML
BH CV ECHO MEAS - FS: 15.1 %
BH CV ECHO MEAS - IVS/LVPW: 0.87 CM
BH CV ECHO MEAS - IVSD: 1.43 CM
BH CV ECHO MEAS - LA DIMENSION: 5.1 CM
BH CV ECHO MEAS - LV DIASTOLIC VOL/BSA (35-75): 37.2 CM2
BH CV ECHO MEAS - LV MASS(C)D: 301.1 GRAMS
BH CV ECHO MEAS - LV MAX PG: 1.19 MMHG
BH CV ECHO MEAS - LV MEAN PG: 0.59 MMHG
BH CV ECHO MEAS - LV SYSTOLIC VOL/BSA (12-30): 24.8 CM2
BH CV ECHO MEAS - LV V1 MAX: 54.5 CM/SEC
BH CV ECHO MEAS - LV V1 VTI: 15.4 CM
BH CV ECHO MEAS - LVIDD: 4.6 CM
BH CV ECHO MEAS - LVIDS: 3.9 CM
BH CV ECHO MEAS - LVOT AREA: 3.5 CM2
BH CV ECHO MEAS - LVOT DIAM: 2.11 CM
BH CV ECHO MEAS - LVPWD: 1.65 CM
BH CV ECHO MEAS - MR MAX PG: 39.5 MMHG
BH CV ECHO MEAS - MR MAX VEL: 310 CM/SEC
BH CV ECHO MEAS - MV A MAX VEL: 40.5 CM/SEC
BH CV ECHO MEAS - MV DEC SLOPE: 258.3 CM/SEC2
BH CV ECHO MEAS - MV DEC TIME: 0.35 MSEC
BH CV ECHO MEAS - MV E MAX VEL: 91.2 CM/SEC
BH CV ECHO MEAS - MV E/A: 2.25
BH CV ECHO MEAS - MV MAX PG: 6.1 MMHG
BH CV ECHO MEAS - MV MEAN PG: 2.1 MMHG
BH CV ECHO MEAS - MV V2 VTI: 39.5 CM
BH CV ECHO MEAS - MVA(VTI): 1.36 CM2
BH CV ECHO MEAS - PA V2 MAX: 89.1 CM/SEC
BH CV ECHO MEAS - PI END-D VEL: 141.9 CM/SEC
BH CV ECHO MEAS - PULM DIAS VEL: 44.8 CM/SEC
BH CV ECHO MEAS - PULM S/D: 1.32
BH CV ECHO MEAS - PULM SYS VEL: 59.1 CM/SEC
BH CV ECHO MEAS - RAP SYSTOLE: 8 MMHG
BH CV ECHO MEAS - RV MAX PG: 0.74 MMHG
BH CV ECHO MEAS - RV V1 MAX: 42.9 CM/SEC
BH CV ECHO MEAS - RV V1 VTI: 12 CM
BH CV ECHO MEAS - RVDD: 3.1 CM
BH CV ECHO MEAS - RVSP: 35.9 MMHG
BH CV ECHO MEAS - SI(MOD-SP4): 12.5 ML/M2
BH CV ECHO MEAS - SV(LVOT): 53.8 ML
BH CV ECHO MEAS - SV(MOD-SP4): 21.4 ML
BH CV ECHO MEAS - TR MAX PG: 27.9 MMHG
BH CV ECHO MEAS - TR MAX VEL: 261.8 CM/SEC
BILIRUB SERPL-MCNC: 0.2 MG/DL (ref 0–1.2)
BUN SERPL-MCNC: 42 MG/DL (ref 8–23)
BUN SERPL-MCNC: 42 MG/DL (ref 8–23)
BUN SERPL-MCNC: 43 MG/DL (ref 8–23)
BUN/CREAT SERPL: 11.8 (ref 7–25)
BUN/CREAT SERPL: 12.2 (ref 7–25)
BUN/CREAT SERPL: 12.7 (ref 7–25)
CALCIUM SPEC-SCNC: 9.1 MG/DL (ref 8.6–10.5)
CALCIUM SPEC-SCNC: 9.1 MG/DL (ref 8.6–10.5)
CALCIUM SPEC-SCNC: 9.2 MG/DL (ref 8.6–10.5)
CHLORIDE SERPL-SCNC: 107 MMOL/L (ref 98–107)
CHLORIDE SERPL-SCNC: 108 MMOL/L (ref 98–107)
CHLORIDE SERPL-SCNC: 109 MMOL/L (ref 98–107)
CO2 BLDA-SCNC: 22.1 MMOL/L (ref 22–29)
CO2 BLDA-SCNC: 22.4 MMOL/L (ref 22–29)
CO2 SERPL-SCNC: 18 MMOL/L (ref 22–29)
CO2 SERPL-SCNC: 19 MMOL/L (ref 22–29)
CO2 SERPL-SCNC: 20 MMOL/L (ref 22–29)
CREAT SERPL-MCNC: 3.38 MG/DL (ref 0.57–1)
CREAT SERPL-MCNC: 3.43 MG/DL (ref 0.57–1)
CREAT SERPL-MCNC: 3.56 MG/DL (ref 0.57–1)
D-LACTATE SERPL-SCNC: 0.8 MMOL/L (ref 0.5–2)
DEPRECATED RDW RBC AUTO: 44.2 FL (ref 37–54)
DEPRECATED RDW RBC AUTO: 44.2 FL (ref 37–54)
EGFRCR SERPLBLD CKD-EPI 2021: 12.8 ML/MIN/1.73
EGFRCR SERPLBLD CKD-EPI 2021: 13.4 ML/MIN/1.73
EGFRCR SERPLBLD CKD-EPI 2021: 13.7 ML/MIN/1.73
EOSINOPHIL # BLD AUTO: 0.2 10*3/MM3 (ref 0–0.4)
EOSINOPHIL # BLD AUTO: 0.3 10*3/MM3 (ref 0–0.4)
EOSINOPHIL NFR BLD AUTO: 1.5 % (ref 0.3–6.2)
EOSINOPHIL NFR BLD AUTO: 2.9 % (ref 0.3–6.2)
ERYTHROCYTE [DISTWIDTH] IN BLOOD BY AUTOMATED COUNT: 14.5 % (ref 12.3–15.4)
ERYTHROCYTE [DISTWIDTH] IN BLOOD BY AUTOMATED COUNT: 14.6 % (ref 12.3–15.4)
GLOBULIN UR ELPH-MCNC: 4.1 GM/DL
GLUCOSE BLDC GLUCOMTR-MCNC: 147 MG/DL (ref 70–105)
GLUCOSE BLDC GLUCOMTR-MCNC: 147 MG/DL (ref 70–105)
GLUCOSE BLDC GLUCOMTR-MCNC: 149 MG/DL (ref 70–105)
GLUCOSE BLDC GLUCOMTR-MCNC: 150 MG/DL (ref 70–105)
GLUCOSE SERPL-MCNC: 162 MG/DL (ref 65–99)
GLUCOSE SERPL-MCNC: 162 MG/DL (ref 65–99)
GLUCOSE SERPL-MCNC: 195 MG/DL (ref 65–99)
HCO3 BLDA-SCNC: 21 MMOL/L (ref 21–28)
HCO3 BLDA-SCNC: 21.4 MMOL/L (ref 21–28)
HCT VFR BLD AUTO: 32.6 % (ref 34–46.6)
HCT VFR BLD AUTO: 34.1 % (ref 34–46.6)
HEMODILUTION: NO
HEMODILUTION: NO
HGB BLD-MCNC: 10.1 G/DL (ref 12–15.9)
HGB BLD-MCNC: 10.7 G/DL (ref 12–15.9)
INHALED O2 CONCENTRATION: 21 %
INHALED O2 CONCENTRATION: 21 %
LYMPHOCYTES # BLD AUTO: 1.2 10*3/MM3 (ref 0.7–3.1)
LYMPHOCYTES # BLD AUTO: 1.2 10*3/MM3 (ref 0.7–3.1)
LYMPHOCYTES NFR BLD AUTO: 10.8 % (ref 19.6–45.3)
LYMPHOCYTES NFR BLD AUTO: 13.3 % (ref 19.6–45.3)
MAXIMAL PREDICTED HEART RATE: 145 BPM
MCH RBC QN AUTO: 26.1 PG (ref 26.6–33)
MCH RBC QN AUTO: 26.6 PG (ref 26.6–33)
MCHC RBC AUTO-ENTMCNC: 30.9 G/DL (ref 31.5–35.7)
MCHC RBC AUTO-ENTMCNC: 31.4 G/DL (ref 31.5–35.7)
MCV RBC AUTO: 84.5 FL (ref 79–97)
MCV RBC AUTO: 84.6 FL (ref 79–97)
MODALITY: ABNORMAL
MODALITY: ABNORMAL
MONOCYTES # BLD AUTO: 0.7 10*3/MM3 (ref 0.1–0.9)
MONOCYTES # BLD AUTO: 0.8 10*3/MM3 (ref 0.1–0.9)
MONOCYTES NFR BLD AUTO: 6.8 % (ref 5–12)
MONOCYTES NFR BLD AUTO: 9.1 % (ref 5–12)
NEUTROPHILS NFR BLD AUTO: 6.9 10*3/MM3 (ref 1.7–7)
NEUTROPHILS NFR BLD AUTO: 74.4 % (ref 42.7–76)
NEUTROPHILS NFR BLD AUTO: 8.6 10*3/MM3 (ref 1.7–7)
NEUTROPHILS NFR BLD AUTO: 80.6 % (ref 42.7–76)
NRBC BLD AUTO-RTO: 0.1 /100 WBC (ref 0–0.2)
NRBC BLD AUTO-RTO: 0.1 /100 WBC (ref 0–0.2)
PCO2 BLDA: 33.1 MM HG (ref 35–48)
PCO2 BLDA: 35.5 MM HG (ref 35–48)
PH BLDA: 7.38 PH UNITS (ref 7.35–7.45)
PH BLDA: 7.42 PH UNITS (ref 7.35–7.45)
PLATELET # BLD AUTO: 458 10*3/MM3 (ref 140–450)
PLATELET # BLD AUTO: 486 10*3/MM3 (ref 140–450)
PMV BLD AUTO: 8.1 FL (ref 6–12)
PMV BLD AUTO: 8.4 FL (ref 6–12)
PO2 BLDA: 80 MM HG (ref 83–108)
PO2 BLDA: 81.6 MM HG (ref 83–108)
POTASSIUM SERPL-SCNC: 4.2 MMOL/L (ref 3.5–5.2)
POTASSIUM SERPL-SCNC: 4.3 MMOL/L (ref 3.5–5.2)
POTASSIUM SERPL-SCNC: 4.4 MMOL/L (ref 3.5–5.2)
PROT SERPL-MCNC: 6.9 G/DL (ref 6–8.5)
RBC # BLD AUTO: 3.86 10*6/MM3 (ref 3.77–5.28)
RBC # BLD AUTO: 4.03 10*6/MM3 (ref 3.77–5.28)
SAO2 % BLDCOA: 95.6 % (ref 94–98)
SAO2 % BLDCOA: 96.3 % (ref 94–98)
SODIUM SERPL-SCNC: 140 MMOL/L (ref 136–145)
SODIUM SERPL-SCNC: 142 MMOL/L (ref 136–145)
SODIUM SERPL-SCNC: 142 MMOL/L (ref 136–145)
STRESS TARGET HR: 123 BPM
WBC NRBC COR # BLD: 10.7 10*3/MM3 (ref 3.4–10.8)
WBC NRBC COR # BLD: 9.3 10*3/MM3 (ref 3.4–10.8)
WHOLE BLOOD HOLD SPECIMEN: NORMAL

## 2022-08-26 PROCEDURE — 71045 X-RAY EXAM CHEST 1 VIEW: CPT

## 2022-08-26 PROCEDURE — 82962 GLUCOSE BLOOD TEST: CPT

## 2022-08-26 PROCEDURE — 93306 TTE W/DOPPLER COMPLETE: CPT

## 2022-08-26 PROCEDURE — 83605 ASSAY OF LACTIC ACID: CPT | Performed by: INTERNAL MEDICINE

## 2022-08-26 PROCEDURE — 99232 SBSQ HOSP IP/OBS MODERATE 35: CPT | Performed by: INTERNAL MEDICINE

## 2022-08-26 PROCEDURE — 70450 CT HEAD/BRAIN W/O DYE: CPT

## 2022-08-26 PROCEDURE — 84439 ASSAY OF FREE THYROXINE: CPT | Performed by: PSYCHIATRY & NEUROLOGY

## 2022-08-26 PROCEDURE — 25010000002 FUROSEMIDE PER 20 MG: Performed by: INTERNAL MEDICINE

## 2022-08-26 PROCEDURE — 80053 COMPREHEN METABOLIC PANEL: CPT | Performed by: PHYSICIAN ASSISTANT

## 2022-08-26 PROCEDURE — 36600 WITHDRAWAL OF ARTERIAL BLOOD: CPT

## 2022-08-26 PROCEDURE — 85025 COMPLETE CBC W/AUTO DIFF WBC: CPT | Performed by: PHYSICIAN ASSISTANT

## 2022-08-26 PROCEDURE — 25010000002 ONDANSETRON PER 1 MG: Performed by: EMERGENCY MEDICINE

## 2022-08-26 PROCEDURE — 84443 ASSAY THYROID STIM HORMONE: CPT | Performed by: PSYCHIATRY & NEUROLOGY

## 2022-08-26 PROCEDURE — 80061 LIPID PANEL: CPT | Performed by: PSYCHIATRY & NEUROLOGY

## 2022-08-26 PROCEDURE — 82803 BLOOD GASES ANY COMBINATION: CPT

## 2022-08-26 PROCEDURE — 93306 TTE W/DOPPLER COMPLETE: CPT | Performed by: INTERNAL MEDICINE

## 2022-08-26 PROCEDURE — 97530 THERAPEUTIC ACTIVITIES: CPT

## 2022-08-26 RX ORDER — FUROSEMIDE 10 MG/ML
80 INJECTION INTRAMUSCULAR; INTRAVENOUS
Status: DISCONTINUED | OUTPATIENT
Start: 2022-08-26 | End: 2022-08-29

## 2022-08-26 RX ORDER — FUROSEMIDE 10 MG/ML
40 INJECTION INTRAMUSCULAR; INTRAVENOUS EVERY 12 HOURS
Status: DISCONTINUED | OUTPATIENT
Start: 2022-08-26 | End: 2022-08-26

## 2022-08-26 RX ORDER — HYDRALAZINE HYDROCHLORIDE 20 MG/ML
10 INJECTION INTRAMUSCULAR; INTRAVENOUS ONCE
Status: COMPLETED | OUTPATIENT
Start: 2022-08-26 | End: 2022-08-27

## 2022-08-26 RX ORDER — LEVETIRACETAM 10 MG/ML
1000 INJECTION INTRAVASCULAR ONCE
Status: COMPLETED | OUTPATIENT
Start: 2022-08-26 | End: 2022-08-27

## 2022-08-26 RX ORDER — LEVETIRACETAM 5 MG/ML
500 INJECTION INTRAVASCULAR EVERY 12 HOURS SCHEDULED
Status: DISCONTINUED | OUTPATIENT
Start: 2022-08-27 | End: 2022-09-01

## 2022-08-26 RX ORDER — ATENOLOL 50 MG/1
50 TABLET ORAL DAILY
Status: DISCONTINUED | OUTPATIENT
Start: 2022-08-27 | End: 2022-09-03 | Stop reason: HOSPADM

## 2022-08-26 RX ORDER — AMOXICILLIN 250 MG
1 CAPSULE ORAL 2 TIMES DAILY
Status: DISCONTINUED | OUTPATIENT
Start: 2022-08-26 | End: 2022-09-03 | Stop reason: HOSPADM

## 2022-08-26 RX ORDER — FUROSEMIDE 40 MG/1
80 TABLET ORAL
Status: DISCONTINUED | OUTPATIENT
Start: 2022-08-26 | End: 2022-08-26

## 2022-08-26 RX ORDER — POLYETHYLENE GLYCOL 3350 17 G/17G
17 POWDER, FOR SOLUTION ORAL DAILY
Status: DISCONTINUED | OUTPATIENT
Start: 2022-08-26 | End: 2022-09-03 | Stop reason: HOSPADM

## 2022-08-26 RX ORDER — ECHINACEA PURPUREA EXTRACT 125 MG
1 TABLET ORAL AS NEEDED
Status: DISCONTINUED | OUTPATIENT
Start: 2022-08-26 | End: 2022-09-03 | Stop reason: HOSPADM

## 2022-08-26 RX ORDER — ASPIRIN 325 MG
325 TABLET ORAL DAILY
Status: DISCONTINUED | OUTPATIENT
Start: 2022-08-27 | End: 2022-08-26

## 2022-08-26 RX ORDER — FUROSEMIDE 40 MG/1
40 TABLET ORAL DAILY
Status: DISCONTINUED | OUTPATIENT
Start: 2022-08-27 | End: 2022-08-26

## 2022-08-26 RX ORDER — LEVETIRACETAM 500 MG/1
500 TABLET ORAL EVERY 12 HOURS SCHEDULED
Status: DISCONTINUED | OUTPATIENT
Start: 2022-08-27 | End: 2022-09-03 | Stop reason: HOSPADM

## 2022-08-26 RX ADMIN — Medication 10 ML: at 22:29

## 2022-08-26 RX ADMIN — Medication 10 ML: at 09:20

## 2022-08-26 RX ADMIN — ONDANSETRON 4 MG: 2 INJECTION INTRAMUSCULAR; INTRAVENOUS at 04:35

## 2022-08-26 RX ADMIN — ATENOLOL 100 MG: 50 TABLET ORAL at 09:20

## 2022-08-26 RX ADMIN — HYDROCODONE BITARTRATE AND ACETAMINOPHEN 1 TABLET: 5; 325 TABLET ORAL at 01:27

## 2022-08-26 RX ADMIN — LIDOCAINE 1 PATCH: 50 PATCH CUTANEOUS at 09:20

## 2022-08-26 RX ADMIN — FUROSEMIDE 80 MG: 10 INJECTION, SOLUTION INTRAMUSCULAR; INTRAVENOUS at 17:02

## 2022-08-26 RX ADMIN — BACLOFEN 5 MG: 10 TABLET ORAL at 09:21

## 2022-08-26 RX ADMIN — SENNOSIDES AND DOCUSATE SODIUM 1 TABLET: 50; 8.6 TABLET ORAL at 16:54

## 2022-08-26 RX ADMIN — ONDANSETRON 4 MG: 2 INJECTION INTRAMUSCULAR; INTRAVENOUS at 17:23

## 2022-08-26 RX ADMIN — AMLODIPINE BESYLATE 10 MG: 5 TABLET ORAL at 09:20

## 2022-08-26 RX ADMIN — SODIUM BICARBONATE 650 MG TABLET 1300 MG: at 09:20

## 2022-08-26 RX ADMIN — FUROSEMIDE 40 MG: 10 INJECTION, SOLUTION INTRAMUSCULAR; INTRAVENOUS at 09:20

## 2022-08-26 RX ADMIN — SODIUM BICARBONATE 650 MG TABLET 1300 MG: at 16:54

## 2022-08-26 NOTE — TELEPHONE ENCOUNTER
[facility] faxed over stating that they need orders for Skilled Nursing and PT. They have pending orders currently , but thinking they need a signature . Please advise.

## 2022-08-26 NOTE — TELEPHONE ENCOUNTER
I didn't receive any orders , it was just a form stating what they needed. They didn't send anything over that needed to be signed.

## 2022-08-27 ENCOUNTER — APPOINTMENT (OUTPATIENT)
Dept: MRI IMAGING | Facility: HOSPITAL | Age: 77
End: 2022-08-27

## 2022-08-27 ENCOUNTER — APPOINTMENT (OUTPATIENT)
Dept: CARDIOLOGY | Facility: HOSPITAL | Age: 77
End: 2022-08-27

## 2022-08-27 LAB
BH CV XLRA MEAS RIGHT DIST CCA EDV: -10.6 CM/SEC
BH CV XLRA MEAS RIGHT DIST CCA PSV: -65.2 CM/SEC
BH CV XLRA MEAS RIGHT DIST ICA EDV: -7.4 CM/SEC
BH CV XLRA MEAS RIGHT DIST ICA PSV: -52.1 CM/SEC
BH CV XLRA MEAS RIGHT ICA/CCA RATIO: 0.67
BH CV XLRA MEAS RIGHT PROX CCA EDV: 8.1 CM/SEC
BH CV XLRA MEAS RIGHT PROX CCA PSV: 77.7 CM/SEC
BH CV XLRA MEAS RIGHT PROX ECA PSV: -110 CM/SEC
BH CV XLRA MEAS RIGHT PROX ICA EDV: -6.9 CM/SEC
BH CV XLRA MEAS RIGHT PROX ICA PSV: -42.8 CM/SEC
CHOLEST SERPL-MCNC: 122 MG/DL (ref 0–200)
GLUCOSE BLDC GLUCOMTR-MCNC: 142 MG/DL (ref 70–105)
GLUCOSE BLDC GLUCOMTR-MCNC: 157 MG/DL (ref 70–105)
GLUCOSE BLDC GLUCOMTR-MCNC: 174 MG/DL (ref 70–105)
HDLC SERPL-MCNC: 46 MG/DL (ref 40–60)
LDLC SERPL CALC-MCNC: 57 MG/DL (ref 0–100)
LDLC/HDLC SERPL: 1.2 {RATIO}
MAXIMAL PREDICTED HEART RATE: 145 BPM
STRESS TARGET HR: 123 BPM
T4 FREE SERPL-MCNC: 1.82 NG/DL (ref 0.93–1.7)
TRIGL SERPL-MCNC: 104 MG/DL (ref 0–150)
TSH SERPL DL<=0.05 MIU/L-ACNC: 1.16 UIU/ML (ref 0.27–4.2)
VLDLC SERPL-MCNC: 19 MG/DL (ref 5–40)

## 2022-08-27 PROCEDURE — 82607 VITAMIN B-12: CPT | Performed by: PSYCHIATRY & NEUROLOGY

## 2022-08-27 PROCEDURE — 0 LEVETIRACETAM IN NACL 0.75% 1000 MG/100ML SOLUTION: Performed by: PSYCHIATRY & NEUROLOGY

## 2022-08-27 PROCEDURE — 82962 GLUCOSE BLOOD TEST: CPT

## 2022-08-27 PROCEDURE — 25010000002 LEVETIRACETAM IN NACL 0.82% 500 MG/100ML SOLUTION: Performed by: PSYCHIATRY & NEUROLOGY

## 2022-08-27 PROCEDURE — 99222 1ST HOSP IP/OBS MODERATE 55: CPT | Performed by: PSYCHIATRY & NEUROLOGY

## 2022-08-27 PROCEDURE — 63710000001 INSULIN LISPRO (HUMAN) PER 5 UNITS: Performed by: PHYSICIAN ASSISTANT

## 2022-08-27 PROCEDURE — 70551 MRI BRAIN STEM W/O DYE: CPT

## 2022-08-27 PROCEDURE — 93880 EXTRACRANIAL BILAT STUDY: CPT

## 2022-08-27 PROCEDURE — 92610 EVALUATE SWALLOWING FUNCTION: CPT

## 2022-08-27 PROCEDURE — 85025 COMPLETE CBC W/AUTO DIFF WBC: CPT | Performed by: PHYSICIAN ASSISTANT

## 2022-08-27 PROCEDURE — 70544 MR ANGIOGRAPHY HEAD W/O DYE: CPT

## 2022-08-27 PROCEDURE — 99232 SBSQ HOSP IP/OBS MODERATE 35: CPT | Performed by: INTERNAL MEDICINE

## 2022-08-27 PROCEDURE — 80048 BASIC METABOLIC PNL TOTAL CA: CPT | Performed by: PHYSICIAN ASSISTANT

## 2022-08-27 PROCEDURE — 82746 ASSAY OF FOLIC ACID SERUM: CPT | Performed by: PSYCHIATRY & NEUROLOGY

## 2022-08-27 PROCEDURE — 25010000002 FUROSEMIDE PER 20 MG: Performed by: INTERNAL MEDICINE

## 2022-08-27 PROCEDURE — 25010000002 HYDRALAZINE PER 20 MG: Performed by: HOSPITALIST

## 2022-08-27 RX ORDER — ATORVASTATIN CALCIUM 40 MG/1
40 TABLET, FILM COATED ORAL NIGHTLY
Status: DISCONTINUED | OUTPATIENT
Start: 2022-08-27 | End: 2022-09-03 | Stop reason: HOSPADM

## 2022-08-27 RX ORDER — ASPIRIN 325 MG
325 TABLET ORAL DAILY
Status: DISCONTINUED | OUTPATIENT
Start: 2022-08-27 | End: 2022-08-30

## 2022-08-27 RX ADMIN — ATENOLOL 50 MG: 50 TABLET ORAL at 12:16

## 2022-08-27 RX ADMIN — BACLOFEN 5 MG: 10 TABLET ORAL at 12:16

## 2022-08-27 RX ADMIN — LEVETIRACETAM 1000 MG: 10 INJECTION INTRAVENOUS at 00:08

## 2022-08-27 RX ADMIN — POLYETHYLENE GLYCOL 3350 17 G: 17 POWDER, FOR SOLUTION ORAL at 12:17

## 2022-08-27 RX ADMIN — Medication 5 MG: at 20:39

## 2022-08-27 RX ADMIN — FUROSEMIDE 80 MG: 10 INJECTION, SOLUTION INTRAMUSCULAR; INTRAVENOUS at 12:15

## 2022-08-27 RX ADMIN — SODIUM BICARBONATE 650 MG TABLET 1300 MG: at 18:04

## 2022-08-27 RX ADMIN — Medication 10 ML: at 12:50

## 2022-08-27 RX ADMIN — SENNOSIDES AND DOCUSATE SODIUM 1 TABLET: 50; 8.6 TABLET ORAL at 12:17

## 2022-08-27 RX ADMIN — ACETAMINOPHEN 650 MG: 325 TABLET, FILM COATED ORAL at 12:22

## 2022-08-27 RX ADMIN — LEVETIRACETAM 500 MG: 500 TABLET ORAL at 20:39

## 2022-08-27 RX ADMIN — SODIUM BICARBONATE 650 MG TABLET 1300 MG: at 20:39

## 2022-08-27 RX ADMIN — BACLOFEN 5 MG: 10 TABLET ORAL at 20:39

## 2022-08-27 RX ADMIN — LEVETIRACETAM 500 MG: 5 INJECTION INTRAVENOUS at 10:41

## 2022-08-27 RX ADMIN — INSULIN LISPRO 2 UNITS: 100 INJECTION, SOLUTION INTRAVENOUS; SUBCUTANEOUS at 17:30

## 2022-08-27 RX ADMIN — SODIUM BICARBONATE 650 MG TABLET 1300 MG: at 12:16

## 2022-08-27 RX ADMIN — ATORVASTATIN CALCIUM 40 MG: 40 TABLET, FILM COATED ORAL at 20:39

## 2022-08-27 RX ADMIN — AMLODIPINE BESYLATE 10 MG: 5 TABLET ORAL at 12:17

## 2022-08-27 RX ADMIN — HYDRALAZINE HYDROCHLORIDE 10 MG: 20 INJECTION, SOLUTION INTRAMUSCULAR; INTRAVENOUS at 00:17

## 2022-08-27 RX ADMIN — HYDROCODONE BITARTRATE AND ACETAMINOPHEN 1 TABLET: 5; 325 TABLET ORAL at 20:39

## 2022-08-27 RX ADMIN — Medication 10 ML: at 20:40

## 2022-08-27 RX ADMIN — ASPIRIN 325 MG ORAL TABLET 325 MG: 325 PILL ORAL at 14:30

## 2022-08-27 RX ADMIN — FUROSEMIDE 80 MG: 10 INJECTION, SOLUTION INTRAMUSCULAR; INTRAVENOUS at 18:05

## 2022-08-27 RX ADMIN — LIDOCAINE 1 PATCH: 50 PATCH CUTANEOUS at 09:00

## 2022-08-27 RX ADMIN — SENNOSIDES AND DOCUSATE SODIUM 1 TABLET: 50; 8.6 TABLET ORAL at 20:39

## 2022-08-28 ENCOUNTER — APPOINTMENT (OUTPATIENT)
Dept: CARDIOLOGY | Facility: HOSPITAL | Age: 77
End: 2022-08-28

## 2022-08-28 LAB
ANION GAP SERPL CALCULATED.3IONS-SCNC: 14 MMOL/L (ref 5–15)
BACTERIA UR QL AUTO: ABNORMAL /HPF
BASOPHILS # BLD AUTO: 0.1 10*3/MM3 (ref 0–0.2)
BASOPHILS NFR BLD AUTO: 0.6 % (ref 0–1.5)
BH CV ECHO MEAS - ACS: 5.6 CM
BH CV ECHO MEAS - AO MAX PG: 7.5 MMHG
BH CV ECHO MEAS - AO MEAN PG: 4.7 MMHG
BH CV ECHO MEAS - AO ROOT DIAM: 3.3 CM
BH CV ECHO MEAS - AO V2 MAX: 137.1 CM/SEC
BH CV ECHO MEAS - AO V2 VTI: 34 CM
BH CV ECHO MEAS - AVA(I,D): 2.27 CM2
BH CV ECHO MEAS - EDV(CUBED): 88.7 ML
BH CV ECHO MEAS - EDV(MOD-SP4): 93.8 ML
BH CV ECHO MEAS - EF(MOD-BP): 55 %
BH CV ECHO MEAS - EF(MOD-SP4): 51 %
BH CV ECHO MEAS - ESV(CUBED): 42 ML
BH CV ECHO MEAS - ESV(MOD-SP4): 46 ML
BH CV ECHO MEAS - FS: 22.1 %
BH CV ECHO MEAS - IVS/LVPW: 0.55 CM
BH CV ECHO MEAS - IVSD: 1.08 CM
BH CV ECHO MEAS - LA DIMENSION: 2.3 CM
BH CV ECHO MEAS - LV MASS(C)D: 278.6 GRAMS
BH CV ECHO MEAS - LV MAX PG: 5.4 MMHG
BH CV ECHO MEAS - LV MEAN PG: 3 MMHG
BH CV ECHO MEAS - LV V1 MAX: 116.1 CM/SEC
BH CV ECHO MEAS - LV V1 VTI: 25.1 CM
BH CV ECHO MEAS - LVIDD: 4.5 CM
BH CV ECHO MEAS - LVIDS: 3.5 CM
BH CV ECHO MEAS - LVOT AREA: 3.1 CM2
BH CV ECHO MEAS - LVOT DIAM: 1.98 CM
BH CV ECHO MEAS - LVPWD: 1.96 CM
BH CV ECHO MEAS - MV A MAX VEL: 62.6 CM/SEC
BH CV ECHO MEAS - MV DEC SLOPE: 877.7 CM/SEC2
BH CV ECHO MEAS - MV DEC TIME: 0.16 MSEC
BH CV ECHO MEAS - MV E MAX VEL: 138.3 CM/SEC
BH CV ECHO MEAS - MV E/A: 2.21
BH CV ECHO MEAS - MV MAX PG: 6.8 MMHG
BH CV ECHO MEAS - MV MEAN PG: 2.16 MMHG
BH CV ECHO MEAS - MV V2 VTI: 39.7 CM
BH CV ECHO MEAS - MVA(VTI): 1.94 CM2
BH CV ECHO MEAS - RAP SYSTOLE: 3 MMHG
BH CV ECHO MEAS - RVDD: 3.4 CM
BH CV ECHO MEAS - RVSP: 19.1 MMHG
BH CV ECHO MEAS - SV(LVOT): 77.2 ML
BH CV ECHO MEAS - SV(MOD-SP4): 47.8 ML
BH CV ECHO MEAS - TR MAX PG: 16.1 MMHG
BH CV ECHO MEAS - TR MAX VEL: 200.9 CM/SEC
BILIRUB UR QL STRIP: NEGATIVE
BUN SERPL-MCNC: 42 MG/DL (ref 8–23)
BUN/CREAT SERPL: 12.5 (ref 7–25)
CALCIUM SPEC-SCNC: 8.9 MG/DL (ref 8.6–10.5)
CHLORIDE SERPL-SCNC: 107 MMOL/L (ref 98–107)
CLARITY UR: ABNORMAL
CO2 SERPL-SCNC: 23 MMOL/L (ref 22–29)
COLOR UR: YELLOW
CREAT SERPL-MCNC: 3.37 MG/DL (ref 0.57–1)
CREAT UR-MCNC: 40.2 MG/DL
DEPRECATED RDW RBC AUTO: 42.9 FL (ref 37–54)
EGFRCR SERPLBLD CKD-EPI 2021: 13.7 ML/MIN/1.73
EOSINOPHIL # BLD AUTO: 0.2 10*3/MM3 (ref 0–0.4)
EOSINOPHIL NFR BLD AUTO: 2.5 % (ref 0.3–6.2)
EOSINOPHIL SPEC QL MICRO: 0 % EOS/100 CELLS (ref 0–0)
ERYTHROCYTE [DISTWIDTH] IN BLOOD BY AUTOMATED COUNT: 14.4 % (ref 12.3–15.4)
FOLATE SERPL-MCNC: 5.59 NG/ML (ref 4.78–24.2)
GLUCOSE BLDC GLUCOMTR-MCNC: 152 MG/DL (ref 70–105)
GLUCOSE BLDC GLUCOMTR-MCNC: 161 MG/DL (ref 70–105)
GLUCOSE BLDC GLUCOMTR-MCNC: 247 MG/DL (ref 70–105)
GLUCOSE SERPL-MCNC: 165 MG/DL (ref 65–99)
GLUCOSE UR STRIP-MCNC: NEGATIVE MG/DL
HCT VFR BLD AUTO: 32.4 % (ref 34–46.6)
HGB BLD-MCNC: 10.4 G/DL (ref 12–15.9)
HGB UR QL STRIP.AUTO: ABNORMAL
HYALINE CASTS UR QL AUTO: ABNORMAL /LPF
KETONES UR QL STRIP: ABNORMAL
LEUKOCYTE ESTERASE UR QL STRIP.AUTO: ABNORMAL
LYMPHOCYTES # BLD AUTO: 1.5 10*3/MM3 (ref 0.7–3.1)
LYMPHOCYTES NFR BLD AUTO: 15.3 % (ref 19.6–45.3)
MAXIMAL PREDICTED HEART RATE: 145 BPM
MCH RBC QN AUTO: 26.8 PG (ref 26.6–33)
MCHC RBC AUTO-ENTMCNC: 32.1 G/DL (ref 31.5–35.7)
MCV RBC AUTO: 83.6 FL (ref 79–97)
MONOCYTES # BLD AUTO: 0.8 10*3/MM3 (ref 0.1–0.9)
MONOCYTES NFR BLD AUTO: 8.6 % (ref 5–12)
NEUTROPHILS NFR BLD AUTO: 7 10*3/MM3 (ref 1.7–7)
NEUTROPHILS NFR BLD AUTO: 73 % (ref 42.7–76)
NITRITE UR QL STRIP: NEGATIVE
NRBC BLD AUTO-RTO: 0.1 /100 WBC (ref 0–0.2)
PH UR STRIP.AUTO: 6.5 [PH] (ref 5–8)
PLATELET # BLD AUTO: 497 10*3/MM3 (ref 140–450)
PMV BLD AUTO: 8.1 FL (ref 6–12)
POTASSIUM SERPL-SCNC: 3.6 MMOL/L (ref 3.5–5.2)
PROT ?TM UR-MCNC: 130.7 MG/DL
PROT UR QL STRIP: ABNORMAL
RBC # BLD AUTO: 3.88 10*6/MM3 (ref 3.77–5.28)
RBC # UR STRIP: ABNORMAL /HPF
REF LAB TEST METHOD: ABNORMAL
SODIUM SERPL-SCNC: 144 MMOL/L (ref 136–145)
SP GR UR STRIP: 1.01 (ref 1–1.03)
SQUAMOUS #/AREA URNS HPF: ABNORMAL /HPF
STRESS TARGET HR: 123 BPM
UROBILINOGEN UR QL STRIP: ABNORMAL
VIT B12 BLD-MCNC: >2000 PG/ML (ref 211–946)
WBC # UR STRIP: ABNORMAL /HPF
WBC NRBC COR # BLD: 9.6 10*3/MM3 (ref 3.4–10.8)

## 2022-08-28 PROCEDURE — 99232 SBSQ HOSP IP/OBS MODERATE 35: CPT | Performed by: INTERNAL MEDICINE

## 2022-08-28 PROCEDURE — 99231 SBSQ HOSP IP/OBS SF/LOW 25: CPT

## 2022-08-28 PROCEDURE — 87205 SMEAR GRAM STAIN: CPT | Performed by: INTERNAL MEDICINE

## 2022-08-28 PROCEDURE — 25010000002 FUROSEMIDE PER 20 MG: Performed by: INTERNAL MEDICINE

## 2022-08-28 PROCEDURE — 97112 NEUROMUSCULAR REEDUCATION: CPT

## 2022-08-28 PROCEDURE — 63710000001 INSULIN LISPRO (HUMAN) PER 5 UNITS: Performed by: PHYSICIAN ASSISTANT

## 2022-08-28 PROCEDURE — 81001 URINALYSIS AUTO W/SCOPE: CPT | Performed by: INTERNAL MEDICINE

## 2022-08-28 PROCEDURE — 82570 ASSAY OF URINE CREATININE: CPT | Performed by: INTERNAL MEDICINE

## 2022-08-28 PROCEDURE — 84156 ASSAY OF PROTEIN URINE: CPT | Performed by: INTERNAL MEDICINE

## 2022-08-28 PROCEDURE — 82962 GLUCOSE BLOOD TEST: CPT

## 2022-08-28 PROCEDURE — 85025 COMPLETE CBC W/AUTO DIFF WBC: CPT | Performed by: PHYSICIAN ASSISTANT

## 2022-08-28 PROCEDURE — 93306 TTE W/DOPPLER COMPLETE: CPT | Performed by: INTERNAL MEDICINE

## 2022-08-28 PROCEDURE — 97110 THERAPEUTIC EXERCISES: CPT

## 2022-08-28 PROCEDURE — 93306 TTE W/DOPPLER COMPLETE: CPT

## 2022-08-28 PROCEDURE — 80048 BASIC METABOLIC PNL TOTAL CA: CPT | Performed by: PHYSICIAN ASSISTANT

## 2022-08-28 RX ADMIN — SENNOSIDES AND DOCUSATE SODIUM 1 TABLET: 50; 8.6 TABLET ORAL at 09:33

## 2022-08-28 RX ADMIN — SODIUM BICARBONATE 650 MG TABLET 1300 MG: at 16:03

## 2022-08-28 RX ADMIN — SODIUM BICARBONATE 650 MG TABLET 1300 MG: at 09:33

## 2022-08-28 RX ADMIN — LEVETIRACETAM 500 MG: 500 TABLET ORAL at 09:33

## 2022-08-28 RX ADMIN — HYDROCODONE BITARTRATE AND ACETAMINOPHEN 1 TABLET: 5; 325 TABLET ORAL at 22:25

## 2022-08-28 RX ADMIN — LIDOCAINE 1 PATCH: 50 PATCH CUTANEOUS at 09:32

## 2022-08-28 RX ADMIN — INSULIN LISPRO 4 UNITS: 100 INJECTION, SOLUTION INTRAVENOUS; SUBCUTANEOUS at 17:09

## 2022-08-28 RX ADMIN — AMLODIPINE BESYLATE 10 MG: 5 TABLET ORAL at 09:33

## 2022-08-28 RX ADMIN — BACLOFEN 5 MG: 10 TABLET ORAL at 09:33

## 2022-08-28 RX ADMIN — BACLOFEN 5 MG: 10 TABLET ORAL at 22:25

## 2022-08-28 RX ADMIN — FUROSEMIDE 80 MG: 10 INJECTION, SOLUTION INTRAMUSCULAR; INTRAVENOUS at 09:32

## 2022-08-28 RX ADMIN — ATORVASTATIN CALCIUM 40 MG: 40 TABLET, FILM COATED ORAL at 22:26

## 2022-08-28 RX ADMIN — INSULIN LISPRO 2 UNITS: 100 INJECTION, SOLUTION INTRAVENOUS; SUBCUTANEOUS at 12:24

## 2022-08-28 RX ADMIN — LEVETIRACETAM 500 MG: 500 TABLET ORAL at 22:25

## 2022-08-28 RX ADMIN — ATENOLOL 50 MG: 50 TABLET ORAL at 09:33

## 2022-08-28 RX ADMIN — ASPIRIN 325 MG ORAL TABLET 325 MG: 325 PILL ORAL at 09:33

## 2022-08-28 RX ADMIN — SENNOSIDES AND DOCUSATE SODIUM 1 TABLET: 50; 8.6 TABLET ORAL at 22:25

## 2022-08-28 RX ADMIN — Medication 10 ML: at 09:33

## 2022-08-28 RX ADMIN — FUROSEMIDE 80 MG: 10 INJECTION, SOLUTION INTRAMUSCULAR; INTRAVENOUS at 17:09

## 2022-08-28 RX ADMIN — INSULIN LISPRO 2 UNITS: 100 INJECTION, SOLUTION INTRAVENOUS; SUBCUTANEOUS at 09:41

## 2022-08-28 RX ADMIN — Medication 5 MG: at 22:26

## 2022-08-28 RX ADMIN — Medication 10 ML: at 22:28

## 2022-08-28 RX ADMIN — SODIUM BICARBONATE 650 MG TABLET 1300 MG: at 22:26

## 2022-08-28 RX ADMIN — POLYETHYLENE GLYCOL 3350 17 G: 17 POWDER, FOR SOLUTION ORAL at 09:32

## 2022-08-29 PROBLEM — F32.2 CURRENT SEVERE EPISODE OF MAJOR DEPRESSIVE DISORDER WITHOUT PSYCHOTIC FEATURES WITHOUT PRIOR EPISODE: Status: ACTIVE | Noted: 2022-08-29

## 2022-08-29 LAB
ANION GAP SERPL CALCULATED.3IONS-SCNC: 11 MMOL/L (ref 5–15)
APTT PPP: 28.9 SECONDS (ref 24–31)
BASOPHILS # BLD AUTO: 0 10*3/MM3 (ref 0–0.2)
BASOPHILS NFR BLD AUTO: 0.3 % (ref 0–1.5)
BUN SERPL-MCNC: 44 MG/DL (ref 8–23)
BUN/CREAT SERPL: 13.3 (ref 7–25)
CALCIUM SPEC-SCNC: 8.7 MG/DL (ref 8.6–10.5)
CHLORIDE SERPL-SCNC: 108 MMOL/L (ref 98–107)
CO2 SERPL-SCNC: 27 MMOL/L (ref 22–29)
CREAT SERPL-MCNC: 3.3 MG/DL (ref 0.57–1)
DEPRECATED RDW RBC AUTO: 42.9 FL (ref 37–54)
EGFRCR SERPLBLD CKD-EPI 2021: 14.1 ML/MIN/1.73
EOSINOPHIL # BLD AUTO: 0.1 10*3/MM3 (ref 0–0.4)
EOSINOPHIL NFR BLD AUTO: 1.1 % (ref 0.3–6.2)
ERYTHROCYTE [DISTWIDTH] IN BLOOD BY AUTOMATED COUNT: 14.5 % (ref 12.3–15.4)
GLUCOSE BLDC GLUCOMTR-MCNC: 171 MG/DL (ref 70–105)
GLUCOSE BLDC GLUCOMTR-MCNC: 195 MG/DL (ref 70–105)
GLUCOSE BLDC GLUCOMTR-MCNC: 205 MG/DL (ref 70–105)
GLUCOSE BLDC GLUCOMTR-MCNC: 237 MG/DL (ref 70–105)
GLUCOSE SERPL-MCNC: 283 MG/DL (ref 65–99)
HCT VFR BLD AUTO: 29.3 % (ref 34–46.6)
HGB BLD-MCNC: 9.4 G/DL (ref 12–15.9)
INR PPP: 1.04 (ref 0.93–1.1)
LYMPHOCYTES # BLD AUTO: 1.1 10*3/MM3 (ref 0.7–3.1)
LYMPHOCYTES NFR BLD AUTO: 11.1 % (ref 19.6–45.3)
MCH RBC QN AUTO: 26.7 PG (ref 26.6–33)
MCHC RBC AUTO-ENTMCNC: 32.2 G/DL (ref 31.5–35.7)
MCV RBC AUTO: 83 FL (ref 79–97)
MONOCYTES # BLD AUTO: 0.9 10*3/MM3 (ref 0.1–0.9)
MONOCYTES NFR BLD AUTO: 9.6 % (ref 5–12)
NEUTROPHILS NFR BLD AUTO: 7.6 10*3/MM3 (ref 1.7–7)
NEUTROPHILS NFR BLD AUTO: 77.9 % (ref 42.7–76)
NRBC BLD AUTO-RTO: 0 /100 WBC (ref 0–0.2)
PLATELET # BLD AUTO: 438 10*3/MM3 (ref 140–450)
PMV BLD AUTO: 8 FL (ref 6–12)
POTASSIUM SERPL-SCNC: 3.8 MMOL/L (ref 3.5–5.2)
PROTHROMBIN TIME: 10.7 SECONDS (ref 9.6–11.7)
RBC # BLD AUTO: 3.53 10*6/MM3 (ref 3.77–5.28)
SODIUM SERPL-SCNC: 146 MMOL/L (ref 136–145)
WBC NRBC COR # BLD: 9.8 10*3/MM3 (ref 3.4–10.8)

## 2022-08-29 PROCEDURE — 85610 PROTHROMBIN TIME: CPT | Performed by: RADIOLOGY

## 2022-08-29 PROCEDURE — 85730 THROMBOPLASTIN TIME PARTIAL: CPT | Performed by: RADIOLOGY

## 2022-08-29 PROCEDURE — 82962 GLUCOSE BLOOD TEST: CPT

## 2022-08-29 PROCEDURE — 63710000001 INSULIN LISPRO (HUMAN) PER 5 UNITS: Performed by: PHYSICIAN ASSISTANT

## 2022-08-29 PROCEDURE — 99232 SBSQ HOSP IP/OBS MODERATE 35: CPT | Performed by: INTERNAL MEDICINE

## 2022-08-29 PROCEDURE — 25010000002 LEVETIRACETAM IN NACL 0.82% 500 MG/100ML SOLUTION: Performed by: PSYCHIATRY & NEUROLOGY

## 2022-08-29 PROCEDURE — 25010000002 ONDANSETRON PER 1 MG: Performed by: EMERGENCY MEDICINE

## 2022-08-29 PROCEDURE — 97530 THERAPEUTIC ACTIVITIES: CPT

## 2022-08-29 RX ORDER — FUROSEMIDE 40 MG/1
80 TABLET ORAL
Status: DISCONTINUED | OUTPATIENT
Start: 2022-08-29 | End: 2022-08-30

## 2022-08-29 RX ADMIN — SODIUM BICARBONATE 650 MG TABLET 1300 MG: at 16:10

## 2022-08-29 RX ADMIN — ACETAMINOPHEN 650 MG: 325 TABLET, FILM COATED ORAL at 23:28

## 2022-08-29 RX ADMIN — Medication 10 ML: at 22:54

## 2022-08-29 RX ADMIN — INSULIN LISPRO 4 UNITS: 100 INJECTION, SOLUTION INTRAVENOUS; SUBCUTANEOUS at 12:37

## 2022-08-29 RX ADMIN — BACLOFEN 5 MG: 10 TABLET ORAL at 23:00

## 2022-08-29 RX ADMIN — ACETAMINOPHEN 650 MG: 325 TABLET, FILM COATED ORAL at 16:10

## 2022-08-29 RX ADMIN — SODIUM BICARBONATE 650 MG TABLET 1300 MG: at 09:23

## 2022-08-29 RX ADMIN — POLYETHYLENE GLYCOL 3350 17 G: 17 POWDER, FOR SOLUTION ORAL at 09:24

## 2022-08-29 RX ADMIN — FUROSEMIDE 80 MG: 40 TABLET ORAL at 18:35

## 2022-08-29 RX ADMIN — ATENOLOL 50 MG: 50 TABLET ORAL at 09:24

## 2022-08-29 RX ADMIN — LEVETIRACETAM 500 MG: 500 TABLET ORAL at 09:23

## 2022-08-29 RX ADMIN — SENNOSIDES AND DOCUSATE SODIUM 1 TABLET: 50; 8.6 TABLET ORAL at 23:00

## 2022-08-29 RX ADMIN — SODIUM BICARBONATE 650 MG TABLET 1300 MG: at 22:59

## 2022-08-29 RX ADMIN — SENNOSIDES AND DOCUSATE SODIUM 1 TABLET: 50; 8.6 TABLET ORAL at 09:23

## 2022-08-29 RX ADMIN — BACLOFEN 5 MG: 10 TABLET ORAL at 09:24

## 2022-08-29 RX ADMIN — ONDANSETRON 4 MG: 2 INJECTION INTRAMUSCULAR; INTRAVENOUS at 23:19

## 2022-08-29 RX ADMIN — FUROSEMIDE 80 MG: 40 TABLET ORAL at 09:24

## 2022-08-29 RX ADMIN — AMLODIPINE BESYLATE 10 MG: 5 TABLET ORAL at 09:24

## 2022-08-29 RX ADMIN — INSULIN LISPRO 2 UNITS: 100 INJECTION, SOLUTION INTRAVENOUS; SUBCUTANEOUS at 18:35

## 2022-08-29 RX ADMIN — Medication 10 ML: at 09:25

## 2022-08-29 RX ADMIN — LIDOCAINE 1 PATCH: 50 PATCH CUTANEOUS at 09:25

## 2022-08-29 RX ADMIN — DICLOFENAC SODIUM 2 G: 10 GEL TOPICAL at 18:36

## 2022-08-29 RX ADMIN — LEVETIRACETAM 500 MG: 5 INJECTION INTRAVENOUS at 22:54

## 2022-08-29 RX ADMIN — ATORVASTATIN CALCIUM 40 MG: 40 TABLET, FILM COATED ORAL at 22:59

## 2022-08-29 RX ADMIN — INSULIN LISPRO 4 UNITS: 100 INJECTION, SOLUTION INTRAVENOUS; SUBCUTANEOUS at 09:25

## 2022-08-29 NOTE — TELEPHONE ENCOUNTER
Called to give verbal orders on 8/29 @ 9:48AM and since patient is still in hospital , they aren't for sure when they are going to do this , but the did state they will call back to update us with that process.

## 2022-08-30 ENCOUNTER — APPOINTMENT (OUTPATIENT)
Dept: GENERAL RADIOLOGY | Facility: HOSPITAL | Age: 77
End: 2022-08-30

## 2022-08-30 ENCOUNTER — APPOINTMENT (OUTPATIENT)
Dept: INTERVENTIONAL RADIOLOGY/VASCULAR | Facility: HOSPITAL | Age: 77
End: 2022-08-30

## 2022-08-30 LAB
ANION GAP SERPL CALCULATED.3IONS-SCNC: 14 MMOL/L (ref 5–15)
BASOPHILS # BLD AUTO: 0 10*3/MM3 (ref 0–0.2)
BASOPHILS NFR BLD AUTO: 0.3 % (ref 0–1.5)
BUN SERPL-MCNC: 47 MG/DL (ref 8–23)
BUN/CREAT SERPL: 14.1 (ref 7–25)
CALCIUM SPEC-SCNC: 9.1 MG/DL (ref 8.6–10.5)
CHLORIDE SERPL-SCNC: 107 MMOL/L (ref 98–107)
CO2 SERPL-SCNC: 29 MMOL/L (ref 22–29)
CREAT SERPL-MCNC: 3.34 MG/DL (ref 0.57–1)
DEPRECATED RDW RBC AUTO: 42.4 FL (ref 37–54)
EGFRCR SERPLBLD CKD-EPI 2021: 13.8 ML/MIN/1.73
EOSINOPHIL # BLD AUTO: 0.1 10*3/MM3 (ref 0–0.4)
EOSINOPHIL NFR BLD AUTO: 0.6 % (ref 0.3–6.2)
ERYTHROCYTE [DISTWIDTH] IN BLOOD BY AUTOMATED COUNT: 14.3 % (ref 12.3–15.4)
GLUCOSE BLDC GLUCOMTR-MCNC: 175 MG/DL (ref 70–105)
GLUCOSE BLDC GLUCOMTR-MCNC: 206 MG/DL (ref 70–105)
GLUCOSE BLDC GLUCOMTR-MCNC: 251 MG/DL (ref 70–105)
GLUCOSE SERPL-MCNC: 223 MG/DL (ref 65–99)
HBV SURFACE AB SER RIA-ACNC: NORMAL
HBV SURFACE AG SERPL QL IA: NORMAL
HCT VFR BLD AUTO: 33.8 % (ref 34–46.6)
HGB BLD-MCNC: 10.5 G/DL (ref 12–15.9)
LYMPHOCYTES # BLD AUTO: 1.2 10*3/MM3 (ref 0.7–3.1)
LYMPHOCYTES NFR BLD AUTO: 9.7 % (ref 19.6–45.3)
MCH RBC QN AUTO: 26 PG (ref 26.6–33)
MCHC RBC AUTO-ENTMCNC: 31 G/DL (ref 31.5–35.7)
MCV RBC AUTO: 83.7 FL (ref 79–97)
MONOCYTES # BLD AUTO: 0.9 10*3/MM3 (ref 0.1–0.9)
MONOCYTES NFR BLD AUTO: 7.6 % (ref 5–12)
NEUTROPHILS NFR BLD AUTO: 10 10*3/MM3 (ref 1.7–7)
NEUTROPHILS NFR BLD AUTO: 81.8 % (ref 42.7–76)
NRBC BLD AUTO-RTO: 0 /100 WBC (ref 0–0.2)
PLATELET # BLD AUTO: 456 10*3/MM3 (ref 140–450)
PMV BLD AUTO: 8.4 FL (ref 6–12)
POTASSIUM SERPL-SCNC: 3.8 MMOL/L (ref 3.5–5.2)
RBC # BLD AUTO: 4.03 10*6/MM3 (ref 3.77–5.28)
SODIUM SERPL-SCNC: 150 MMOL/L (ref 136–145)
WBC NRBC COR # BLD: 12.3 10*3/MM3 (ref 3.4–10.8)

## 2022-08-30 PROCEDURE — C1752 CATH,HEMODIALYSIS,SHORT-TERM: HCPCS

## 2022-08-30 PROCEDURE — 86704 HEP B CORE ANTIBODY TOTAL: CPT | Performed by: INTERNAL MEDICINE

## 2022-08-30 PROCEDURE — 76937 US GUIDE VASCULAR ACCESS: CPT

## 2022-08-30 PROCEDURE — 0 LIDOCAINE 1 % SOLUTION: Performed by: RADIOLOGY

## 2022-08-30 PROCEDURE — 86706 HEP B SURFACE ANTIBODY: CPT | Performed by: INTERNAL MEDICINE

## 2022-08-30 PROCEDURE — 80048 BASIC METABOLIC PNL TOTAL CA: CPT | Performed by: PHYSICIAN ASSISTANT

## 2022-08-30 PROCEDURE — 25010000002 HEPARIN (PORCINE) PER 1000 UNITS: Performed by: RADIOLOGY

## 2022-08-30 PROCEDURE — 97112 NEUROMUSCULAR REEDUCATION: CPT

## 2022-08-30 PROCEDURE — 99232 SBSQ HOSP IP/OBS MODERATE 35: CPT | Performed by: INTERNAL MEDICINE

## 2022-08-30 PROCEDURE — 77001 FLUOROGUIDE FOR VEIN DEVICE: CPT

## 2022-08-30 PROCEDURE — 74018 RADEX ABDOMEN 1 VIEW: CPT

## 2022-08-30 PROCEDURE — 82962 GLUCOSE BLOOD TEST: CPT

## 2022-08-30 PROCEDURE — 85025 COMPLETE CBC W/AUTO DIFF WBC: CPT | Performed by: PHYSICIAN ASSISTANT

## 2022-08-30 PROCEDURE — 87340 HEPATITIS B SURFACE AG IA: CPT | Performed by: INTERNAL MEDICINE

## 2022-08-30 PROCEDURE — 97530 THERAPEUTIC ACTIVITIES: CPT

## 2022-08-30 PROCEDURE — C1894 INTRO/SHEATH, NON-LASER: HCPCS

## 2022-08-30 PROCEDURE — 63710000001 INSULIN LISPRO (HUMAN) PER 5 UNITS: Performed by: PHYSICIAN ASSISTANT

## 2022-08-30 PROCEDURE — 25010000002 ONDANSETRON PER 1 MG: Performed by: EMERGENCY MEDICINE

## 2022-08-30 RX ORDER — HEPARIN SODIUM 1000 [USP'U]/ML
INJECTION, SOLUTION INTRAVENOUS; SUBCUTANEOUS
Status: COMPLETED | OUTPATIENT
Start: 2022-08-30 | End: 2022-08-30

## 2022-08-30 RX ORDER — LIDOCAINE HYDROCHLORIDE 10 MG/ML
INJECTION, SOLUTION INFILTRATION; PERINEURAL
Status: COMPLETED | OUTPATIENT
Start: 2022-08-30 | End: 2022-08-30

## 2022-08-30 RX ORDER — BISACODYL 10 MG
10 SUPPOSITORY, RECTAL RECTAL DAILY PRN
Status: DISCONTINUED | OUTPATIENT
Start: 2022-08-30 | End: 2022-09-03 | Stop reason: HOSPADM

## 2022-08-30 RX ORDER — DEXTROSE MONOHYDRATE 50 MG/ML
100 INJECTION, SOLUTION INTRAVENOUS CONTINUOUS
Status: DISPENSED | OUTPATIENT
Start: 2022-08-30 | End: 2022-08-30

## 2022-08-30 RX ADMIN — BACLOFEN 5 MG: 10 TABLET ORAL at 08:48

## 2022-08-30 RX ADMIN — Medication 10 ML: at 23:14

## 2022-08-30 RX ADMIN — DEXTROSE MONOHYDRATE 100 ML/HR: 50 INJECTION, SOLUTION INTRAVENOUS at 10:40

## 2022-08-30 RX ADMIN — ACETAMINOPHEN 650 MG: 325 TABLET, FILM COATED ORAL at 06:18

## 2022-08-30 RX ADMIN — AMLODIPINE BESYLATE 10 MG: 5 TABLET ORAL at 08:48

## 2022-08-30 RX ADMIN — BISACODYL 10 MG: 10 SUPPOSITORY RECTAL at 08:44

## 2022-08-30 RX ADMIN — LEVETIRACETAM 500 MG: 500 TABLET ORAL at 08:48

## 2022-08-30 RX ADMIN — ATORVASTATIN CALCIUM 40 MG: 40 TABLET, FILM COATED ORAL at 23:16

## 2022-08-30 RX ADMIN — INSULIN LISPRO 6 UNITS: 100 INJECTION, SOLUTION INTRAVENOUS; SUBCUTANEOUS at 08:47

## 2022-08-30 RX ADMIN — LEVETIRACETAM 500 MG: 500 TABLET ORAL at 23:16

## 2022-08-30 RX ADMIN — BACLOFEN 5 MG: 10 TABLET ORAL at 23:16

## 2022-08-30 RX ADMIN — LIDOCAINE 1 PATCH: 50 PATCH CUTANEOUS at 08:49

## 2022-08-30 RX ADMIN — ATENOLOL 50 MG: 50 TABLET ORAL at 08:48

## 2022-08-30 RX ADMIN — ONDANSETRON 4 MG: 2 INJECTION INTRAMUSCULAR; INTRAVENOUS at 06:03

## 2022-08-30 RX ADMIN — SENNOSIDES AND DOCUSATE SODIUM 1 TABLET: 50; 8.6 TABLET ORAL at 08:48

## 2022-08-30 RX ADMIN — LIDOCAINE HYDROCHLORIDE 4 ML: 10 INJECTION, SOLUTION INFILTRATION; PERINEURAL at 14:01

## 2022-08-30 RX ADMIN — SENNOSIDES AND DOCUSATE SODIUM 1 TABLET: 50; 8.6 TABLET ORAL at 23:15

## 2022-08-30 RX ADMIN — INSULIN LISPRO 4 UNITS: 100 INJECTION, SOLUTION INTRAVENOUS; SUBCUTANEOUS at 17:31

## 2022-08-30 RX ADMIN — Medication 10 ML: at 10:40

## 2022-08-30 RX ADMIN — ONDANSETRON 4 MG: 2 INJECTION INTRAMUSCULAR; INTRAVENOUS at 23:21

## 2022-08-30 RX ADMIN — HEPARIN SODIUM 2400 UNITS: 1000 INJECTION, SOLUTION INTRAVENOUS; SUBCUTANEOUS at 14:10

## 2022-08-30 RX ADMIN — INSULIN LISPRO 2 UNITS: 100 INJECTION, SOLUTION INTRAVENOUS; SUBCUTANEOUS at 12:29

## 2022-08-31 LAB
ANION GAP SERPL CALCULATED.3IONS-SCNC: 11 MMOL/L (ref 5–15)
BASOPHILS # BLD AUTO: 0 10*3/MM3 (ref 0–0.2)
BASOPHILS NFR BLD AUTO: 0.2 % (ref 0–1.5)
BUN SERPL-MCNC: 50 MG/DL (ref 8–23)
BUN/CREAT SERPL: 14 (ref 7–25)
CALCIUM SPEC-SCNC: 8.9 MG/DL (ref 8.6–10.5)
CHLORIDE SERPL-SCNC: 100 MMOL/L (ref 98–107)
CO2 SERPL-SCNC: 28 MMOL/L (ref 22–29)
CREAT SERPL-MCNC: 3.56 MG/DL (ref 0.57–1)
DEPRECATED RDW RBC AUTO: 43.8 FL (ref 37–54)
EGFRCR SERPLBLD CKD-EPI 2021: 12.8 ML/MIN/1.73
EOSINOPHIL # BLD AUTO: 0.5 10*3/MM3 (ref 0–0.4)
EOSINOPHIL NFR BLD AUTO: 3.5 % (ref 0.3–6.2)
ERYTHROCYTE [DISTWIDTH] IN BLOOD BY AUTOMATED COUNT: 14.6 % (ref 12.3–15.4)
GLUCOSE BLDC GLUCOMTR-MCNC: 150 MG/DL (ref 70–105)
GLUCOSE BLDC GLUCOMTR-MCNC: 230 MG/DL (ref 70–105)
GLUCOSE BLDC GLUCOMTR-MCNC: 243 MG/DL (ref 70–105)
GLUCOSE SERPL-MCNC: 240 MG/DL (ref 65–99)
HBV CORE AB SERPL QL IA: NEGATIVE
HCT VFR BLD AUTO: 32.1 % (ref 34–46.6)
HGB BLD-MCNC: 9.9 G/DL (ref 12–15.9)
LYMPHOCYTES # BLD AUTO: 1.7 10*3/MM3 (ref 0.7–3.1)
LYMPHOCYTES NFR BLD AUTO: 12.7 % (ref 19.6–45.3)
MCH RBC QN AUTO: 26 PG (ref 26.6–33)
MCHC RBC AUTO-ENTMCNC: 30.9 G/DL (ref 31.5–35.7)
MCV RBC AUTO: 84.1 FL (ref 79–97)
MONOCYTES # BLD AUTO: 0.9 10*3/MM3 (ref 0.1–0.9)
MONOCYTES NFR BLD AUTO: 6.8 % (ref 5–12)
NEUTROPHILS NFR BLD AUTO: 10 10*3/MM3 (ref 1.7–7)
NEUTROPHILS NFR BLD AUTO: 76.8 % (ref 42.7–76)
NRBC BLD AUTO-RTO: 0.1 /100 WBC (ref 0–0.2)
PLATELET # BLD AUTO: 394 10*3/MM3 (ref 140–450)
PMV BLD AUTO: 8 FL (ref 6–12)
POTASSIUM SERPL-SCNC: 4 MMOL/L (ref 3.5–5.2)
PTH-INTACT SERPL-MCNC: 212.9 PG/ML (ref 15–65)
RBC # BLD AUTO: 3.82 10*6/MM3 (ref 3.77–5.28)
SODIUM SERPL-SCNC: 139 MMOL/L (ref 136–145)
WBC NRBC COR # BLD: 13 10*3/MM3 (ref 3.4–10.8)

## 2022-08-31 PROCEDURE — 85025 COMPLETE CBC W/AUTO DIFF WBC: CPT | Performed by: PHYSICIAN ASSISTANT

## 2022-08-31 PROCEDURE — 92526 ORAL FUNCTION THERAPY: CPT

## 2022-08-31 PROCEDURE — 82962 GLUCOSE BLOOD TEST: CPT

## 2022-08-31 PROCEDURE — 80048 BASIC METABOLIC PNL TOTAL CA: CPT | Performed by: PHYSICIAN ASSISTANT

## 2022-08-31 PROCEDURE — 83970 ASSAY OF PARATHORMONE: CPT | Performed by: INTERNAL MEDICINE

## 2022-08-31 PROCEDURE — 63710000001 INSULIN LISPRO (HUMAN) PER 5 UNITS: Performed by: PHYSICIAN ASSISTANT

## 2022-08-31 PROCEDURE — 99232 SBSQ HOSP IP/OBS MODERATE 35: CPT | Performed by: INTERNAL MEDICINE

## 2022-08-31 PROCEDURE — 97530 THERAPEUTIC ACTIVITIES: CPT

## 2022-08-31 RX ADMIN — ACETAMINOPHEN 650 MG: 325 TABLET, FILM COATED ORAL at 06:29

## 2022-08-31 RX ADMIN — POLYETHYLENE GLYCOL 3350 17 G: 17 POWDER, FOR SOLUTION ORAL at 13:03

## 2022-08-31 RX ADMIN — Medication 10 ML: at 21:01

## 2022-08-31 RX ADMIN — LIDOCAINE 1 PATCH: 50 PATCH CUTANEOUS at 07:56

## 2022-08-31 RX ADMIN — SENNOSIDES AND DOCUSATE SODIUM 1 TABLET: 50; 8.6 TABLET ORAL at 13:02

## 2022-08-31 RX ADMIN — BACLOFEN 5 MG: 10 TABLET ORAL at 13:02

## 2022-08-31 RX ADMIN — ATORVASTATIN CALCIUM 40 MG: 40 TABLET, FILM COATED ORAL at 21:00

## 2022-08-31 RX ADMIN — ATENOLOL 50 MG: 50 TABLET ORAL at 13:02

## 2022-08-31 RX ADMIN — AMLODIPINE BESYLATE 10 MG: 5 TABLET ORAL at 13:02

## 2022-08-31 RX ADMIN — Medication 10 ML: at 13:03

## 2022-08-31 RX ADMIN — LEVETIRACETAM 500 MG: 500 TABLET ORAL at 07:56

## 2022-08-31 RX ADMIN — LEVETIRACETAM 500 MG: 500 TABLET ORAL at 21:00

## 2022-08-31 RX ADMIN — BACLOFEN 5 MG: 10 TABLET ORAL at 21:00

## 2022-08-31 RX ADMIN — INSULIN LISPRO 4 UNITS: 100 INJECTION, SOLUTION INTRAVENOUS; SUBCUTANEOUS at 17:01

## 2022-09-01 LAB
ANION GAP SERPL CALCULATED.3IONS-SCNC: 9 MMOL/L (ref 5–15)
BASOPHILS # BLD AUTO: 0 10*3/MM3 (ref 0–0.2)
BASOPHILS NFR BLD AUTO: 0.3 % (ref 0–1.5)
BUN SERPL-MCNC: 28 MG/DL (ref 8–23)
BUN/CREAT SERPL: 10.5 (ref 7–25)
CALCIUM SPEC-SCNC: 7.9 MG/DL (ref 8.6–10.5)
CHLORIDE SERPL-SCNC: 104 MMOL/L (ref 98–107)
CO2 SERPL-SCNC: 24 MMOL/L (ref 22–29)
CREAT SERPL-MCNC: 2.66 MG/DL (ref 0.57–1)
DEPRECATED RDW RBC AUTO: 42.9 FL (ref 37–54)
EGFRCR SERPLBLD CKD-EPI 2021: 18.2 ML/MIN/1.73
EOSINOPHIL # BLD AUTO: 0.5 10*3/MM3 (ref 0–0.4)
EOSINOPHIL NFR BLD AUTO: 4.1 % (ref 0.3–6.2)
ERYTHROCYTE [DISTWIDTH] IN BLOOD BY AUTOMATED COUNT: 14.4 % (ref 12.3–15.4)
GLUCOSE BLDC GLUCOMTR-MCNC: 179 MG/DL (ref 70–105)
GLUCOSE BLDC GLUCOMTR-MCNC: 181 MG/DL (ref 70–105)
GLUCOSE BLDC GLUCOMTR-MCNC: 205 MG/DL (ref 70–105)
GLUCOSE BLDC GLUCOMTR-MCNC: 219 MG/DL (ref 70–105)
GLUCOSE SERPL-MCNC: 210 MG/DL (ref 65–99)
HCT VFR BLD AUTO: 27.8 % (ref 34–46.6)
HGB BLD-MCNC: 8.9 G/DL (ref 12–15.9)
LYMPHOCYTES # BLD AUTO: 1.5 10*3/MM3 (ref 0.7–3.1)
LYMPHOCYTES NFR BLD AUTO: 13.3 % (ref 19.6–45.3)
MCH RBC QN AUTO: 26.5 PG (ref 26.6–33)
MCHC RBC AUTO-ENTMCNC: 31.9 G/DL (ref 31.5–35.7)
MCV RBC AUTO: 83.1 FL (ref 79–97)
MONOCYTES # BLD AUTO: 0.8 10*3/MM3 (ref 0.1–0.9)
MONOCYTES NFR BLD AUTO: 6.9 % (ref 5–12)
NEUTROPHILS NFR BLD AUTO: 75.4 % (ref 42.7–76)
NEUTROPHILS NFR BLD AUTO: 8.8 10*3/MM3 (ref 1.7–7)
NRBC BLD AUTO-RTO: 0.2 /100 WBC (ref 0–0.2)
PLATELET # BLD AUTO: 322 10*3/MM3 (ref 140–450)
PMV BLD AUTO: 8.2 FL (ref 6–12)
POTASSIUM SERPL-SCNC: 3.9 MMOL/L (ref 3.5–5.2)
RBC # BLD AUTO: 3.35 10*6/MM3 (ref 3.77–5.28)
SODIUM SERPL-SCNC: 137 MMOL/L (ref 136–145)
WBC NRBC COR # BLD: 11.6 10*3/MM3 (ref 3.4–10.8)

## 2022-09-01 PROCEDURE — 85025 COMPLETE CBC W/AUTO DIFF WBC: CPT | Performed by: PHYSICIAN ASSISTANT

## 2022-09-01 PROCEDURE — 63710000001 INSULIN LISPRO (HUMAN) PER 5 UNITS: Performed by: INTERNAL MEDICINE

## 2022-09-01 PROCEDURE — 97110 THERAPEUTIC EXERCISES: CPT

## 2022-09-01 PROCEDURE — 80048 BASIC METABOLIC PNL TOTAL CA: CPT | Performed by: PHYSICIAN ASSISTANT

## 2022-09-01 PROCEDURE — 99231 SBSQ HOSP IP/OBS SF/LOW 25: CPT | Performed by: INTERNAL MEDICINE

## 2022-09-01 PROCEDURE — 97116 GAIT TRAINING THERAPY: CPT

## 2022-09-01 PROCEDURE — 82962 GLUCOSE BLOOD TEST: CPT

## 2022-09-01 RX ORDER — INSULIN LISPRO 100 [IU]/ML
0-14 INJECTION, SOLUTION INTRAVENOUS; SUBCUTANEOUS
Status: DISCONTINUED | OUTPATIENT
Start: 2022-09-01 | End: 2022-09-03 | Stop reason: HOSPADM

## 2022-09-01 RX ADMIN — LEVETIRACETAM 500 MG: 500 TABLET ORAL at 20:38

## 2022-09-01 RX ADMIN — INSULIN LISPRO 5 UNITS: 100 INJECTION, SOLUTION INTRAVENOUS; SUBCUTANEOUS at 12:12

## 2022-09-01 RX ADMIN — ACETAMINOPHEN 650 MG: 325 TABLET, FILM COATED ORAL at 20:38

## 2022-09-01 RX ADMIN — ATORVASTATIN CALCIUM 40 MG: 40 TABLET, FILM COATED ORAL at 20:38

## 2022-09-01 RX ADMIN — Medication 5 MG: at 20:38

## 2022-09-01 RX ADMIN — Medication 10 ML: at 08:23

## 2022-09-01 RX ADMIN — ATENOLOL 50 MG: 50 TABLET ORAL at 08:25

## 2022-09-01 RX ADMIN — AMLODIPINE BESYLATE 10 MG: 5 TABLET ORAL at 08:22

## 2022-09-01 RX ADMIN — BACLOFEN 5 MG: 10 TABLET ORAL at 20:38

## 2022-09-01 RX ADMIN — INSULIN LISPRO 3 UNITS: 100 INJECTION, SOLUTION INTRAVENOUS; SUBCUTANEOUS at 22:52

## 2022-09-01 RX ADMIN — Medication 10 ML: at 20:38

## 2022-09-01 RX ADMIN — SENNOSIDES AND DOCUSATE SODIUM 1 TABLET: 50; 8.6 TABLET ORAL at 20:38

## 2022-09-01 RX ADMIN — INSULIN LISPRO 5 UNITS: 100 INJECTION, SOLUTION INTRAVENOUS; SUBCUTANEOUS at 17:35

## 2022-09-01 RX ADMIN — LEVETIRACETAM 500 MG: 500 TABLET ORAL at 08:22

## 2022-09-01 RX ADMIN — BACLOFEN 5 MG: 10 TABLET ORAL at 08:22

## 2022-09-01 RX ADMIN — LIDOCAINE 1 PATCH: 50 PATCH CUTANEOUS at 08:22

## 2022-09-01 NOTE — PLAN OF CARE
Problem: Adult Inpatient Plan of Care  Goal: Absence of Hospital-Acquired Illness or Injury  Intervention: Identify and Manage Fall Risk  Recent Flowsheet Documentation  Taken 9/1/2022 0815 by Zakiya Cormier RN  Safety Promotion/Fall Prevention:   activity supervised   assistive device/personal items within reach   clutter free environment maintained   fall prevention program maintained   nonskid shoes/slippers when out of bed   safety round/check completed  Intervention: Prevent Skin Injury  Recent Flowsheet Documentation  Taken 9/1/2022 0815 by Zakiya Cormier RN  Skin Protection:   adhesive use limited   tubing/devices free from skin contact  Intervention: Prevent and Manage VTE (Venous Thromboembolism) Risk  Recent Flowsheet Documentation  Taken 9/1/2022 0815 by Zakiya Cormier RN  VTE Prevention/Management:   bilateral   sequential compression devices on  Intervention: Prevent Infection  Recent Flowsheet Documentation  Taken 9/1/2022 0815 by Zakiya Cormier RN  Infection Prevention: environmental surveillance performed  Goal: Optimal Comfort and Wellbeing  Intervention: Provide Person-Centered Care  Recent Flowsheet Documentation  Taken 9/1/2022 0815 by Zakiya Cormier RN  Trust Relationship/Rapport:   care explained   thoughts/feelings acknowledged   Goal Outcome Evaluation:               Pt is A/Ox4. Pt is to have dialysis catheter placed in AM.

## 2022-09-01 NOTE — PROGRESS NOTES
"RENAL/KCC:     LOS: 7 days    Patient Care Team:  Rashawn Garibay MD as PCP - General    Chief Complaint: SHERRILL/CKD    Subjective     Interval History:   Chart reviewed, s/p shiley and acute HD.  Still feels weak.  Non-oliguric.    Objective     Vital Sign Min/Max for last 24 hours  Temp  Min: 97.6 °F (36.4 °C)  Max: 98.8 °F (37.1 °C)   BP  Min: 112/58  Max: 148/62   Pulse  Min: 54  Max: 61   Resp  Min: 11  Max: 17   SpO2  Min: 94 %  Max: 100 %   No data recorded   No data recorded     Flowsheet Rows    Flowsheet Row First Filed Value   Admission Height 170.2 cm (67\") Documented at 08/22/2022 1533   Admission Weight 127 kg (280 lb) Documented at 08/22/2022 1533          No intake/output data recorded.  I/O last 3 completed shifts:  In: 840 [P.O.:840]  Out: 1850 [Urine:450; Other:1400]    Physical Exam:  GEN: Awake, Lethargic  ENT: PERRL, EOMI, MMM  NECK: Supple, no JVD  CHEST: CTAB, no W/R/C  CV: RRR, no M/G/R, +edema  ABD: Soft, NT, +BS  SKIN: Warm and Dry  NEURO: CN's intact      WBC WBC   Date Value Ref Range Status   09/01/2022 11.60 (H) 3.40 - 10.80 10*3/mm3 Final   08/31/2022 13.00 (H) 3.40 - 10.80 10*3/mm3 Final   08/30/2022 12.30 (H) 3.40 - 10.80 10*3/mm3 Final      HGB Hemoglobin   Date Value Ref Range Status   09/01/2022 8.9 (L) 12.0 - 15.9 g/dL Final   08/31/2022 9.9 (L) 12.0 - 15.9 g/dL Final   08/30/2022 10.5 (L) 12.0 - 15.9 g/dL Final      HCT Hematocrit   Date Value Ref Range Status   09/01/2022 27.8 (L) 34.0 - 46.6 % Final   08/31/2022 32.1 (L) 34.0 - 46.6 % Final   08/30/2022 33.8 (L) 34.0 - 46.6 % Final      Platlets No results found for: LABPLAT   MCV MCV   Date Value Ref Range Status   09/01/2022 83.1 79.0 - 97.0 fL Final   08/31/2022 84.1 79.0 - 97.0 fL Final   08/30/2022 83.7 79.0 - 97.0 fL Final          Sodium Sodium   Date Value Ref Range Status   09/01/2022 137 136 - 145 mmol/L Final   08/31/2022 139 136 - 145 mmol/L Final   08/30/2022 150 (H) 136 - 145 mmol/L Final      Potassium Potassium "   Date Value Ref Range Status   09/01/2022 3.9 3.5 - 5.2 mmol/L Final   08/31/2022 4.0 3.5 - 5.2 mmol/L Final   08/30/2022 3.8 3.5 - 5.2 mmol/L Final      Chloride Chloride   Date Value Ref Range Status   09/01/2022 104 98 - 107 mmol/L Final   08/31/2022 100 98 - 107 mmol/L Final   08/30/2022 107 98 - 107 mmol/L Final      CO2 CO2   Date Value Ref Range Status   09/01/2022 24.0 22.0 - 29.0 mmol/L Final   08/31/2022 28.0 22.0 - 29.0 mmol/L Final   08/30/2022 29.0 22.0 - 29.0 mmol/L Final      BUN BUN   Date Value Ref Range Status   09/01/2022 28 (H) 8 - 23 mg/dL Final   08/31/2022 50 (H) 8 - 23 mg/dL Final   08/30/2022 47 (H) 8 - 23 mg/dL Final      Creatinine Creatinine   Date Value Ref Range Status   09/01/2022 2.66 (H) 0.57 - 1.00 mg/dL Final   08/31/2022 3.56 (H) 0.57 - 1.00 mg/dL Final   08/30/2022 3.34 (H) 0.57 - 1.00 mg/dL Final      Calcium Calcium   Date Value Ref Range Status   09/01/2022 7.9 (L) 8.6 - 10.5 mg/dL Final   08/31/2022 8.9 8.6 - 10.5 mg/dL Final   08/30/2022 9.1 8.6 - 10.5 mg/dL Final      PO4 No results found for: CAPO4   Albumin No results found for: ALBUMIN   Magnesium No results found for: MG   Uric Acid No results found for: URICACID        Results Review:     I reviewed the patient's new clinical results.    amLODIPine, 10 mg, Oral, Daily  atenolol, 50 mg, Oral, Daily  atorvastatin, 40 mg, Oral, Nightly  baclofen, 5 mg, Oral, Q12H  insulin lispro, 0-14 Units, Subcutaneous, 4x Daily With Meals & Nightly  levETIRAcetam, 500 mg, Oral, Q12H  lidocaine, 1 patch, Transdermal, Q24H  polyethylene glycol, 17 g, Oral, Daily  senna-docusate sodium, 1 tablet, Oral, BID  sodium chloride, 10 mL, Intravenous, Q12H      Medication Review: Reviewed    Assessment & Plan     1) ESRD  2) Anemia  3) Metabolic acidosis  4) HTN  5) DM  6) Fluid overload/Pulmonary edema  7) Hypernatremia     Plan: Continue MWF HD.  Noted plans for rehab.  Working on permcath placement.      Josh Hernández MD   [facility]  09/01/22  12:18 EDT

## 2022-09-01 NOTE — PLAN OF CARE
Goal Outcome Evaluation:     Problem: Adult Inpatient Plan of Care  Goal: Plan of Care Review  Outcome: Ongoing, Progressing  Goal: Patient-Specific Goal (Individualized)  Outcome: Ongoing, Progressing  Goal: Absence of Hospital-Acquired Illness or Injury  Outcome: Ongoing, Progressing  Intervention: Identify and Manage Fall Risk  Recent Flowsheet Documentation  Taken 9/1/2022 0200 by Tahmina Cao RN  Safety Promotion/Fall Prevention:   safety round/check completed   room organization consistent   fall prevention program maintained   clutter free environment maintained   assistive device/personal items within reach  Taken 9/1/2022 0000 by Tahmina Cao RN  Safety Promotion/Fall Prevention:   safety round/check completed   room organization consistent   fall prevention program maintained   clutter free environment maintained   assistive device/personal items within reach  Taken 8/31/2022 2200 by Tahmina Cao RN  Safety Promotion/Fall Prevention:   room organization consistent   safety round/check completed   fall prevention program maintained   clutter free environment maintained   assistive device/personal items within reach  Taken 8/31/2022 2030 by Tahmina Cao RN  Safety Promotion/Fall Prevention:   safety round/check completed   room organization consistent   fall prevention program maintained   clutter free environment maintained   assistive device/personal items within reach  Intervention: Prevent Skin Injury  Recent Flowsheet Documentation  Taken 9/1/2022 0000 by Tahmina Cao RN  Skin Protection: adhesive use limited  Taken 8/31/2022 2030 by Tahmina Cao RN  Skin Protection: adhesive use limited  Intervention: Prevent and Manage VTE (Venous Thromboembolism) Risk  Recent Flowsheet Documentation  Taken 9/1/2022 0000 by Tahmina Cao RN  Activity Management: activity adjusted per tolerance  Taken 8/31/2022 2030 by Tahmina Cao RN  Activity Management: activity adjusted per tolerance  Intervention: Prevent  Infection  Recent Flowsheet Documentation  Taken 9/1/2022 0200 by Tahmina Cao RN  Infection Prevention:   hand hygiene promoted   rest/sleep promoted   personal protective equipment utilized  Taken 9/1/2022 0000 by Tahmina Cao RN  Infection Prevention:   hand hygiene promoted   personal protective equipment utilized   rest/sleep promoted  Taken 8/31/2022 2200 by Tahmina Cao RN  Infection Prevention:   hand hygiene promoted   personal protective equipment utilized   rest/sleep promoted  Taken 8/31/2022 2030 by Tahmina Cao RN  Infection Prevention:   personal protective equipment utilized   rest/sleep promoted   hand hygiene promoted  Goal: Optimal Comfort and Wellbeing  Outcome: Ongoing, Progressing  Intervention: Provide Person-Centered Care  Recent Flowsheet Documentation  Taken 9/1/2022 0000 by Tahmina Cao RN  Trust Relationship/Rapport: care explained  Taken 8/31/2022 2030 by Tahmina Cao RN  Trust Relationship/Rapport:   care explained   questions answered   thoughts/feelings acknowledged  Goal: Readiness for Transition of Care  Outcome: Ongoing, Progressing     Problem: Diabetes Comorbidity  Goal: Blood Glucose Level Within Targeted Range  Outcome: Ongoing, Progressing     Problem: Hypertension Comorbidity  Goal: Blood Pressure in Desired Range  Outcome: Ongoing, Progressing  Intervention: Maintain Blood Pressure Management  Recent Flowsheet Documentation  Taken 8/31/2022 2030 by Tahmina Cao RN  Medication Review/Management: medications reviewed     Problem: UTI (Urinary Tract Infection)  Goal: Improved Infection Symptoms  Outcome: Ongoing, Progressing     Problem: Pain Acute  Goal: Acceptable Pain Control and Functional Ability  Outcome: Ongoing, Progressing  Intervention: Prevent or Manage Pain  Recent Flowsheet Documentation  Taken 9/1/2022 0000 by Tahmina Cao RN  Sleep/Rest Enhancement: regular sleep/rest pattern promoted  Taken 8/31/2022 2030 by Tahmina Cao RN  Sleep/Rest Enhancement:  regular sleep/rest pattern promoted  Medication Review/Management: medications reviewed  Intervention: Optimize Psychosocial Wellbeing  Recent Flowsheet Documentation  Taken 9/1/2022 0000 by Tahmina Cao RN  Diversional Activities: television  Taken 8/31/2022 2030 by Tahmina Cao RN  Supportive Measures: active listening utilized  Diversional Activities: television     Problem: Fall Injury Risk  Goal: Absence of Fall and Fall-Related Injury  Outcome: Ongoing, Progressing  Intervention: Identify and Manage Contributors  Recent Flowsheet Documentation  Taken 8/31/2022 2030 by Tahmina Cao RN  Medication Review/Management: medications reviewed  Intervention: Promote Injury-Free Environment  Recent Flowsheet Documentation  Taken 9/1/2022 0200 by Tahmina Cao RN  Safety Promotion/Fall Prevention:   safety round/check completed   room organization consistent   fall prevention program maintained   clutter free environment maintained   assistive device/personal items within reach  Taken 9/1/2022 0000 by Tahmina Cao RN  Safety Promotion/Fall Prevention:   safety round/check completed   room organization consistent   fall prevention program maintained   clutter free environment maintained   assistive device/personal items within reach  Taken 8/31/2022 2200 by Tahmina Cao RN  Safety Promotion/Fall Prevention:   room organization consistent   safety round/check completed   fall prevention program maintained   clutter free environment maintained   assistive device/personal items within reach  Taken 8/31/2022 2030 by Tahmina Cao RN  Safety Promotion/Fall Prevention:   safety round/check completed   room organization consistent   fall prevention program maintained   clutter free environment maintained   assistive device/personal items within reach     Problem: Skin Injury Risk Increased  Goal: Skin Health and Integrity  Outcome: Ongoing, Progressing  Intervention: Optimize Skin Protection  Recent Flowsheet  Documentation  Taken 9/1/2022 0000 by Tahmina Cao RN  Pressure Reduction Techniques:   frequent weight shift encouraged   weight shift assistance provided  Pressure Reduction Devices: pressure-redistributing mattress utilized  Skin Protection: adhesive use limited  Taken 8/31/2022 2030 by Tahmina Cao RN  Pressure Reduction Techniques:   frequent weight shift encouraged   weight shift assistance provided  Pressure Reduction Devices: pressure-redistributing mattress utilized  Skin Protection: adhesive use limited

## 2022-09-01 NOTE — PLAN OF CARE
"Goal Outcome Evaluation:         Assessment: Abiola MARIN Kaz much improved mobility this date. Pt expressed pleasure w/ ability to improve and begin amb. She presents with functional mobility impairments which indicate the need for skilled intervention. Tolerating session today without incident. Will continue to follow and progress as tolerated.     Plan/Recommendations:   Moderate Intensity Therapy recommended post-acute care. This is recommended as therapy feels the patient would require 3-4 days per week and wouldn't tolerate \"3 hour daily\" rehab intensity. SNF would be the preferred choice. If the patient does not agree to SNF, arrange HH or OP depending on home bound status. If patient is medically complex, consider LTACH.. Pt requires no DME at discharge.     Pt desires Skilled Rehab placement at discharge. Pt cooperative; agreeable to therapeutic recommendations and plan of care.           "

## 2022-09-01 NOTE — THERAPY TREATMENT NOTE
"Subjective: Pt agreeable to therapeutic plan of care. Pt report still having severe neck pain but feeling overall better today.    Objective:     Bed mobility - Min-A and Mod-A  Transfers - Mod-A and with rolling walker; poor eccentric control for return to sitting.   Ambulation - 10 feet Min-A and Assist x 2 w/ rw; needed to follow w/ chair as pt became very tired after this distance.     Therapeutic exercise: sit to stand x 4 reps w/ concentration on eccentric control for stand to sit. Able to exhibit proper control 3 of 4 times. Also B aarom for hip, arom for laq, ankle DF/PF x 20 for all exercises in sitting.     Vitals: WNL    Pain: 10 VAS  Education: Provided education on importance of mobility and skilled verbal / tactile cueing throughout intervention.     Assessment: Abiola MARIN Kaz much improved mobility this date. Pt expressed pleasure w/ ability to improve and begin amb. She presents with functional mobility impairments which indicate the need for skilled intervention. Tolerating session today without incident. Will continue to follow and progress as tolerated.     Plan/Recommendations:   Moderate Intensity Therapy recommended post-acute care. This is recommended as therapy feels the patient would require 3-4 days per week and wouldn't tolerate \"3 hour daily\" rehab intensity. SNF would be the preferred choice. If the patient does not agree to SNF, arrange HH or OP depending on home bound status. If patient is medically complex, consider LTACH.. Pt requires no DME at discharge.     Pt desires Skilled Rehab placement at discharge. Pt cooperative; agreeable to therapeutic recommendations and plan of care.         Basic Mobility 6-click:  Rollin = Total, A lot = 2, A little = 3; 4 = None  Supine>Sit:   1 = Total, A lot = 2, A little = 3; 4 = None   Sit>Stand with arms:  1 = Total, A lot = 2, A little = 3; 4 = None  Bed>Chair:   1 = Total, A lot = 2, A little = 3; 4 = None  Ambulate in room:  1 = " Total, A lot = 2, A little = 3; 4 = None  3-5 Steps with railin = Total, A lot = 2, A little = 3; 4 = None  Score: 13    Modified Garza: 4 = Moderately severe disability (Unable to attend to own bodily needs without assistance, and unable to walk unassisted)     Post-Tx Position: Up in Chair, Alarms activated and Call light and personal items within reachLEs elevated.   PPE: gloves, surgical mask, eyewear protection

## 2022-09-01 NOTE — CONSULTS
Nutrition Services    Patient Name: Abiola Mccurdy  YOB: 1945  MRN: [id number]  Admission date: 8/22/2022    Comment:    Boost Glucose Control TID (Provides 570 kcals, 48 g protein if consumed)       PPE Documentation        PPE Worn By Provider N/A, did not enter pt's room this date   PPE Worn By Patient  N/A     CLINICAL NUTRITION ASSESSMENT      Reason for Assessment 9/1: LOS x10 days     H&P      Past Medical History:   Diagnosis Date   • Chronic low back pain 5/1/2017   • Chronic renal insufficiency, stage III (moderate) (Formerly [facility]) 10/26/2018   • Degeneration of intervertebral disc of lumbar region 5/25/2017   • Diabetes mellitus (Formerly [facility])     type 2    • Diabetic foot ulcer ([facility]) 9/21/2017   • Diabetic peripheral neuropathy ([facility]) 10/25/2016   • Dyspeptic diarrhea 9/29/2016   • Essential hypertension 7/14/2016   • Glaucoma    • History of prolapse of bladder 2017    and vaginal    • Hyperlipidemia    • Hypertension    • Knee pain, right    • Legally blind    • Low back pain    • Morbidly obese (Formerly [facility]) 11/21/2019   • Neck pain on right side 8/23/2022   • Osteopenia 08/2017    DEXA- on prolia   • Osteopenia after menopause 8/29/2017   • Peripheral venous insufficiency 10/25/2016   • Polyarthralgia 5/25/2017   • Thyroid nodule 4/25/2019   • Type 2 diabetes mellitus with diabetic chronic kidney disease ([facility]) 11/15/2018   • Type 2 diabetes mellitus with hyperglycemia, without long-term current use of insulin ([facility]) 7/14/2016       Past Surgical History:   Procedure Laterality Date   • CATARACT EXTRACTION     • HYSTERECTOMY  01/2018        Current Problems   SHERRILL with progression to ESRD    Acute TME     Anemia of CKD    UTI    DM    HTN    HLD    Generalized weakness         Encounter Information        Trending Narrative     9/1: Unable to assess in person this date, chart reviewed. Noted patient awaiting TDC placement this will d/c.     Anthropometrics        Current Height, Weight Height: 170.2 cm  "(67\")  Weight: 121 kg (265 lb 10.5 oz) (08/29/22 0228)       Ideal Body Weight (IBW) 135lb   Usual Body Weight (UBW) STEVE       Trending Weight Hx     This admission: 9/1: current weight down 5% since admit             PTA: 9/1: current weight 8% down since 6/2020    Wt Readings from Last 30 Encounters:   08/29/22 0228 121 kg (265 lb 10.5 oz)   08/28/22 1141 122 kg (269 lb)   08/27/22 0541 122 kg (269 lb 10 oz)   08/27/22 0004 119 kg (261 lb 11 oz)   08/26/22 1016 118 kg (261 lb)   08/26/22 0446 119 kg (261 lb 6.4 oz)   08/25/22 0613 126 kg (277 lb 9 oz)   08/22/22 2025 126 kg (277 lb 5.4 oz)   08/22/22 1533 127 kg (280 lb)   08/16/22 1257 121 kg (265 lb 12.8 oz)   06/29/20 1449 131 kg (288 lb 9.6 oz)   11/21/19 1030 127 kg (279 lb)   11/15/19 0858 126 kg (277 lb)   11/05/19 0818 126 kg (277 lb)   08/23/19 0834 127 kg (281 lb)   05/24/19 0947 126 kg (277 lb)      BMI kg/m2 Body mass index is 41.61 kg/m².       Labs        Pertinent Labs    Results from last 7 days   Lab Units 09/01/22  0318 08/31/22  0813 08/30/22  0107 08/26/22  2120 08/26/22  2056   SODIUM mmol/L 137 139 150*   < > 142   POTASSIUM mmol/L 3.9 4.0 3.8   < > 4.4   CHLORIDE mmol/L 104 100 107   < > 107   CO2 mmol/L 24.0 28.0 29.0   < > 20.0*   BUN mg/dL 28* 50* 47*   < > 42*   CREATININE mg/dL 2.66* 3.56* 3.34*   < > 3.43*   CALCIUM mg/dL 7.9* 8.9 9.1   < > 9.1   BILIRUBIN mg/dL  --   --   --   --  0.2   ALK PHOS U/L  --   --   --   --  75   ALT (SGPT) U/L  --   --   --   --  5   AST (SGOT) U/L  --   --   --   --  17   GLUCOSE mg/dL 210* 240* 223*   < > 195*    < > = values in this interval not displayed.     Results from last 7 days   Lab Units 09/01/22  0318 08/27/22  2352 08/26/22  2120   HEMOGLOBIN g/dL 8.9*   < > 10.1*   HEMATOCRIT % 27.8*   < > 32.6*   TRIGLYCERIDES mg/dL  --   --  104    < > = values in this interval not displayed.     COVID19   Date Value Ref Range Status   08/22/2022 Not Detected Not Detected - Ref. Range Final     Lab Results "   Component Value Date    HGBA1C 7.0 (H) 08/24/2022        Medications    Scheduled Medications amLODIPine, 10 mg, Oral, Daily  atenolol, 50 mg, Oral, Daily  atorvastatin, 40 mg, Oral, Nightly  baclofen, 5 mg, Oral, Q12H  insulin lispro, 0-14 Units, Subcutaneous, 4x Daily With Meals & Nightly  levETIRAcetam, 500 mg, Oral, Q12H  lidocaine, 1 patch, Transdermal, Q24H  polyethylene glycol, 17 g, Oral, Daily  senna-docusate sodium, 1 tablet, Oral, BID  sodium chloride, 10 mL, Intravenous, Q12H        Infusions      PRN Medications •  acetaminophen  •  bisacodyl  •  dextrose  •  dextrose  •  glucagon (human recombinant)  •  ipratropium-albuterol  •  melatonin  •  ondansetron  •  [COMPLETED] Insert peripheral IV **AND** sodium chloride  •  sodium chloride  •  sodium chloride     Physical Findings        Trending Physical   Appearance, NFPE 9/1: Unable to assess in person this date   --  Edema  2+ BLE     Bowel Function Stool Output  Stool Unmeasured Occurrence: 1 (08/31/22 2100)  Bowel Incontinence: Yes (08/31/22 2100)  Stool Amount: large (08/31/22 2030)      Tubes none     Chewing/Swallowing No issues reported   Skin No breakdown documented   --  Current Nutrition Orders & Evaluation of Intake       Oral Nutrition     Food Allergies NKFA   Current PO Diet Diet Diabetic/Consistent Carbs; Diabetic - Consistent Carb   Supplement    PO Evaluation     Trending % PO Intake 25-50%   --  Nutritional Risk Screening        NRS-2002 Score          Nutrition Diagnosis         Nutrition Dx Problem 1 Inadequate oral intake related to clinical course as evidenced by 25-50% meal intake.      Nutrition Dx Problem 2 Unintentional weight loss related to poor po intake with illness as evidenced by 5% weight loss x10 days.     Intervention Goal         Intervention Goal(s) Meal intake at least 65% of meals  Stable weight     Nutrition Intervention        RD Action Add Boost GC TID     Nutrition Prescription          [facility]    Supplement Prescription Boost Glucose Control TID (Provides 570 kcals, 48 g protein if consumed)      --  Monitor/Evaluation        Monitor PO intake, Supplement intake, Pertinent labs, Weight, Skin status, GI status         Electronically signed by:  Sarahy Menendez RD  09/01/22 15:10 EDT

## 2022-09-01 NOTE — CASE MANAGEMENT/SOCIAL WORK
Continued Stay Note  MICHAEL Peres     Patient Name: Abiola Mccurdy  MRN: [id number]  Today's Date: 9/1/2022    Admit Date: 8/22/2022     Discharge Plan     Row Name 09/01/22 1158       Plan    Plan [facility] accepted (8/31) and will provide IP Dialysis w/[facility]). No precert required. PASRR per facility. Barrier: New dialysis (started 8/31). OP Dialysis w/[facility] requested on 8/30, [facility] will set up [facility] for outpt.    Plan Comments Needs tunnelled cath.           Chart review only.     Expected Discharge Date and Time     Expected Discharge Date Expected Discharge Time    Sep 2, 2022             Darrel Edouard RN

## 2022-09-01 NOTE — PROGRESS NOTES
[facility] Medicine Services Daily Progress Note    Patient Name: Abiola Mccurdy  : 1945  MRN: [id number]  Primary Care Physician:  Rashawn Garibay MD  Date of admission: 2022      Subjective      Brief interim history:77 y.o. female with past medical history significant for T2DM with peripheral neuropathy, hypertension, HLD, morbid obesity, BMI 43, DJD, CKD, stage III, anemia, suspected underlying depression/psychiatric issues, and chronic low back pain.  According to admission note, she presented to [facility] ED on 2022 via EMS with complaints of 2-week history of squeezing right-sided neck pain that is exacerbated by movement.   She denies any fall, no trauma or  injury.  Initial laboratory in ED with finding of metabolic acidosis in the setting of SHERRILL/CKD, stage III.  X-ray of the cervical spine () showed no acute bony abnormalities or significant DJD.  Urine analysis with findings suggestive of UTI.   Denies fever, chills no chest pain, shortness of breath, stridor, no nausea or vomiting.  Patient was seen and consultation by nephrologist and PT/OT with recommendation.  She is admitted with right-sided neck pain, suspected UTI, and SHERRILL versus CKD with metabolic acidosis.  Since admission, patient has had many medical complaints.     2022: Seen and examined in follow-up.  Resting in bed in moderate discomfort due to right sided neck pain.     2022: Seen and examined in follow-up.  She is still complain of moderate right-sided neck pain with some swelling, but no fever, and no stridor.     22: Seen and examined in follow-up.  Event noted for decreased alertness/drowsiness.  Patient complaining of shortness of breath and BNP is mildly elevated, with 2D echo pending.  MRI of the neck (3/25) without contrast was unremarkable.     2022: Seen and examined in follow-up.  Patient with multiple complaints since admission.  She was moved to the [facility] last  evening due to suspected stroke versus seizure activities. She  complains of depression.  CT of the head (8/26) showed mild generalized parenchymal volume loss, autolyzed no acute intracranial abnormalities.  Seen by neurologist and MRI of the brain is mostly unremarkable for infarct.    8/28/2022: Seen and examined in follow-up.  She is mostly nonverbal this morning and only talk responded when she want to.  No focal neurologic findings and MRI (8/27) is unrevealing.  Currently awaiting psychiatric evaluation for suspected depression.    8/29/2022: Patient seen and examined this morning.  Remains about the same, nonverbal, does not want to respond or follow any commands.  Awake and alert but closes eyes when talking to her.  Per nephrology, renal biopsy planned today.    8/30/2022: Patient seen and examined this morning.  Slightly better today compared to yesterday, still nonverbal but nods yes and no and also follows some commands.  Some nausea and vomiting this morning, nephrology starting her on dialysis.    8/31/2022: Patient seen and examined this morning.  Seen during dialysis, feeling significantly better, answers all questions and follows all commands now.  Denies any complaints but does feel overall weak.  Wants to go to rehab.  Discussed with daughter as well, all questions answered.    9/1/2022: Patient seen and examined this morning.  Doing well, denies any complaints.  Awaiting tunneled catheter placement prior to going to rehab.    Pertinent positives as noted in HPI/subjective.  All other systems were reviewed and are negative.           Objective      Vitals:   Temp:  [97.6 °F (36.4 °C)-98.8 °F (37.1 °C)] 98.8 °F (37.1 °C)  Heart Rate:  [53-61] 56  Resp:  [11-18] 11  BP: (112-148)/(46-64) 143/51    Physical exam:    General: Awake, alert, NAD  Eyes: PERRL, EOMI, conjunctivae are clear  Cardiovascular: Regular rate and rhythm, no murmurs  Respiratory: Clear to auscultation bilaterally, no wheezing or  rales, unlabored breathing  Abdomen: Soft, nontender, positive bowel sounds, no guarding  Neurologic: A&O, CN grossly intact, moves all extremities spontaneously  Musculoskeletal: Generalized weakness, no deformities  Skin: Warm, dry, intact    Result Review    Result Review:  I have personally reviewed the results from the time of this admission to 9/1/2022 11:14 EDT and agree with these findings:  [x]  Laboratory  [x]  Microbiology  []  Radiology  []  EKG/Telemetry   []  Cardiology/Vascular   []  Pathology  []  Old records  []  Other:      Assessment & Plan        amLODIPine, 10 mg, Oral, Daily  atenolol, 50 mg, Oral, Daily  atorvastatin, 40 mg, Oral, Nightly  baclofen, 5 mg, Oral, Q12H  insulin lispro, 0-14 Units, Subcutaneous, 4x Daily With Meals & Nightly  levETIRAcetam, 500 mg, Oral, Q12H  lidocaine, 1 patch, Transdermal, Q24H  polyethylene glycol, 17 g, Oral, Daily  senna-docusate sodium, 1 tablet, Oral, BID  sodium chloride, 10 mL, Intravenous, Q12H             Active Hospital Problems:  Active Hospital Problems    Diagnosis    • **Acute-on-chronic kidney injury (HCC)    • Current severe episode of major depressive disorder without psychotic features without prior episode (HCC)    • Acute renal failure superimposed on chronic kidney disease, unspecified CKD stage, unspecified acute renal failure type ([facility])    • Neck pain on right side    • Chronic kidney disease, stage 3 (moderate)    • Diabetic peripheral neuropathy (HCC)    • Mixed hyperlipidemia    • Type 2 diabetes mellitus with hyperglycemia, without long-term current use of insulin (HCC)    • Essential hypertension      Assessment/plan:             SHERRILL with progression to ESRD          -renal ultrasound (6/23), with finding consistent with chronic renal disease and no evidence of hydronephrosis  -Possible progression of CKD versus GN versus AIN  -Nephrology following, plan for renal biopsy as outpatient  -Patient became more uremic, temporary dialysis  cath placed on 8/30, started on dialysis as of 8/31  -Tunneled dialysis catheter ordered per nephrology, stable to go to rehab once the catheter is done.    Acute toxic metabolic encephalopathy, resolved  Possible depression  -Improved, could be multifactorial with underlying UTI, possible seizure related per neurology, on Keppra per neurology, neurology signed off  -May be underlying depression, psych following  -Patient at baseline, awake and alert     Acute on chronic HFrEF/Volume overload          -in setting of CKD, stage III        -chest x-ray showed cardiomegaly with pulmonary vascular congestion         -2D echo (8/26) showed depressed LV function, with EF of 33%         -Diuresis as tolerated with ultrafiltration, nephrology following     Right-sided neck pain         -Resolved       -X-ray of the C-spine/MRI of the neck is unrevealing        -supportive care, pain control,     Anemia of CKD      -Continue iron supplements, EPO per nephrology     Probable UTI        -denies dysuria, or urinary frequency     Hypertension      -Norvasc/atenolol     T2DM      fairly controlled, most recent A1c of 7.0 (8/24)     Morbid obesity, BMI of 43       - lifestyle changes with weight reduction, decrease caloric intake and exercise as tolerated     HLD     -Lipitor     Generalized weakness/deconditioning        -PT/OT.  PT to reassess for possible placement to rehab or SNF      DVT prophylaxis:  Mechanical DVT prophylaxis orders are present.    CODE STATUS:    Code Status (Patient has no pulse and is not breathing): CPR (Attempt to Resuscitate)  Medical Interventions (Patient has pulse or is breathing): Full Support  Release to patient: Routine Release      Disposition: Medically stable for discharge once tunneled dialysis catheter is placed    Electronically signed by Amelia Wolfe DO, 09/01/22, 11:14 EDT.  [facility]

## 2022-09-02 ENCOUNTER — APPOINTMENT (OUTPATIENT)
Dept: INTERVENTIONAL RADIOLOGY/VASCULAR | Facility: HOSPITAL | Age: 77
End: 2022-09-02

## 2022-09-02 LAB
ANION GAP SERPL CALCULATED.3IONS-SCNC: 8 MMOL/L (ref 5–15)
BASOPHILS # BLD AUTO: 0 10*3/MM3 (ref 0–0.2)
BASOPHILS NFR BLD AUTO: 0.3 % (ref 0–1.5)
BUN SERPL-MCNC: 41 MG/DL (ref 8–23)
BUN/CREAT SERPL: 12.4 (ref 7–25)
CALCIUM SPEC-SCNC: 8.4 MG/DL (ref 8.6–10.5)
CHLORIDE SERPL-SCNC: 100 MMOL/L (ref 98–107)
CO2 SERPL-SCNC: 26 MMOL/L (ref 22–29)
CREAT SERPL-MCNC: 3.3 MG/DL (ref 0.57–1)
DEPRECATED RDW RBC AUTO: 41.1 FL (ref 37–54)
EGFRCR SERPLBLD CKD-EPI 2021: 14.1 ML/MIN/1.73
EOSINOPHIL # BLD AUTO: 0.4 10*3/MM3 (ref 0–0.4)
EOSINOPHIL NFR BLD AUTO: 3.1 % (ref 0.3–6.2)
ERYTHROCYTE [DISTWIDTH] IN BLOOD BY AUTOMATED COUNT: 14 % (ref 12.3–15.4)
GLUCOSE BLDC GLUCOMTR-MCNC: 115 MG/DL (ref 70–105)
GLUCOSE BLDC GLUCOMTR-MCNC: 128 MG/DL (ref 70–105)
GLUCOSE BLDC GLUCOMTR-MCNC: 186 MG/DL (ref 70–105)
GLUCOSE SERPL-MCNC: 145 MG/DL (ref 65–99)
HCT VFR BLD AUTO: 29.7 % (ref 34–46.6)
HGB BLD-MCNC: 9.4 G/DL (ref 12–15.9)
LYMPHOCYTES # BLD AUTO: 2 10*3/MM3 (ref 0.7–3.1)
LYMPHOCYTES NFR BLD AUTO: 16.4 % (ref 19.6–45.3)
MCH RBC QN AUTO: 26.1 PG (ref 26.6–33)
MCHC RBC AUTO-ENTMCNC: 31.6 G/DL (ref 31.5–35.7)
MCV RBC AUTO: 82.6 FL (ref 79–97)
MONOCYTES # BLD AUTO: 1 10*3/MM3 (ref 0.1–0.9)
MONOCYTES NFR BLD AUTO: 8.2 % (ref 5–12)
NEUTROPHILS NFR BLD AUTO: 72 % (ref 42.7–76)
NEUTROPHILS NFR BLD AUTO: 8.6 10*3/MM3 (ref 1.7–7)
NRBC BLD AUTO-RTO: 0 /100 WBC (ref 0–0.2)
PLATELET # BLD AUTO: 349 10*3/MM3 (ref 140–450)
PMV BLD AUTO: 7.8 FL (ref 6–12)
POTASSIUM SERPL-SCNC: 4.5 MMOL/L (ref 3.5–5.2)
RBC # BLD AUTO: 3.6 10*6/MM3 (ref 3.77–5.28)
SODIUM SERPL-SCNC: 134 MMOL/L (ref 136–145)
WBC NRBC COR # BLD: 11.9 10*3/MM3 (ref 3.4–10.8)

## 2022-09-02 PROCEDURE — C1750 CATH, HEMODIALYSIS,LONG-TERM: HCPCS

## 2022-09-02 PROCEDURE — C1894 INTRO/SHEATH, NON-LASER: HCPCS

## 2022-09-02 PROCEDURE — 25010000002 FENTANYL CITRATE (PF) 50 MCG/ML SOLUTION: Performed by: RADIOLOGY

## 2022-09-02 PROCEDURE — 25010000002 CEFAZOLIN PER 500 MG: Performed by: RADIOLOGY

## 2022-09-02 PROCEDURE — B5181ZA FLUOROSCOPY OF SUPERIOR VENA CAVA USING LOW OSMOLAR CONTRAST, GUIDANCE: ICD-10-PCS | Performed by: RADIOLOGY

## 2022-09-02 PROCEDURE — 85025 COMPLETE CBC W/AUTO DIFF WBC: CPT | Performed by: PHYSICIAN ASSISTANT

## 2022-09-02 PROCEDURE — 82962 GLUCOSE BLOOD TEST: CPT

## 2022-09-02 PROCEDURE — 80048 BASIC METABOLIC PNL TOTAL CA: CPT | Performed by: PHYSICIAN ASSISTANT

## 2022-09-02 PROCEDURE — 25010000002 ONDANSETRON PER 1 MG: Performed by: RADIOLOGY

## 2022-09-02 PROCEDURE — 99239 HOSP IP/OBS DSCHRG MGMT >30: CPT | Performed by: INTERNAL MEDICINE

## 2022-09-02 PROCEDURE — 02HV33Z INSERTION OF INFUSION DEVICE INTO SUPERIOR VENA CAVA, PERCUTANEOUS APPROACH: ICD-10-PCS | Performed by: RADIOLOGY

## 2022-09-02 PROCEDURE — 36558 INSERT TUNNELED CV CATH: CPT

## 2022-09-02 PROCEDURE — 77001 FLUOROGUIDE FOR VEIN DEVICE: CPT

## 2022-09-02 PROCEDURE — 0JH63XZ INSERTION OF TUNNELED VASCULAR ACCESS DEVICE INTO CHEST SUBCUTANEOUS TISSUE AND FASCIA, PERCUTANEOUS APPROACH: ICD-10-PCS | Performed by: RADIOLOGY

## 2022-09-02 PROCEDURE — 63710000001 INSULIN LISPRO (HUMAN) PER 5 UNITS: Performed by: INTERNAL MEDICINE

## 2022-09-02 PROCEDURE — 76937 US GUIDE VASCULAR ACCESS: CPT

## 2022-09-02 PROCEDURE — 0 LIDOCAINE 1 % SOLUTION: Performed by: RADIOLOGY

## 2022-09-02 RX ORDER — POLYETHYLENE GLYCOL 3350 17 G/17G
17 POWDER, FOR SOLUTION ORAL DAILY
Start: 2022-09-03 | End: 2022-09-28 | Stop reason: HOSPADM

## 2022-09-02 RX ORDER — ALBUMIN (HUMAN) 12.5 G/50ML
25 SOLUTION INTRAVENOUS AS NEEDED
Status: DISCONTINUED | OUTPATIENT
Start: 2022-09-02 | End: 2022-09-03 | Stop reason: HOSPADM

## 2022-09-02 RX ORDER — BACLOFEN 5 MG/1
5 TABLET ORAL EVERY 12 HOURS SCHEDULED
Start: 2022-09-02 | End: 2022-09-02 | Stop reason: SDUPTHER

## 2022-09-02 RX ORDER — BISACODYL 10 MG
10 SUPPOSITORY, RECTAL RECTAL DAILY PRN
Start: 2022-09-02 | End: 2022-09-28 | Stop reason: HOSPADM

## 2022-09-02 RX ORDER — ATORVASTATIN CALCIUM 20 MG/1
20 TABLET, FILM COATED ORAL
Qty: 30 TABLET | Refills: 0 | Status: SHIPPED | OUTPATIENT
Start: 2022-09-02 | End: 2022-09-28 | Stop reason: HOSPADM

## 2022-09-02 RX ORDER — ATENOLOL 50 MG/1
50 TABLET ORAL DAILY
Qty: 30 TABLET | Refills: 0 | Status: SHIPPED | OUTPATIENT
Start: 2022-09-03 | End: 2022-09-28 | Stop reason: HOSPADM

## 2022-09-02 RX ORDER — AMLODIPINE BESYLATE 10 MG/1
10 TABLET ORAL DAILY
Qty: 30 TABLET | Refills: 0 | Status: SHIPPED | OUTPATIENT
Start: 2022-09-02 | End: 2022-09-28 | Stop reason: HOSPADM

## 2022-09-02 RX ORDER — AMLODIPINE BESYLATE 10 MG/1
10 TABLET ORAL DAILY
Qty: 30 TABLET | Refills: 0 | Status: SHIPPED | OUTPATIENT
Start: 2022-09-02 | End: 2022-09-02 | Stop reason: SDUPTHER

## 2022-09-02 RX ORDER — LEVETIRACETAM 500 MG/1
500 TABLET ORAL EVERY 12 HOURS SCHEDULED
Qty: 60 TABLET | Refills: 0 | Status: SHIPPED | OUTPATIENT
Start: 2022-09-02 | End: 2022-09-02 | Stop reason: SDUPTHER

## 2022-09-02 RX ORDER — ATORVASTATIN CALCIUM 20 MG/1
20 TABLET, FILM COATED ORAL
Qty: 30 TABLET | Refills: 0 | Status: SHIPPED | OUTPATIENT
Start: 2022-09-02 | End: 2022-09-02 | Stop reason: SDUPTHER

## 2022-09-02 RX ORDER — LEVETIRACETAM 500 MG/1
500 TABLET ORAL EVERY 12 HOURS SCHEDULED
Qty: 60 TABLET | Refills: 0 | Status: SHIPPED | OUTPATIENT
Start: 2022-09-02 | End: 2022-09-28 | Stop reason: HOSPADM

## 2022-09-02 RX ORDER — LEVETIRACETAM 500 MG/1
500 TABLET ORAL EVERY 12 HOURS SCHEDULED
Start: 2022-09-02 | End: 2022-09-02 | Stop reason: SDUPTHER

## 2022-09-02 RX ORDER — BACLOFEN 5 MG/1
5 TABLET ORAL EVERY 12 HOURS SCHEDULED
Qty: 30 TABLET | Refills: 0 | Status: SHIPPED | OUTPATIENT
Start: 2022-09-02 | End: 2022-09-02 | Stop reason: SDUPTHER

## 2022-09-02 RX ORDER — BACLOFEN 5 MG/1
5 TABLET ORAL EVERY 12 HOURS SCHEDULED
Qty: 30 TABLET | Refills: 0 | Status: SHIPPED | OUTPATIENT
Start: 2022-09-02 | End: 2022-09-28 | Stop reason: HOSPADM

## 2022-09-02 RX ORDER — ONDANSETRON 2 MG/ML
INJECTION INTRAMUSCULAR; INTRAVENOUS
Status: COMPLETED | OUTPATIENT
Start: 2022-09-02 | End: 2022-09-02

## 2022-09-02 RX ORDER — FENTANYL CITRATE 50 UG/ML
INJECTION, SOLUTION INTRAMUSCULAR; INTRAVENOUS
Status: COMPLETED | OUTPATIENT
Start: 2022-09-02 | End: 2022-09-02

## 2022-09-02 RX ORDER — LIDOCAINE HYDROCHLORIDE 10 MG/ML
INJECTION, SOLUTION INFILTRATION; PERINEURAL
Status: COMPLETED | OUTPATIENT
Start: 2022-09-02 | End: 2022-09-02

## 2022-09-02 RX ORDER — AMOXICILLIN 250 MG
1 CAPSULE ORAL 2 TIMES DAILY
Start: 2022-09-02 | End: 2022-09-28 | Stop reason: HOSPADM

## 2022-09-02 RX ORDER — ATENOLOL 50 MG/1
50 TABLET ORAL DAILY
Qty: 30 TABLET | Refills: 0 | Status: SHIPPED | OUTPATIENT
Start: 2022-09-03 | End: 2022-09-02 | Stop reason: SDUPTHER

## 2022-09-02 RX ORDER — ALBUMIN (HUMAN) 12.5 G/50ML
25 SOLUTION INTRAVENOUS AS NEEDED
Status: ACTIVE | OUTPATIENT
Start: 2022-09-02 | End: 2022-09-02

## 2022-09-02 RX ADMIN — LIDOCAINE HYDROCHLORIDE 7 ML: 10 INJECTION, SOLUTION INFILTRATION; PERINEURAL at 16:10

## 2022-09-02 RX ADMIN — BACLOFEN 5 MG: 10 TABLET ORAL at 20:29

## 2022-09-02 RX ADMIN — AMLODIPINE BESYLATE 10 MG: 5 TABLET ORAL at 11:41

## 2022-09-02 RX ADMIN — LEVETIRACETAM 500 MG: 500 TABLET ORAL at 11:41

## 2022-09-02 RX ADMIN — FENTANYL CITRATE 50 MCG: 50 INJECTION, SOLUTION INTRAMUSCULAR; INTRAVENOUS at 15:57

## 2022-09-02 RX ADMIN — ONDANSETRON 4 MG: 2 INJECTION INTRAMUSCULAR; INTRAVENOUS at 15:55

## 2022-09-02 RX ADMIN — LEVETIRACETAM 500 MG: 500 TABLET ORAL at 20:29

## 2022-09-02 RX ADMIN — ATORVASTATIN CALCIUM 40 MG: 40 TABLET, FILM COATED ORAL at 20:29

## 2022-09-02 RX ADMIN — CEFAZOLIN SODIUM 2 G: 1 INJECTION, POWDER, FOR SOLUTION INTRAMUSCULAR; INTRAVENOUS at 15:16

## 2022-09-02 RX ADMIN — POLYETHYLENE GLYCOL 3350 17 G: 17 POWDER, FOR SOLUTION ORAL at 11:41

## 2022-09-02 RX ADMIN — ATENOLOL 50 MG: 50 TABLET ORAL at 11:41

## 2022-09-02 RX ADMIN — BACLOFEN 5 MG: 10 TABLET ORAL at 11:41

## 2022-09-02 RX ADMIN — SENNOSIDES AND DOCUSATE SODIUM 1 TABLET: 50; 8.6 TABLET ORAL at 11:41

## 2022-09-02 RX ADMIN — INSULIN LISPRO 3 UNITS: 100 INJECTION, SOLUTION INTRAVENOUS; SUBCUTANEOUS at 20:29

## 2022-09-02 RX ADMIN — LIDOCAINE 1 PATCH: 50 PATCH CUTANEOUS at 11:40

## 2022-09-02 RX ADMIN — LIDOCAINE HYDROCHLORIDE 5 ML: 10 INJECTION, SOLUTION INFILTRATION; PERINEURAL at 16:07

## 2022-09-02 NOTE — PROGRESS NOTES
"RENAL/KCC:     LOS: 8 days    Patient Care Team:  Rashawn Garibay MD as PCP - General    Chief Complaint: SHERRILL/CKD    Subjective     Interval History:   Chart reviewed, seen on HD.  Feeling better.  Still feels weak.  Non-oliguric.    Objective     Vital Sign Min/Max for last 24 hours  Temp  Min: 98.4 °F (36.9 °C)  Max: 98.8 °F (37.1 °C)   BP  Min: 100/48  Max: 152/75   Pulse  Min: 56  Max: 64   Resp  Min: 11  Max: 21   SpO2  Min: 98 %  Max: 100 %   No data recorded   No data recorded     Flowsheet Rows    Flowsheet Row First Filed Value   Admission Height 170.2 cm (67\") Documented at 08/22/2022 1533   Admission Weight 127 kg (280 lb) Documented at 08/22/2022 1533          No intake/output data recorded.  I/O last 3 completed shifts:  In: 360 [P.O.:360]  Out: -     Physical Exam:  GEN: Awake, Lethargic  ENT: PERRL, EOMI, MMM  NECK: Supple, no JVD  CHEST: CTAB, no W/R/C  CV: RRR, no M/G/R, +edema  ABD: Soft, NT, +BS  SKIN: Warm and Dry  NEURO: CN's intact      WBC WBC   Date Value Ref Range Status   09/02/2022 11.90 (H) 3.40 - 10.80 10*3/mm3 Final   09/01/2022 11.60 (H) 3.40 - 10.80 10*3/mm3 Final   08/31/2022 13.00 (H) 3.40 - 10.80 10*3/mm3 Final      HGB Hemoglobin   Date Value Ref Range Status   09/02/2022 9.4 (L) 12.0 - 15.9 g/dL Final   09/01/2022 8.9 (L) 12.0 - 15.9 g/dL Final   08/31/2022 9.9 (L) 12.0 - 15.9 g/dL Final      HCT Hematocrit   Date Value Ref Range Status   09/02/2022 29.7 (L) 34.0 - 46.6 % Final   09/01/2022 27.8 (L) 34.0 - 46.6 % Final   08/31/2022 32.1 (L) 34.0 - 46.6 % Final      Platlets No results found for: LABPLAT   MCV MCV   Date Value Ref Range Status   09/02/2022 82.6 79.0 - 97.0 fL Final   09/01/2022 83.1 79.0 - 97.0 fL Final   08/31/2022 84.1 79.0 - 97.0 fL Final          Sodium Sodium   Date Value Ref Range Status   09/02/2022 134 (L) 136 - 145 mmol/L Final   09/01/2022 137 136 - 145 mmol/L Final   08/31/2022 139 136 - 145 mmol/L Final      Potassium Potassium   Date Value Ref Range " Status   09/02/2022 4.5 3.5 - 5.2 mmol/L Final   09/01/2022 3.9 3.5 - 5.2 mmol/L Final   08/31/2022 4.0 3.5 - 5.2 mmol/L Final      Chloride Chloride   Date Value Ref Range Status   09/02/2022 100 98 - 107 mmol/L Final   09/01/2022 104 98 - 107 mmol/L Final   08/31/2022 100 98 - 107 mmol/L Final      CO2 CO2   Date Value Ref Range Status   09/02/2022 26.0 22.0 - 29.0 mmol/L Final   09/01/2022 24.0 22.0 - 29.0 mmol/L Final   08/31/2022 28.0 22.0 - 29.0 mmol/L Final      BUN BUN   Date Value Ref Range Status   09/02/2022 41 (H) 8 - 23 mg/dL Final   09/01/2022 28 (H) 8 - 23 mg/dL Final   08/31/2022 50 (H) 8 - 23 mg/dL Final      Creatinine Creatinine   Date Value Ref Range Status   09/02/2022 3.30 (H) 0.57 - 1.00 mg/dL Final   09/01/2022 2.66 (H) 0.57 - 1.00 mg/dL Final   08/31/2022 3.56 (H) 0.57 - 1.00 mg/dL Final      Calcium Calcium   Date Value Ref Range Status   09/02/2022 8.4 (L) 8.6 - 10.5 mg/dL Final   09/01/2022 7.9 (L) 8.6 - 10.5 mg/dL Final   08/31/2022 8.9 8.6 - 10.5 mg/dL Final      PO4 No results found for: CAPO4   Albumin No results found for: ALBUMIN   Magnesium No results found for: MG   Uric Acid No results found for: URICACID        Results Review:     I reviewed the patient's new clinical results.    amLODIPine, 10 mg, Oral, Daily  atenolol, 50 mg, Oral, Daily  atorvastatin, 40 mg, Oral, Nightly  baclofen, 5 mg, Oral, Q12H  insulin lispro, 0-14 Units, Subcutaneous, 4x Daily With Meals & Nightly  levETIRAcetam, 500 mg, Oral, Q12H  lidocaine, 1 patch, Transdermal, Q24H  polyethylene glycol, 17 g, Oral, Daily  senna-docusate sodium, 1 tablet, Oral, BID  sodium chloride, 10 mL, Intravenous, Q12H      Medication Review: Reviewed    Assessment & Plan     1) ESRD  2) Anemia  3) Metabolic acidosis  4) HTN  5) DM  6) Fluid overload/Pulmonary edema  7) Hypernatremia     Plan: Seen on HD.  Continue MWF HD.  Noted plans for rehab.  OK for D/C to [facility] after permcath placement today.      Josh Díaz  MD Betty   [facility]  09/02/22  09:06 EDT

## 2022-09-02 NOTE — NURSING NOTE
per MD pt medically stable to d/c today. Pt does not qualify for ambulance d/t being able to get up and transfer with assistance. Called family to see if they can get her- daughter Owen states they only have one car ([employer]) and she cannot come get pt until tomorrow before 1130 or after 1400. MD aware. States shes medically stable and to use orders from today in the morning.

## 2022-09-02 NOTE — NURSING NOTE
Luanne applied dressing.  Bio disk, retention sutures and sorbaview.  Dermabond to both neck sites. (old shiley, perm cath site).

## 2022-09-02 NOTE — NURSING NOTE
Pt brought into cath lab D.  Full move from bed to table in supine position.  Pt placed on 3L NC. Cardiac monitor and NS at KVO.

## 2022-09-02 NOTE — CASE MANAGEMENT/SOCIAL WORK
Continued Stay Note  MICHAEL Peres     Patient Name: Abiola Mccurdy  MRN: [id number]  Today's Date: 9/2/2022    Admit Date: 8/22/2022     Discharge Plan     Row Name 09/02/22 1335       Plan    Plan DC plan: Accepted to [facility]. Bed available 9/2. No precert required. PASRR per facility. New outpatient HD with [facility] (through [facility]).    Patient/Family in Agreement with Plan yes    Plan Comments Discharge orders noted. CM confirmed with facility liaison Mitzi that pt has a bed ready today 9/2 and will notify [facility] in [place]. Updated pharmacy to [facility]. Pt ELIUD but provided signed IMM copy at bedside. Later called pt over the phone and discussed letter and possible dc.              Expected Discharge Date and Time     Expected Discharge Date Expected Discharge Time    Sep 2, 2022          Patient Forms     Row Name 09/02/22 1333       Patient Forms    Important Message from Medicare (IMM) Delivered  Signed IMM copy per CM    Delivered to Patient    Method of delivery In person              Met with patient in room wearing PPE: mask, face shield/goggles.      Maintained distance greater than six feet and spent less than 15 minutes in the room.      Megan Naegele, RN      Office Phone: [phone]  Office Cell: [phone]

## 2022-09-02 NOTE — DISCHARGE SUMMARY
[facility]  DISCHARGE SUMMARY    Patient Name: Abiola Mccurdy  : 1945  MRN: [id number]    Date of Admission: 2022  Date of Discharge: 2022  Primary Care Physician: Rashawn Garibay MD      Presenting Problem:   Neck pain [M54.2]  Acute-on-chronic kidney injury (HCC) [N17.9, N18.9]  Urinary tract infection without hematuria, site unspecified [N39.0]  Acute renal failure superimposed on chronic kidney disease, unspecified CKD stage, unspecified acute renal failure type (HCC) [N17.9, N18.9]    Active and Resolved Hospital Problems:  Active Hospital Problems    Diagnosis POA   • **Acute-on-chronic kidney injury (HCC) [N17.9, N18.9] Yes   • Current severe episode of major depressive disorder without psychotic features without prior episode (HCC) [F32.2] Yes   • Acute renal failure superimposed on chronic kidney disease, unspecified CKD stage, unspecified acute renal failure type (HCC) [N17.9, N18.9] Yes   • Neck pain on right side [M54.2] Yes   • Chronic kidney disease, stage 3 (moderate) [N18.30] Yes   • Diabetic peripheral neuropathy (HCC) [E11.42] Yes   • Mixed hyperlipidemia [E78.2] Yes   • Type 2 diabetes mellitus with hyperglycemia, without long-term current use of insulin (HCC) [E11.65] Yes   • Essential hypertension [I10] Yes      Resolved Hospital Problems   No resolved problems to display.         Hospital Course     Hospital Course:  Abiola Mccurdy is a 77 y.o. female with past medical history significant for T2DM with peripheral neuropathy, hypertension, HLD, morbid obesity, BMI 43, DJD, CKD, stage III, anemia, suspected underlying depression/psychiatric issues, and chronic low back pain.  According to admission note, she presented to [facility] ED on 2022 via EMS with complaints of 2-week history of squeezing right-sided neck pain that is exacerbated by movement.   She denies any fall, no trauma or  injury.  Initial laboratory in ED with  finding of metabolic acidosis in the setting of SHERRILL/CKD, stage III.  X-ray of the cervical spine (8/22) showed no acute bony abnormalities or significant DJD.  Urine analysis with findings suggestive of UTI.    Patient was seen and consultation by nephrologist and PT/OT with recommendation.  She is admitted with right-sided neck pain, suspected UTI, and SHERRILL versus CKD with metabolic acidosis.    Nephrology concern for possible nephritis versus progression of chronic renal disease versus AIN.  Renal biopsy to be done as outpatient.  Patient eventually progressed to ESRD and started on dialysis as noted below.  She had issues with metabolic encephalopathy and was seen by neuro and psych.  Her mental status has cleared up significantly and is at baseline now after starting dialysis.  She is alert and oriented x3 and follows all commands and is at baseline.  PT/OT recommending rehab.  Patient is stable to discharge to rehab today with follow-up with PCP and nephrology as an outpatient.      A/P:     SHERRILL with progression to ESRD          -renal ultrasound (6/23), with finding consistent with chronic renal disease and no evidence of hydronephrosis  -Possible progression of CKD versus GN versus AIN  -Nephrology following, plan for renal biopsy as outpatient  -Patient became more uremic, temporary dialysis cath placed on 8/30, started on dialysis as of 8/31  -Tunneled dialysis catheter ordered per nephrology, stable to go to rehab once the catheter is done.     Acute toxic metabolic encephalopathy, resolved  Possible depression  -Improved, could be multifactorial with underlying UTI, possible seizure related per neurology, on Keppra per neurology, neurology signed off  -May be underlying depression, psych following  -Patient at baseline, awake and alert     Acute on chronic HFrEF/Volume overload          -in setting of CKD, stage III        -chest x-ray showed cardiomegaly with pulmonary vascular congestion         -2D echo  (8/26) showed depressed LV function, with EF of 33%         -Diuresis as tolerated with ultrafiltration, nephrology following     Right-sided neck pain         -Resolved       -X-ray of the C-spine/MRI of the neck is unrevealing        -supportive care, pain control,     Anemia of CKD      -Continue iron supplements, EPO per nephrology     Probable UTI        -denies dysuria, or urinary frequency     Hypertension      -Norvasc/atenolol     T2DM      fairly controlled, most recent A1c of 7.0 (8/24)     Morbid obesity, BMI of 43       - lifestyle changes with weight reduction, decrease caloric intake and exercise as tolerated     HLD     -Lipitor     Generalized weakness/deconditioning        -PT/OT.  PT to reassess for possible placement to rehab or SNF    DISCHARGE Follow Up Recommendations for labs and diagnostics:   Follow-up with PCP and nephrology    Reasons For Change In Medications and Indications for New Medications:  Medication changes as below    Day of Discharge     Vital Signs:  Temp:  [98 °F (36.7 °C)-98.6 °F (37 °C)] 98.2 °F (36.8 °C)  Heart Rate:  [50-64] 50  Resp:  [12-21] 18  BP: (100-153)/(37-75) 135/64    Physical Exam:  General: Awake, alert, NAD  Eyes: PERRL, EOMI, conjunctivae are clear  Cardiovascular: Regular rate and rhythm, no murmurs  Respiratory: Clear to auscultation bilaterally, no wheezing or rales, unlabored breathing  Abdomen: Soft, nontender, positive bowel sounds, no guarding  Neurologic: A&O, CN grossly intact, moves all extremities spontaneously  Musculoskeletal: Generalized weakness, no deformities  Skin: Warm, dry, intact        Pertinent  and/or Most Recent Results     LAB RESULTS:      Lab 09/02/22  0406 09/01/22  0318 08/31/22  1316 08/30/22  0107 08/29/22  0803 08/28/22  2336 08/26/22  2120 08/26/22  1938   WBC 11.90* 11.60* 13.00* 12.30*  --  9.80   < >  --    HEMOGLOBIN 9.4* 8.9* 9.9* 10.5*  --  9.4*   < >  --    HEMATOCRIT 29.7* 27.8* 32.1* 33.8*  --  29.3*   < >   --    PLATELETS 349 322 394 456*  --  438   < >  --    NEUTROS ABS 8.60* 8.80* 10.00* 10.00*  --  7.60*   < >  --    LYMPHS ABS 2.00 1.50 1.70 1.20  --  1.10   < >  --    MONOS ABS 1.00* 0.80 0.90 0.90  --  0.90   < >  --    EOS ABS 0.40 0.50* 0.50* 0.10  --  0.10   < >  --    MCV 82.6 83.1 84.1 83.7  --  83.0   < >  --    LACTATE  --   --   --   --   --   --   --  0.8   PROTIME  --   --   --   --  10.7  --   --   --    APTT  --   --   --   --  28.9  --   --   --     < > = values in this interval not displayed.         Lab 09/02/22  0406 09/01/22  0318 08/31/22  0813 08/30/22  0107 08/28/22  2336 08/27/22  2352 08/26/22  2120   SODIUM 134* 137 139 150* 146*   < > 142   POTASSIUM 4.5 3.9 4.0 3.8 3.8   < > 4.3   CHLORIDE 100 104 100 107 108*   < > 109*   CO2 26.0 24.0 28.0 29.0 27.0   < > 19.0*   ANION GAP 8.0 9.0 11.0 14.0 11.0   < > 14.0   BUN 41* 28* 50* 47* 44*   < > 43*   CREATININE 3.30* 2.66* 3.56* 3.34* 3.30*   < > 3.38*   EGFR 14.1* 18.2* 12.8* 13.8* 14.1*   < > 13.7*   GLUCOSE 145* 210* 240* 223* 283*   < > 162*   CALCIUM 8.4* 7.9* 8.9 9.1 8.7   < > 9.1   TSH  --   --   --   --   --   --  1.160    < > = values in this interval not displayed.         Lab 08/26/22 2056   TOTAL PROTEIN 6.9   ALBUMIN 2.80*   GLOBULIN 4.1   ALT (SGPT) 5   AST (SGOT) 17   BILIRUBIN 0.2   ALK PHOS 75         Lab 08/29/22  0803   PROTIME 10.7   INR 1.04         Lab 08/26/22  2120   CHOLESTEROL 122   LDL CHOL 57   HDL CHOL 46   TRIGLYCERIDES 104         Lab 08/27/22  1747   FOLATE 5.59   VITAMIN B 12 >2,000*         Lab 08/26/22  2118 08/26/22  1255   PH, ARTERIAL 7.418 7.381   PCO2, ARTERIAL 33.1* 35.5   PO2 ART 81.6* 80.0*   O2 SATURATION ART 96.3 95.6   FIO2 21 21   HCO3 ART 21.4 21.0   BASE EXCESS ART -2.6* -3.6*     Brief Urine Lab Results  (Last result in the past 365 days)      Color   Clarity   Blood   Leuk Est   Nitrite   Protein   CREAT   Urine HCG        08/28/22 1039             40.2         08/28/22 1039 Yellow    Cloudy   Small (1+)   Large (3+)   Negative   100 mg/dL (2+)               Microbiology Results (last 10 days)     Procedure Component Value - Date/Time    Eosinophil Smear - Urine, Urine, Clean Catch [[id number]]  (Normal) Collected: 08/28/22 1039    Lab Status: Final result Specimen: Urine, Clean Catch Updated: 08/28/22 1218     Eosinophil Smear 0 % EOS/100 Cells     Urine Culture - Urine, Urine, Clean Catch [[id number]]  (Normal) Collected: 08/25/22 1451    Lab Status: Final result Specimen: Urine, Clean Catch Updated: 08/26/22 2025     Urine Culture No growth          XR Spine Cervical Complete 4 or 5 View    Result Date: 8/22/2022  Impression:  1.  No acute bony abnormality or significant degenerative change of the cervical spine.  Electronically Signed By-Jericho Newman MD On:8/22/2022 5:36 PM This report was finalized on [id number] by  Jericho Newman MD.    MRI Angiogram Head Without Contrast    Result Date: 8/27/2022  Impression: IMPRESSION :  1. Small focal area of severe stenosis in the proximal left posterior cerebral artery is consistent with an atherosclerotic plaque  Electronically Signed By-Mark Nguyen MD On:8/27/2022 9:19 PM This report was finalized on [id number] by  Mark Nguyen MD.    MRI Brain Without Contrast    Result Date: 8/27/2022  Impression:  1. Small tiny areas of signal abnormality in the periventricular white matter in the frontoparietal lobes bilaterally on the axial FLAIR images of uncertain etiology most likely representing microangiopathy and old lacunar infarctions and less likely representing demyelinating disease. Clinical correlation would BE helpful.  Electronically Signed By-Mark Nguyen MD On:8/27/2022 9:15 PM This report was finalized on [id number] by  Mark Nguyen MD.    MRI Neck Soft Tissue Only Without Contrast    Result Date: 8/25/2022  Impression: IMPRESSION : Limited quality study with the exam quality being severely degraded  due to patient motion artifact.  No large mass lesion or fluid collection seen along the right side of the neck.  If there is high clinical suspicion for true neck mass, consider CT scan of the neck with IV contrast.  Electronically Signed By-Jericho Newman MD On:8/25/2022 6:10 PM This report was finalized on [id number] by  Jericho Newman MD.    XR Chest 1 View    Result Date: 8/26/2022  Impression:  1. Cardiomegaly with pulmonary vascular congestion and interstitial haziness may indicate early pulmonary edema or less likely atypical infection pattern.  Electronically Signed By-Hawk Pedraza MD On:8/26/2022 8:40 AM This report was finalized on [id number] by  Hawk Pedraza MD.    US Renal Bilateral    Result Date: 8/23/2022  Impression:  1.  Thin echogenic renal parenchyma bilaterally compatible changes of medical renal disease.  No evidence of hydronephrosis.  Electronically Signed By-Jericho Newman MD On:8/23/2022 7:28 PM This report was finalized on [id number] by  Jericho Newman MD.    CT Head Without Contrast Stroke Protocol    Result Date: 8/26/2022  Impression:  Mild generalized parenchymal volume loss.  No acute intracranial abnormality.  Findings were personally called to the patient's nurse at 8:42 PM on 08/26/2022.  Electronically Signed By-Jericho Newman MD On:8/26/2022 8:42 PM This report was finalized on [id number] by  Jericho Newman MD.    XR Abdomen KUB    Result Date: 8/30/2022  Impression: Large amount stool overlying the rectum suggesting constipation and fecal impaction.  Electronically Signed By-Graeme Cordero MD On:8/30/2022 11:54 AM This report was finalized on [id number] by  Graeme Cordero MD.    IR insert non-tunneled central line 5+    Result Date: 8/30/2022  Impression:  1. Successful placement of right IJ temporary dialysis catheter.  Electronically Signed By-Mane Hensley MD On:8/30/2022 2:19 PM This report was finalized on [id number] by  Mane Hensley MD.      Results  for orders placed during the hospital encounter of 08/22/22    Duplex Carotid Ultrasound CAR    Interpretation Summary  · Proximal right internal carotid artery mild stenosis.      Results for orders placed during the hospital encounter of 08/22/22    Duplex Carotid Ultrasound CAR    Interpretation Summary  · Proximal right internal carotid artery mild stenosis.      Results for orders placed during the hospital encounter of 08/22/22    Adult Transthoracic Echo Complete W/ Cont if Necessary Per Protocol    Interpretation Summary  · Calculated left ventricular EF = 55% Estimated left ventricular EF was in agreement with the calculated left ventricular EF.  · Left ventricular diastolic function is consistent with (grade II w/high LAP) pseudonormalization.  · Estimated right ventricular systolic pressure from tricuspid regurgitation is normal (<35 mmHg).  · There is a small (<1cm) circumferential pericardial effusion.      Labs Pending at Discharge:      Procedures Performed           Consults:   Consults     Date and Time Order Name Status Description    8/27/2022  2:30 PM Inpatient Psychiatrist Consult Completed     8/26/2022  8:31 PM Inpatient Neurology Consult Stroke Completed     8/23/2022  4:28 PM Inpatient Hospitalist Consult Completed     8/23/2022 10:02 AM Inpatient Nephrology Consult Completed             Discharge Details        Discharge Medications      New Medications      Instructions Start Date   Baclofen 5 MG tablet   5 mg, Oral, Every 12 Hours Scheduled      bisacodyl 10 MG suppository  Commonly known as: DULCOLAX   10 mg, Rectal, Daily PRN      levETIRAcetam 500 MG tablet  Commonly known as: KEPPRA   500 mg, Oral, Every 12 Hours Scheduled      polyethylene glycol 17 g packet  Commonly known as: MIRALAX   17 g, Oral, Daily   Start Date: September 3, 2022     sennosides-docusate 8.6-50 MG per tablet  Commonly known as: PERICOLACE   1 tablet, Oral, 2 Times Daily         Changes to Medications       Instructions Start Date   atenolol 50 MG tablet  Commonly known as: TENORMIN  What changed:   · medication strength  · how much to take   50 mg, Oral, Daily   Start Date: September 3, 2022        Continue These Medications      Instructions Start Date   acetaminophen 325 MG tablet  Commonly known as: TYLENOL   2 tablets, Oral, Daily PRN      amLODIPine 10 MG tablet  Commonly known as: NORVASC   10 mg, Oral, Daily      Aspirin Adult Low Strength 81 MG EC tablet  Generic drug: aspirin   TAKE ONE TABLET BY MOUTH EVERY DAY      atorvastatin 20 MG tablet  Commonly known as: LIPITOR   20 mg, Oral, Every Night at Bedtime      latanoprost 0.005 % ophthalmic solution  Commonly known as: XALATAN   INSTILL 1 DROP INTO THE AFFECTED EYE(S) IN THE EVENING      SITagliptin 25 MG tablet  Commonly known as: Januvia   25 mg, Oral, Daily         Stop These Medications    cycloSPORINE 0.05 % ophthalmic emulsion  Commonly known as: RESTASIS            No Known Allergies      Discharge Disposition:  Skilled Nursing Facility ([place] - External)    Diet:  Hospital:  Diet Order   Procedures   • NPO Diet NPO Type: Sips with Meds         Discharge Activity:         CODE STATUS:  Code Status and Medical Interventions:   Ordered at: 08/23/22 0634     Code Status (Patient has no pulse and is not breathing):    CPR (Attempt to Resuscitate)     Medical Interventions (Patient has pulse or is breathing):    Full Support     Release to patient:    Routine Release         Future Appointments   Date Time Provider Department Center   11/17/2022  1:15 PM Lebron Garibay MD [facility]       Additional Instructions for the Follow-ups that You Need to Schedule     Ambulatory Referral to Home Health (Hospital)   As directed      Face to Face Visit Date: 8/25/2022    Follow-up provider for Plan of Care?: I treated the patient in an acute care facility and will not continue treatment after discharge.    Follow-up provider: LEBRON GARIBAY [[id number]]     Reason/Clinical Findings: med managent/weakness    Describe mobility limitations that make leaving home difficult: weakness    Nursing/Therapeutic Services Requested: Home Monitoring (Select INITIATE PROTOCOL order from panel below)    Frequency: 1 Week 1               Time spent on Discharge including face to face service:  35 minutes    Signature:    Electronically signed by Amelia Wolfe DO, 09/02/22, 11:46 AM EDT.

## 2022-09-02 NOTE — PLAN OF CARE
Goal Outcome Evaluation:              Outcome Evaluation: pt transfer from [facility]. Pt resting well thoughout shift with no complaints. Plans for a tunnel cath and will discharge to [facility] when ready. Continue to monitor.

## 2022-09-02 NOTE — NURSING NOTE
[facility] D[facility]    HD completed today  Pt did c/o cramping with tx, fluid pull was stopped and pt felt better  Access worked without issues  Removed 1kg

## 2022-09-03 VITALS
TEMPERATURE: 99.8 F | HEART RATE: 60 BPM | SYSTOLIC BLOOD PRESSURE: 131 MMHG | WEIGHT: 246.25 LBS | RESPIRATION RATE: 18 BRPM | DIASTOLIC BLOOD PRESSURE: 69 MMHG | BODY MASS INDEX: 38.65 KG/M2 | OXYGEN SATURATION: 98 % | HEIGHT: 67 IN

## 2022-09-03 LAB — GLUCOSE BLDC GLUCOMTR-MCNC: 142 MG/DL (ref 70–105)

## 2022-09-03 PROCEDURE — 82962 GLUCOSE BLOOD TEST: CPT

## 2022-09-03 RX ADMIN — ACETAMINOPHEN 650 MG: 325 TABLET, FILM COATED ORAL at 08:33

## 2022-09-03 RX ADMIN — LEVETIRACETAM 500 MG: 500 TABLET ORAL at 08:30

## 2022-09-03 RX ADMIN — AMLODIPINE BESYLATE 10 MG: 5 TABLET ORAL at 08:32

## 2022-09-03 RX ADMIN — ATENOLOL 50 MG: 50 TABLET ORAL at 08:32

## 2022-09-03 RX ADMIN — Medication 10 ML: at 08:27

## 2022-09-03 RX ADMIN — BACLOFEN 5 MG: 10 TABLET ORAL at 08:30

## 2022-09-03 RX ADMIN — LIDOCAINE 1 PATCH: 50 PATCH CUTANEOUS at 08:26

## 2022-09-06 NOTE — CASE MANAGEMENT/SOCIAL WORK
Case Management Discharge Note      Final Note: [facility].         Selected Continued Care - Discharged on 9/3/2022 Admission date: 8/22/2022 - Discharge disposition: Skilled Nursing Facility ([place] - External)    Destination Coordination complete.    Service Provider Selected Services Address Phone Fax Patient Preferred    [facility] [place]  Skilled Nursing [address] - [place] [address] [phone] --           Transportation Services  Private: Car    Final Discharge Disposition Code: 03 - skilled nursing facility (SNF)

## 2022-09-16 ENCOUNTER — APPOINTMENT (OUTPATIENT)
Dept: GENERAL RADIOLOGY | Facility: HOSPITAL | Age: 77
End: 2022-09-16

## 2022-09-16 ENCOUNTER — APPOINTMENT (OUTPATIENT)
Dept: CT IMAGING | Facility: HOSPITAL | Age: 77
End: 2022-09-16

## 2022-09-16 ENCOUNTER — ANESTHESIA EVENT (OUTPATIENT)
Dept: GASTROENTEROLOGY | Facility: HOSPITAL | Age: 77
End: 2022-09-16

## 2022-09-16 ENCOUNTER — ANESTHESIA (OUTPATIENT)
Dept: GASTROENTEROLOGY | Facility: HOSPITAL | Age: 77
End: 2022-09-16

## 2022-09-16 ENCOUNTER — INPATIENT HOSPITAL (OUTPATIENT)
Dept: URBAN - METROPOLITAN AREA HOSPITAL 84 | Facility: HOSPITAL | Age: 77
End: 2022-09-16

## 2022-09-16 ENCOUNTER — HOSPITAL ENCOUNTER (INPATIENT)
Facility: HOSPITAL | Age: 77
LOS: 9 days | Discharge: HOSPICE | End: 2022-09-25
Attending: EMERGENCY MEDICINE

## 2022-09-16 VITALS — SYSTOLIC BLOOD PRESSURE: 105 MMHG | OXYGEN SATURATION: 100 % | DIASTOLIC BLOOD PRESSURE: 47 MMHG | HEART RATE: 58 BPM

## 2022-09-16 DIAGNOSIS — B96.81 HELICOBACTER PYLORI [H. PYLORI] AS THE CAUSE OF DISEASES CLA: ICD-10-CM

## 2022-09-16 DIAGNOSIS — K31.A0 GASTRIC INTESTINAL METAPLASIA, UNSPECIFIED: ICD-10-CM

## 2022-09-16 DIAGNOSIS — K92.0 HEMATEMESIS: ICD-10-CM

## 2022-09-16 DIAGNOSIS — R93.3 ABNORMAL FINDINGS ON DIAGNOSTIC IMAGING OF OTHER PARTS OF DI: ICD-10-CM

## 2022-09-16 DIAGNOSIS — R77.8 ELEVATED TROPONIN: ICD-10-CM

## 2022-09-16 DIAGNOSIS — R11.2 NAUSEA AND VOMITING, UNSPECIFIED VOMITING TYPE: ICD-10-CM

## 2022-09-16 DIAGNOSIS — K20.90 ESOPHAGITIS: Primary | ICD-10-CM

## 2022-09-16 DIAGNOSIS — K20.80 OTHER ESOPHAGITIS WITHOUT BLEEDING: ICD-10-CM

## 2022-09-16 DIAGNOSIS — R10.84 GENERALIZED ABDOMINAL PAIN: ICD-10-CM

## 2022-09-16 DIAGNOSIS — K44.9 DIAPHRAGMATIC HERNIA WITHOUT OBSTRUCTION OR GANGRENE: ICD-10-CM

## 2022-09-16 DIAGNOSIS — R07.9 CHEST PAIN, UNSPECIFIED TYPE: ICD-10-CM

## 2022-09-16 DIAGNOSIS — K29.50 UNSPECIFIED CHRONIC GASTRITIS WITHOUT BLEEDING: ICD-10-CM

## 2022-09-16 LAB
ABO GROUP BLD: NORMAL
ALBUMIN SERPL-MCNC: 3.6 G/DL (ref 3.5–5.2)
ALBUMIN/GLOB SERPL: 0.7 G/DL
ALP SERPL-CCNC: 96 U/L (ref 39–117)
ALT SERPL W P-5'-P-CCNC: <5 U/L (ref 1–33)
AMORPH URATE CRY URNS QL MICRO: ABNORMAL /HPF
ANION GAP SERPL CALCULATED.3IONS-SCNC: 27 MMOL/L (ref 5–15)
APTT PPP: 25.3 SECONDS (ref 24–31)
AST SERPL-CCNC: 13 U/L (ref 1–32)
B PARAPERT DNA SPEC QL NAA+PROBE: NOT DETECTED
B PERT DNA SPEC QL NAA+PROBE: NOT DETECTED
BACTERIA UR QL AUTO: ABNORMAL /HPF
BASOPHILS # BLD AUTO: 0 10*3/MM3 (ref 0–0.2)
BASOPHILS NFR BLD AUTO: 0.2 % (ref 0–1.5)
BILIRUB SERPL-MCNC: 0.3 MG/DL (ref 0–1.2)
BILIRUB UR QL STRIP: ABNORMAL
BLD GP AB SCN SERPL QL: NEGATIVE
BUN SERPL-MCNC: 41 MG/DL (ref 8–23)
BUN/CREAT SERPL: 5.6 (ref 7–25)
C PNEUM DNA NPH QL NAA+NON-PROBE: NOT DETECTED
CALCIUM SPEC-SCNC: 9.7 MG/DL (ref 8.6–10.5)
CHLORIDE SERPL-SCNC: 90 MMOL/L (ref 98–107)
CLARITY UR: ABNORMAL
CO2 SERPL-SCNC: 23 MMOL/L (ref 22–29)
COLOR UR: ABNORMAL
CREAT SERPL-MCNC: 7.28 MG/DL (ref 0.57–1)
D-LACTATE SERPL-SCNC: 2.4 MMOL/L (ref 0.5–2)
DEPRECATED RDW RBC AUTO: 45.9 FL (ref 37–54)
EGFRCR SERPLBLD CKD-EPI 2021: 5.4 ML/MIN/1.73
EOSINOPHIL # BLD AUTO: 0 10*3/MM3 (ref 0–0.4)
EOSINOPHIL NFR BLD AUTO: 0.1 % (ref 0.3–6.2)
ERYTHROCYTE [DISTWIDTH] IN BLOOD BY AUTOMATED COUNT: 15.5 % (ref 12.3–15.4)
FLUAV SUBTYP SPEC NAA+PROBE: NOT DETECTED
FLUBV RNA ISLT QL NAA+PROBE: NOT DETECTED
GLOBULIN UR ELPH-MCNC: 4.9 GM/DL
GLUCOSE BLDC GLUCOMTR-MCNC: 149 MG/DL (ref 70–105)
GLUCOSE BLDC GLUCOMTR-MCNC: 192 MG/DL (ref 70–105)
GLUCOSE SERPL-MCNC: 192 MG/DL (ref 65–99)
GLUCOSE UR STRIP-MCNC: ABNORMAL MG/DL
HADV DNA SPEC NAA+PROBE: NOT DETECTED
HCOV 229E RNA SPEC QL NAA+PROBE: NOT DETECTED
HCOV HKU1 RNA SPEC QL NAA+PROBE: NOT DETECTED
HCOV NL63 RNA SPEC QL NAA+PROBE: NOT DETECTED
HCOV OC43 RNA SPEC QL NAA+PROBE: NOT DETECTED
HCT VFR BLD AUTO: 35.4 % (ref 34–46.6)
HGB BLD-MCNC: 11.5 G/DL (ref 12–15.9)
HGB UR QL STRIP.AUTO: ABNORMAL
HMPV RNA NPH QL NAA+NON-PROBE: NOT DETECTED
HPIV1 RNA ISLT QL NAA+PROBE: NOT DETECTED
HPIV2 RNA SPEC QL NAA+PROBE: NOT DETECTED
HPIV3 RNA NPH QL NAA+PROBE: NOT DETECTED
HPIV4 P GENE NPH QL NAA+PROBE: NOT DETECTED
HYALINE CASTS UR QL AUTO: ABNORMAL /LPF
INR PPP: 1.03 (ref 0.93–1.1)
KETONES UR QL STRIP: ABNORMAL
LEUKOCYTE ESTERASE UR QL STRIP.AUTO: ABNORMAL
LIPASE SERPL-CCNC: 34 U/L (ref 13–60)
LYMPHOCYTES # BLD AUTO: 1.1 10*3/MM3 (ref 0.7–3.1)
LYMPHOCYTES NFR BLD AUTO: 7.8 % (ref 19.6–45.3)
M PNEUMO IGG SER IA-ACNC: NOT DETECTED
MCH RBC QN AUTO: 26.9 PG (ref 26.6–33)
MCHC RBC AUTO-ENTMCNC: 32.6 G/DL (ref 31.5–35.7)
MCV RBC AUTO: 82.5 FL (ref 79–97)
MONOCYTES # BLD AUTO: 0.5 10*3/MM3 (ref 0.1–0.9)
MONOCYTES NFR BLD AUTO: 3.9 % (ref 5–12)
NEUTROPHILS NFR BLD AUTO: 12.3 10*3/MM3 (ref 1.7–7)
NEUTROPHILS NFR BLD AUTO: 88 % (ref 42.7–76)
NITRITE UR QL STRIP: NEGATIVE
NRBC BLD AUTO-RTO: 0 /100 WBC (ref 0–0.2)
PH UR STRIP.AUTO: 5.5 [PH] (ref 5–8)
PLATELET # BLD AUTO: 488 10*3/MM3 (ref 140–450)
PMV BLD AUTO: 8.4 FL (ref 6–12)
POTASSIUM SERPL-SCNC: 3.8 MMOL/L (ref 3.5–5.2)
PROCALCITONIN SERPL-MCNC: 0.31 NG/ML (ref 0–0.25)
PROT SERPL-MCNC: 8.5 G/DL (ref 6–8.5)
PROT UR QL STRIP: ABNORMAL
PROTHROMBIN TIME: 10.6 SECONDS (ref 9.6–11.7)
RBC # BLD AUTO: 4.29 10*6/MM3 (ref 3.77–5.28)
RBC # UR STRIP: ABNORMAL /HPF
REF LAB TEST METHOD: ABNORMAL
RH BLD: POSITIVE
RHINOVIRUS RNA SPEC NAA+PROBE: NOT DETECTED
RSV RNA NPH QL NAA+NON-PROBE: NOT DETECTED
SARS-COV-2 RNA NPH QL NAA+NON-PROBE: NOT DETECTED
SODIUM SERPL-SCNC: 140 MMOL/L (ref 136–145)
SP GR UR STRIP: 1.02 (ref 1–1.03)
SQUAMOUS #/AREA URNS HPF: ABNORMAL /HPF
T&S EXPIRATION DATE: NORMAL
TROPONIN T SERPL-MCNC: 0.06 NG/ML (ref 0–0.03)
TROPONIN T SERPL-MCNC: 0.07 NG/ML (ref 0–0.03)
UROBILINOGEN UR QL STRIP: ABNORMAL
WBC # UR STRIP: ABNORMAL /HPF
WBC NRBC COR # BLD: 13.9 10*3/MM3 (ref 3.4–10.8)

## 2022-09-16 PROCEDURE — 82962 GLUCOSE BLOOD TEST: CPT

## 2022-09-16 PROCEDURE — 86901 BLOOD TYPING SEROLOGIC RH(D): CPT | Performed by: PHYSICIAN ASSISTANT

## 2022-09-16 PROCEDURE — 88305 TISSUE EXAM BY PATHOLOGIST: CPT | Performed by: INTERNAL MEDICINE

## 2022-09-16 PROCEDURE — 36415 COLL VENOUS BLD VENIPUNCTURE: CPT | Performed by: EMERGENCY MEDICINE

## 2022-09-16 PROCEDURE — 87040 BLOOD CULTURE FOR BACTERIA: CPT | Performed by: PHYSICIAN ASSISTANT

## 2022-09-16 PROCEDURE — 0DB68ZX EXCISION OF STOMACH, VIA NATURAL OR ARTIFICIAL OPENING ENDOSCOPIC, DIAGNOSTIC: ICD-10-PCS | Performed by: INTERNAL MEDICINE

## 2022-09-16 PROCEDURE — 85025 COMPLETE CBC W/AUTO DIFF WBC: CPT | Performed by: INTERNAL MEDICINE

## 2022-09-16 PROCEDURE — 86850 RBC ANTIBODY SCREEN: CPT | Performed by: PHYSICIAN ASSISTANT

## 2022-09-16 PROCEDURE — 80053 COMPREHEN METABOLIC PANEL: CPT | Performed by: PHYSICIAN ASSISTANT

## 2022-09-16 PROCEDURE — 74176 CT ABD & PELVIS W/O CONTRAST: CPT

## 2022-09-16 PROCEDURE — 0202U NFCT DS 22 TRGT SARS-COV-2: CPT | Performed by: PHYSICIAN ASSISTANT

## 2022-09-16 PROCEDURE — 25010000002 ONDANSETRON PER 1 MG: Performed by: INTERNAL MEDICINE

## 2022-09-16 PROCEDURE — 85025 COMPLETE CBC W/AUTO DIFF WBC: CPT | Performed by: PHYSICIAN ASSISTANT

## 2022-09-16 PROCEDURE — 85610 PROTHROMBIN TIME: CPT | Performed by: PHYSICIAN ASSISTANT

## 2022-09-16 PROCEDURE — 86900 BLOOD TYPING SEROLOGIC ABO: CPT

## 2022-09-16 PROCEDURE — 84484 ASSAY OF TROPONIN QUANT: CPT | Performed by: PHYSICIAN ASSISTANT

## 2022-09-16 PROCEDURE — 85730 THROMBOPLASTIN TIME PARTIAL: CPT | Performed by: PHYSICIAN ASSISTANT

## 2022-09-16 PROCEDURE — 83690 ASSAY OF LIPASE: CPT | Performed by: PHYSICIAN ASSISTANT

## 2022-09-16 PROCEDURE — 81001 URINALYSIS AUTO W/SCOPE: CPT | Performed by: PHYSICIAN ASSISTANT

## 2022-09-16 PROCEDURE — 43239 EGD BIOPSY SINGLE/MULTIPLE: CPT | Performed by: INTERNAL MEDICINE

## 2022-09-16 PROCEDURE — 84484 ASSAY OF TROPONIN QUANT: CPT | Performed by: INTERNAL MEDICINE

## 2022-09-16 PROCEDURE — 83036 HEMOGLOBIN GLYCOSYLATED A1C: CPT | Performed by: INTERNAL MEDICINE

## 2022-09-16 PROCEDURE — 83605 ASSAY OF LACTIC ACID: CPT

## 2022-09-16 PROCEDURE — 99285 EMERGENCY DEPT VISIT HI MDM: CPT

## 2022-09-16 PROCEDURE — 93005 ELECTROCARDIOGRAM TRACING: CPT | Performed by: PHYSICIAN ASSISTANT

## 2022-09-16 PROCEDURE — 86900 BLOOD TYPING SEROLOGIC ABO: CPT | Performed by: PHYSICIAN ASSISTANT

## 2022-09-16 PROCEDURE — 25010000002 ONDANSETRON PER 1 MG: Performed by: PHYSICIAN ASSISTANT

## 2022-09-16 PROCEDURE — 86901 BLOOD TYPING SEROLOGIC RH(D): CPT

## 2022-09-16 PROCEDURE — 63710000001 ONDANSETRON ODT 4 MG TABLET DISPERSIBLE: Performed by: PHYSICIAN ASSISTANT

## 2022-09-16 PROCEDURE — 25010000002 PROPOFOL 10 MG/ML EMULSION: Performed by: ANESTHESIOLOGY

## 2022-09-16 PROCEDURE — 88342 IMHCHEM/IMCYTCHM 1ST ANTB: CPT | Performed by: INTERNAL MEDICINE

## 2022-09-16 PROCEDURE — 84145 PROCALCITONIN (PCT): CPT | Performed by: PHYSICIAN ASSISTANT

## 2022-09-16 PROCEDURE — 71045 X-RAY EXAM CHEST 1 VIEW: CPT

## 2022-09-16 RX ORDER — LEVETIRACETAM 500 MG/1
500 TABLET ORAL EVERY 12 HOURS SCHEDULED
Status: DISCONTINUED | OUTPATIENT
Start: 2022-09-16 | End: 2022-09-20

## 2022-09-16 RX ORDER — ACETAMINOPHEN 160 MG/5ML
650 SOLUTION ORAL EVERY 4 HOURS PRN
Status: DISCONTINUED | OUTPATIENT
Start: 2022-09-16 | End: 2022-09-25 | Stop reason: HOSPADM

## 2022-09-16 RX ORDER — ONDANSETRON 4 MG/1
4 TABLET, FILM COATED ORAL EVERY 6 HOURS PRN
Status: DISCONTINUED | OUTPATIENT
Start: 2022-09-16 | End: 2022-09-16 | Stop reason: SDUPTHER

## 2022-09-16 RX ORDER — METOCLOPRAMIDE 5 MG/1
5 TABLET ORAL
Status: DISCONTINUED | OUTPATIENT
Start: 2022-09-16 | End: 2022-09-20

## 2022-09-16 RX ORDER — SODIUM CHLORIDE 0.9 % (FLUSH) 0.9 %
10 SYRINGE (ML) INJECTION AS NEEDED
Status: DISCONTINUED | OUTPATIENT
Start: 2022-09-16 | End: 2022-09-25 | Stop reason: HOSPADM

## 2022-09-16 RX ORDER — ASPIRIN 81 MG/1
81 TABLET ORAL DAILY
COMMUNITY
End: 2022-09-28 | Stop reason: HOSPADM

## 2022-09-16 RX ORDER — AMOXICILLIN 250 MG
2 CAPSULE ORAL 2 TIMES DAILY PRN
Status: DISCONTINUED | OUTPATIENT
Start: 2022-09-16 | End: 2022-09-25

## 2022-09-16 RX ORDER — PANTOPRAZOLE SODIUM 40 MG/10ML
80 INJECTION, POWDER, LYOPHILIZED, FOR SOLUTION INTRAVENOUS ONCE
Status: COMPLETED | OUTPATIENT
Start: 2022-09-16 | End: 2022-09-16

## 2022-09-16 RX ORDER — CHOLECALCIFEROL (VITAMIN D3) 125 MCG
5 CAPSULE ORAL NIGHTLY PRN
Status: DISCONTINUED | OUTPATIENT
Start: 2022-09-16 | End: 2022-09-25

## 2022-09-16 RX ORDER — BISACODYL 10 MG
10 SUPPOSITORY, RECTAL RECTAL DAILY PRN
Status: DISCONTINUED | OUTPATIENT
Start: 2022-09-16 | End: 2022-09-25

## 2022-09-16 RX ORDER — LIDOCAINE HYDROCHLORIDE 20 MG/ML
INJECTION, SOLUTION EPIDURAL; INFILTRATION; INTRACAUDAL; PERINEURAL AS NEEDED
Status: DISCONTINUED | OUTPATIENT
Start: 2022-09-16 | End: 2022-09-16 | Stop reason: SURG

## 2022-09-16 RX ORDER — NITROGLYCERIN 0.4 MG/1
0.4 TABLET SUBLINGUAL
Status: DISCONTINUED | OUTPATIENT
Start: 2022-09-16 | End: 2022-09-25

## 2022-09-16 RX ORDER — SODIUM CHLORIDE 0.9 % (FLUSH) 0.9 %
3 SYRINGE (ML) INJECTION EVERY 12 HOURS SCHEDULED
Status: DISCONTINUED | OUTPATIENT
Start: 2022-09-16 | End: 2022-09-25

## 2022-09-16 RX ORDER — INSULIN LISPRO 100 [IU]/ML
INJECTION, SOLUTION INTRAVENOUS; SUBCUTANEOUS SEE ADMIN INSTRUCTIONS
COMMUNITY
End: 2022-09-28 | Stop reason: HOSPADM

## 2022-09-16 RX ORDER — LATANOPROST 50 UG/ML
1 SOLUTION/ DROPS OPHTHALMIC DAILY
Status: DISCONTINUED | OUTPATIENT
Start: 2022-09-16 | End: 2022-09-25

## 2022-09-16 RX ORDER — PROPOFOL 10 MG/ML
VIAL (ML) INTRAVENOUS AS NEEDED
Status: DISCONTINUED | OUTPATIENT
Start: 2022-09-16 | End: 2022-09-16 | Stop reason: SURG

## 2022-09-16 RX ORDER — POLYETHYLENE GLYCOL 3350 17 G/17G
17 POWDER, FOR SOLUTION ORAL DAILY PRN
Status: DISCONTINUED | OUTPATIENT
Start: 2022-09-16 | End: 2022-09-25

## 2022-09-16 RX ORDER — SODIUM CHLORIDE 9 MG/ML
INJECTION, SOLUTION INTRAVENOUS CONTINUOUS PRN
Status: DISCONTINUED | OUTPATIENT
Start: 2022-09-16 | End: 2022-09-16 | Stop reason: SURG

## 2022-09-16 RX ORDER — SODIUM CHLORIDE 0.9 % (FLUSH) 0.9 %
3-10 SYRINGE (ML) INJECTION AS NEEDED
Status: DISCONTINUED | OUTPATIENT
Start: 2022-09-16 | End: 2022-09-25 | Stop reason: HOSPADM

## 2022-09-16 RX ORDER — ONDANSETRON 2 MG/ML
4 INJECTION INTRAMUSCULAR; INTRAVENOUS EVERY 6 HOURS PRN
Status: DISCONTINUED | OUTPATIENT
Start: 2022-09-16 | End: 2022-09-25 | Stop reason: HOSPADM

## 2022-09-16 RX ORDER — BISACODYL 5 MG/1
5 TABLET, DELAYED RELEASE ORAL DAILY PRN
Status: DISCONTINUED | OUTPATIENT
Start: 2022-09-16 | End: 2022-09-25

## 2022-09-16 RX ORDER — AMLODIPINE BESYLATE 5 MG/1
5 TABLET ORAL DAILY
Status: DISCONTINUED | OUTPATIENT
Start: 2022-09-16 | End: 2022-09-22

## 2022-09-16 RX ORDER — ALBUMIN (HUMAN) 12.5 G/50ML
25 SOLUTION INTRAVENOUS AS NEEDED
Status: ACTIVE | OUTPATIENT
Start: 2022-09-17 | End: 2022-09-17

## 2022-09-16 RX ORDER — ONDANSETRON 2 MG/ML
4 INJECTION INTRAMUSCULAR; INTRAVENOUS ONCE
Status: COMPLETED | OUTPATIENT
Start: 2022-09-16 | End: 2022-09-16

## 2022-09-16 RX ORDER — ATENOLOL 25 MG/1
25 TABLET ORAL DAILY
Status: DISCONTINUED | OUTPATIENT
Start: 2022-09-16 | End: 2022-09-23

## 2022-09-16 RX ORDER — ONDANSETRON 4 MG/1
4 TABLET, FILM COATED ORAL EVERY 6 HOURS PRN
Status: DISCONTINUED | OUTPATIENT
Start: 2022-09-16 | End: 2022-09-25

## 2022-09-16 RX ORDER — ACETAMINOPHEN 650 MG/1
650 SUPPOSITORY RECTAL EVERY 4 HOURS PRN
Status: DISCONTINUED | OUTPATIENT
Start: 2022-09-16 | End: 2022-09-25 | Stop reason: HOSPADM

## 2022-09-16 RX ORDER — PANTOPRAZOLE SODIUM 40 MG/1
40 TABLET, DELAYED RELEASE ORAL
Status: DISCONTINUED | OUTPATIENT
Start: 2022-09-16 | End: 2022-09-20

## 2022-09-16 RX ORDER — ONDANSETRON 4 MG/1
4 TABLET, FILM COATED ORAL 3 TIMES DAILY PRN
COMMUNITY
End: 2022-09-28 | Stop reason: HOSPADM

## 2022-09-16 RX ORDER — PROMETHAZINE HYDROCHLORIDE 12.5 MG/1
12.5 TABLET ORAL EVERY 6 HOURS PRN
COMMUNITY
End: 2022-09-28 | Stop reason: HOSPADM

## 2022-09-16 RX ORDER — BACLOFEN 10 MG/1
5 TABLET ORAL EVERY 12 HOURS SCHEDULED
Status: DISCONTINUED | OUTPATIENT
Start: 2022-09-16 | End: 2022-09-25

## 2022-09-16 RX ORDER — ATORVASTATIN CALCIUM 20 MG/1
20 TABLET, FILM COATED ORAL DAILY
Status: DISCONTINUED | OUTPATIENT
Start: 2022-09-16 | End: 2022-09-21

## 2022-09-16 RX ORDER — ONDANSETRON 4 MG/1
4 TABLET, ORALLY DISINTEGRATING ORAL ONCE
Status: COMPLETED | OUTPATIENT
Start: 2022-09-16 | End: 2022-09-16

## 2022-09-16 RX ORDER — ACETAMINOPHEN 325 MG/1
650 TABLET ORAL EVERY 4 HOURS PRN
Status: DISCONTINUED | OUTPATIENT
Start: 2022-09-16 | End: 2022-09-25 | Stop reason: HOSPADM

## 2022-09-16 RX ORDER — ONDANSETRON 2 MG/ML
4 INJECTION INTRAMUSCULAR; INTRAVENOUS EVERY 6 HOURS PRN
Status: DISCONTINUED | OUTPATIENT
Start: 2022-09-16 | End: 2022-09-16 | Stop reason: SDUPTHER

## 2022-09-16 RX ADMIN — LIDOCAINE HYDROCHLORIDE 80 MG: 20 INJECTION, SOLUTION EPIDURAL; INFILTRATION; INTRACAUDAL; PERINEURAL at 17:29

## 2022-09-16 RX ADMIN — BACLOFEN 5 MG: 10 TABLET ORAL at 21:52

## 2022-09-16 RX ADMIN — PANTOPRAZOLE SODIUM 80 MG: 40 INJECTION, POWDER, FOR SOLUTION INTRAVENOUS at 12:10

## 2022-09-16 RX ADMIN — ONDANSETRON 4 MG: 2 INJECTION INTRAMUSCULAR; INTRAVENOUS at 12:10

## 2022-09-16 RX ADMIN — Medication 3 ML: at 20:25

## 2022-09-16 RX ADMIN — LEVETIRACETAM 500 MG: 500 TABLET, FILM COATED ORAL at 21:52

## 2022-09-16 RX ADMIN — ONDANSETRON 4 MG: 2 INJECTION INTRAMUSCULAR; INTRAVENOUS at 19:35

## 2022-09-16 RX ADMIN — PROPOFOL 125 MG: 10 INJECTION, EMULSION INTRAVENOUS at 17:29

## 2022-09-16 RX ADMIN — METOCLOPRAMIDE 5 MG: 5 TABLET ORAL at 20:22

## 2022-09-16 RX ADMIN — SODIUM CHLORIDE 1000 ML: 9 INJECTION, SOLUTION INTRAVENOUS at 12:09

## 2022-09-16 RX ADMIN — SODIUM CHLORIDE: 0.9 INJECTION, SOLUTION INTRAVENOUS at 17:26

## 2022-09-16 RX ADMIN — ONDANSETRON 4 MG: 4 TABLET, ORALLY DISINTEGRATING ORAL at 11:00

## 2022-09-16 RX ADMIN — PANTOPRAZOLE SODIUM 40 MG: 40 TABLET, DELAYED RELEASE ORAL at 20:22

## 2022-09-16 NOTE — ANESTHESIA PREPROCEDURE EVALUATION
Anesthesia Evaluation     Patient summary reviewed and Nursing notes reviewed   NPO Solid Status: > 8 hours  NPO Liquid Status: > 8 hours           Airway   Mallampati: II  TM distance: >3 FB  Neck ROM: full  No difficulty expected  Dental - normal exam     Pulmonary - normal exam   Cardiovascular - normal exam    ECG reviewed    (+) hypertension, hyperlipidemia,       Neuro/Psych  (+) numbness, psychiatric history,    GI/Hepatic/Renal/Endo    (+) obesity,   renal disease, diabetes mellitus, thyroid problem     Musculoskeletal     (+) neck pain,   Abdominal  - normal exam    Bowel sounds: normal.   Substance History      OB/GYN          Other   arthritis,      ROS/Med Hx Other: SR    · Calculated left ventricular EF = 55% Estimated left ventricular EF was in agreement with the calculated left ventricular EF.  · Left ventricular diastolic function is consistent with (grade II w/high LAP) pseudonormalization.  · Estimated right ventricular systolic pressure from tricuspid regurgitation is normal (<35 mmHg).  · There is a small (<1cm) circumferential pericardial effusion.         1. Right internal jugular approach central line extends to the  cavoatrial junction.     2. Mild cardiomegaly, appears slightly improved since prior. There is  some central vascular congestive change but no overt edema.  3. Questionable small left pleural effusion.                       Anesthesia Plan    ASA 3     MAC     intravenous induction     Anesthetic plan, risks, benefits, and alternatives have been provided, discussed and informed consent has been obtained with: patient.        CODE STATUS:

## 2022-09-16 NOTE — ANESTHESIA POSTPROCEDURE EVALUATION
Patient: Abiola Mccurdy    Procedure Summary     Date: 09/16/22 Room / Location: [facility] ENDOSCOPY 1 / [facility] ENDOSCOPY    Anesthesia Start: 1725 Anesthesia Stop: 1734    Procedure: ESOPHAGOGASTRODUODENOSCOPY with biopsy x 1 area (N/A ) Diagnosis: (coffee ground emesis, epigastric pain)    Surgeons: Sid Middleton MD Provider: Jhonny Rae MD    Anesthesia Type: MAC ASA Status: 3          Anesthesia Type: MAC    Vitals  Vitals Value Taken Time   /63 09/16/22 1755   Temp     Pulse 59 09/16/22 1801   Resp 14 09/16/22 1740   SpO2 100 % 09/16/22 1801   Vitals shown include unvalidated device data.        Post Anesthesia Care and Evaluation    Patient location during evaluation: PACU  Patient participation: complete - patient participated  Level of consciousness: awake  Pain scale: See nurse's notes for pain score.  Pain management: adequate    Airway patency: patent  Anesthetic complications: No anesthetic complications  PONV Status: none  Cardiovascular status: acceptable  Respiratory status: acceptable  Hydration status: acceptable    Comments: Patient seen and examined postoperatively; vital signs stable; SpO2 greater than or equal to 90%; cardiopulmonary status stable; nausea/vomiting adequately controlled; pain adequately controlled; no apparent anesthesia complications; patient discharged from anesthesia care when discharge criteria were met

## 2022-09-17 LAB
ANION GAP SERPL CALCULATED.3IONS-SCNC: 19 MMOL/L (ref 5–15)
ANION GAP SERPL CALCULATED.3IONS-SCNC: 23 MMOL/L (ref 5–15)
BASOPHILS # BLD AUTO: 0 10*3/MM3 (ref 0–0.2)
BASOPHILS # BLD AUTO: 0 10*3/MM3 (ref 0–0.2)
BASOPHILS NFR BLD AUTO: 0.2 % (ref 0–1.5)
BASOPHILS NFR BLD AUTO: 0.4 % (ref 0–1.5)
BUN SERPL-MCNC: 46 MG/DL (ref 8–23)
BUN SERPL-MCNC: 54 MG/DL (ref 8–23)
BUN/CREAT SERPL: 5.7 (ref 7–25)
BUN/CREAT SERPL: 6.1 (ref 7–25)
CALCIUM SPEC-SCNC: 8.8 MG/DL (ref 8.6–10.5)
CALCIUM SPEC-SCNC: 9.4 MG/DL (ref 8.6–10.5)
CHLORIDE SERPL-SCNC: 94 MMOL/L (ref 98–107)
CHLORIDE SERPL-SCNC: 95 MMOL/L (ref 98–107)
CO2 SERPL-SCNC: 21 MMOL/L (ref 22–29)
CO2 SERPL-SCNC: 26 MMOL/L (ref 22–29)
CREAT SERPL-MCNC: 8.03 MG/DL (ref 0.57–1)
CREAT SERPL-MCNC: 8.82 MG/DL (ref 0.57–1)
D-LACTATE SERPL-SCNC: 1.2 MMOL/L (ref 0.5–2)
DEPRECATED RDW RBC AUTO: 45.9 FL (ref 37–54)
DEPRECATED RDW RBC AUTO: 47.7 FL (ref 37–54)
EGFRCR SERPLBLD CKD-EPI 2021: 4.3 ML/MIN/1.73
EGFRCR SERPLBLD CKD-EPI 2021: 4.8 ML/MIN/1.73
EOSINOPHIL # BLD AUTO: 0 10*3/MM3 (ref 0–0.4)
EOSINOPHIL # BLD AUTO: 0.3 10*3/MM3 (ref 0–0.4)
EOSINOPHIL NFR BLD AUTO: 0.2 % (ref 0.3–6.2)
EOSINOPHIL NFR BLD AUTO: 2 % (ref 0.3–6.2)
ERYTHROCYTE [DISTWIDTH] IN BLOOD BY AUTOMATED COUNT: 15.3 % (ref 12.3–15.4)
ERYTHROCYTE [DISTWIDTH] IN BLOOD BY AUTOMATED COUNT: 15.7 % (ref 12.3–15.4)
GLUCOSE BLDC GLUCOMTR-MCNC: 152 MG/DL (ref 70–105)
GLUCOSE BLDC GLUCOMTR-MCNC: 182 MG/DL (ref 70–105)
GLUCOSE BLDC GLUCOMTR-MCNC: 194 MG/DL (ref 70–105)
GLUCOSE BLDC GLUCOMTR-MCNC: 204 MG/DL (ref 70–105)
GLUCOSE SERPL-MCNC: 164 MG/DL (ref 65–99)
GLUCOSE SERPL-MCNC: 194 MG/DL (ref 65–99)
HCT VFR BLD AUTO: 32.8 % (ref 34–46.6)
HCT VFR BLD AUTO: 33.4 % (ref 34–46.6)
HGB BLD-MCNC: 10.3 G/DL (ref 12–15.9)
HGB BLD-MCNC: 10.4 G/DL (ref 12–15.9)
LYMPHOCYTES # BLD AUTO: 1.5 10*3/MM3 (ref 0.7–3.1)
LYMPHOCYTES # BLD AUTO: 1.7 10*3/MM3 (ref 0.7–3.1)
LYMPHOCYTES NFR BLD AUTO: 11.6 % (ref 19.6–45.3)
LYMPHOCYTES NFR BLD AUTO: 13 % (ref 19.6–45.3)
MCH RBC QN AUTO: 26.3 PG (ref 26.6–33)
MCH RBC QN AUTO: 26.8 PG (ref 26.6–33)
MCHC RBC AUTO-ENTMCNC: 30.7 G/DL (ref 31.5–35.7)
MCHC RBC AUTO-ENTMCNC: 31.7 G/DL (ref 31.5–35.7)
MCV RBC AUTO: 84.5 FL (ref 79–97)
MCV RBC AUTO: 85.6 FL (ref 79–97)
MONOCYTES # BLD AUTO: 0.9 10*3/MM3 (ref 0.1–0.9)
MONOCYTES # BLD AUTO: 1.1 10*3/MM3 (ref 0.1–0.9)
MONOCYTES NFR BLD AUTO: 6.8 % (ref 5–12)
MONOCYTES NFR BLD AUTO: 8.9 % (ref 5–12)
NEUTROPHILS NFR BLD AUTO: 10.2 10*3/MM3 (ref 1.7–7)
NEUTROPHILS NFR BLD AUTO: 77.7 % (ref 42.7–76)
NEUTROPHILS NFR BLD AUTO: 79.2 % (ref 42.7–76)
NEUTROPHILS NFR BLD AUTO: 9.9 10*3/MM3 (ref 1.7–7)
NRBC BLD AUTO-RTO: 0.1 /100 WBC (ref 0–0.2)
NRBC BLD AUTO-RTO: 0.1 /100 WBC (ref 0–0.2)
PLATELET # BLD AUTO: 390 10*3/MM3 (ref 140–450)
PLATELET # BLD AUTO: 432 10*3/MM3 (ref 140–450)
PMV BLD AUTO: 7.8 FL (ref 6–12)
PMV BLD AUTO: 8.1 FL (ref 6–12)
POTASSIUM SERPL-SCNC: 3.5 MMOL/L (ref 3.5–5.2)
POTASSIUM SERPL-SCNC: 4.2 MMOL/L (ref 3.5–5.2)
RBC # BLD AUTO: 3.88 10*6/MM3 (ref 3.77–5.28)
RBC # BLD AUTO: 3.91 10*6/MM3 (ref 3.77–5.28)
SODIUM SERPL-SCNC: 139 MMOL/L (ref 136–145)
SODIUM SERPL-SCNC: 139 MMOL/L (ref 136–145)
TROPONIN T SERPL-MCNC: 0.06 NG/ML (ref 0–0.03)
WBC NRBC COR # BLD: 12.7 10*3/MM3 (ref 3.4–10.8)
WBC NRBC COR # BLD: 12.9 10*3/MM3 (ref 3.4–10.8)

## 2022-09-17 PROCEDURE — 25010000002 ONDANSETRON PER 1 MG: Performed by: INTERNAL MEDICINE

## 2022-09-17 PROCEDURE — 82962 GLUCOSE BLOOD TEST: CPT

## 2022-09-17 PROCEDURE — 85025 COMPLETE CBC W/AUTO DIFF WBC: CPT | Performed by: INTERNAL MEDICINE

## 2022-09-17 PROCEDURE — 83605 ASSAY OF LACTIC ACID: CPT | Performed by: INTERNAL MEDICINE

## 2022-09-17 PROCEDURE — 63710000001 ONDANSETRON PER 8 MG: Performed by: INTERNAL MEDICINE

## 2022-09-17 PROCEDURE — 80048 BASIC METABOLIC PNL TOTAL CA: CPT | Performed by: INTERNAL MEDICINE

## 2022-09-17 RX ADMIN — BACLOFEN 5 MG: 10 TABLET ORAL at 08:08

## 2022-09-17 RX ADMIN — ONDANSETRON 4 MG: 2 INJECTION INTRAMUSCULAR; INTRAVENOUS at 01:45

## 2022-09-17 RX ADMIN — Medication 3 ML: at 20:04

## 2022-09-17 RX ADMIN — AMLODIPINE BESYLATE 5 MG: 5 TABLET ORAL at 08:08

## 2022-09-17 RX ADMIN — BACLOFEN 5 MG: 10 TABLET ORAL at 20:04

## 2022-09-17 RX ADMIN — LEVETIRACETAM 500 MG: 500 TABLET, FILM COATED ORAL at 08:08

## 2022-09-17 RX ADMIN — ONDANSETRON 4 MG: 2 INJECTION INTRAMUSCULAR; INTRAVENOUS at 08:20

## 2022-09-17 RX ADMIN — LINAGLIPTIN 5 MG: 5 TABLET, FILM COATED ORAL at 08:08

## 2022-09-17 RX ADMIN — ONDANSETRON HYDROCHLORIDE 4 MG: 4 TABLET, FILM COATED ORAL at 17:44

## 2022-09-17 RX ADMIN — PANTOPRAZOLE SODIUM 40 MG: 40 TABLET, DELAYED RELEASE ORAL at 08:08

## 2022-09-17 RX ADMIN — PANTOPRAZOLE SODIUM 40 MG: 40 TABLET, DELAYED RELEASE ORAL at 20:03

## 2022-09-17 RX ADMIN — METOCLOPRAMIDE 5 MG: 5 TABLET ORAL at 20:03

## 2022-09-17 RX ADMIN — ATORVASTATIN CALCIUM 20 MG: 20 TABLET, FILM COATED ORAL at 20:04

## 2022-09-17 RX ADMIN — LEVETIRACETAM 500 MG: 500 TABLET, FILM COATED ORAL at 20:04

## 2022-09-17 RX ADMIN — METOCLOPRAMIDE 5 MG: 5 TABLET ORAL at 11:00

## 2022-09-17 RX ADMIN — ATENOLOL 25 MG: 25 TABLET ORAL at 08:08

## 2022-09-17 RX ADMIN — Medication 3 ML: at 08:11

## 2022-09-17 RX ADMIN — METOCLOPRAMIDE 5 MG: 5 TABLET ORAL at 08:08

## 2022-09-17 RX ADMIN — LATANOPROST 1 DROP: 50 SOLUTION/ DROPS OPHTHALMIC at 20:03

## 2022-09-17 RX ADMIN — Medication 5 MG: at 20:03

## 2022-09-18 LAB
ANION GAP SERPL CALCULATED.3IONS-SCNC: 17 MMOL/L (ref 5–15)
ANION GAP SERPL CALCULATED.3IONS-SCNC: 18 MMOL/L (ref 5–15)
BASOPHILS # BLD AUTO: 0 10*3/MM3 (ref 0–0.2)
BASOPHILS NFR BLD AUTO: 0.4 % (ref 0–1.5)
BUN SERPL-MCNC: 60 MG/DL (ref 8–23)
BUN SERPL-MCNC: 64 MG/DL (ref 8–23)
BUN/CREAT SERPL: 6.6 (ref 7–25)
BUN/CREAT SERPL: 6.6 (ref 7–25)
CALCIUM SPEC-SCNC: 8.3 MG/DL (ref 8.6–10.5)
CALCIUM SPEC-SCNC: 8.9 MG/DL (ref 8.6–10.5)
CHLORIDE SERPL-SCNC: 94 MMOL/L (ref 98–107)
CHLORIDE SERPL-SCNC: 96 MMOL/L (ref 98–107)
CO2 SERPL-SCNC: 25 MMOL/L (ref 22–29)
CO2 SERPL-SCNC: 25 MMOL/L (ref 22–29)
CREAT SERPL-MCNC: 9.12 MG/DL (ref 0.57–1)
CREAT SERPL-MCNC: 9.64 MG/DL (ref 0.57–1)
DEPRECATED RDW RBC AUTO: 45.1 FL (ref 37–54)
EGFRCR SERPLBLD CKD-EPI 2021: 3.9 ML/MIN/1.73
EGFRCR SERPLBLD CKD-EPI 2021: 4.1 ML/MIN/1.73
EOSINOPHIL # BLD AUTO: 0.4 10*3/MM3 (ref 0–0.4)
EOSINOPHIL NFR BLD AUTO: 4.2 % (ref 0.3–6.2)
ERYTHROCYTE [DISTWIDTH] IN BLOOD BY AUTOMATED COUNT: 15.2 % (ref 12.3–15.4)
GLUCOSE BLDC GLUCOMTR-MCNC: 148 MG/DL (ref 70–105)
GLUCOSE BLDC GLUCOMTR-MCNC: 148 MG/DL (ref 70–105)
GLUCOSE BLDC GLUCOMTR-MCNC: 168 MG/DL (ref 70–105)
GLUCOSE SERPL-MCNC: 190 MG/DL (ref 65–99)
GLUCOSE SERPL-MCNC: 202 MG/DL (ref 65–99)
HCT VFR BLD AUTO: 29.5 % (ref 34–46.6)
HGB BLD-MCNC: 9.7 G/DL (ref 12–15.9)
INR PPP: 0.98 (ref 0.93–1.1)
INR PPP: 1.01 (ref 0.93–1.1)
LYMPHOCYTES # BLD AUTO: 1.5 10*3/MM3 (ref 0.7–3.1)
LYMPHOCYTES NFR BLD AUTO: 15.9 % (ref 19.6–45.3)
MCH RBC QN AUTO: 27.4 PG (ref 26.6–33)
MCHC RBC AUTO-ENTMCNC: 33.1 G/DL (ref 31.5–35.7)
MCV RBC AUTO: 83 FL (ref 79–97)
MONOCYTES # BLD AUTO: 0.9 10*3/MM3 (ref 0.1–0.9)
MONOCYTES NFR BLD AUTO: 9.6 % (ref 5–12)
NEUTROPHILS NFR BLD AUTO: 6.5 10*3/MM3 (ref 1.7–7)
NEUTROPHILS NFR BLD AUTO: 69.9 % (ref 42.7–76)
NRBC BLD AUTO-RTO: 0.1 /100 WBC (ref 0–0.2)
PLATELET # BLD AUTO: 324 10*3/MM3 (ref 140–450)
PMV BLD AUTO: 7.7 FL (ref 6–12)
POTASSIUM SERPL-SCNC: 2.9 MMOL/L (ref 3.5–5.2)
POTASSIUM SERPL-SCNC: 3.3 MMOL/L (ref 3.5–5.2)
PROTHROMBIN TIME: 10.1 SECONDS (ref 9.6–11.7)
PROTHROMBIN TIME: 10.4 SECONDS (ref 9.6–11.7)
RBC # BLD AUTO: 3.55 10*6/MM3 (ref 3.77–5.28)
SODIUM SERPL-SCNC: 137 MMOL/L (ref 136–145)
SODIUM SERPL-SCNC: 138 MMOL/L (ref 136–145)
WBC NRBC COR # BLD: 9.3 10*3/MM3 (ref 3.4–10.8)

## 2022-09-18 PROCEDURE — 84100 ASSAY OF PHOSPHORUS: CPT | Performed by: INTERNAL MEDICINE

## 2022-09-18 PROCEDURE — 85610 PROTHROMBIN TIME: CPT | Performed by: INTERNAL MEDICINE

## 2022-09-18 PROCEDURE — 80048 BASIC METABOLIC PNL TOTAL CA: CPT | Performed by: INTERNAL MEDICINE

## 2022-09-18 PROCEDURE — 83735 ASSAY OF MAGNESIUM: CPT | Performed by: INTERNAL MEDICINE

## 2022-09-18 PROCEDURE — 97162 PT EVAL MOD COMPLEX 30 MIN: CPT

## 2022-09-18 PROCEDURE — 25010000002 ONDANSETRON PER 1 MG: Performed by: INTERNAL MEDICINE

## 2022-09-18 PROCEDURE — 63710000001 ONDANSETRON PER 8 MG: Performed by: INTERNAL MEDICINE

## 2022-09-18 PROCEDURE — 85025 COMPLETE CBC W/AUTO DIFF WBC: CPT | Performed by: INTERNAL MEDICINE

## 2022-09-18 PROCEDURE — 82962 GLUCOSE BLOOD TEST: CPT

## 2022-09-18 RX ORDER — ALBUMIN (HUMAN) 12.5 G/50ML
25 SOLUTION INTRAVENOUS AS NEEDED
Status: CANCELLED | OUTPATIENT
Start: 2022-09-19 | End: 2022-09-19

## 2022-09-18 RX ADMIN — PANTOPRAZOLE SODIUM 40 MG: 40 TABLET, DELAYED RELEASE ORAL at 08:56

## 2022-09-18 RX ADMIN — LINAGLIPTIN 5 MG: 5 TABLET, FILM COATED ORAL at 08:56

## 2022-09-18 RX ADMIN — ATENOLOL 25 MG: 25 TABLET ORAL at 08:56

## 2022-09-18 RX ADMIN — Medication 3 ML: at 09:00

## 2022-09-18 RX ADMIN — METOCLOPRAMIDE 5 MG: 5 TABLET ORAL at 08:56

## 2022-09-18 RX ADMIN — PANTOPRAZOLE SODIUM 40 MG: 40 TABLET, DELAYED RELEASE ORAL at 17:09

## 2022-09-18 RX ADMIN — AMLODIPINE BESYLATE 5 MG: 5 TABLET ORAL at 08:56

## 2022-09-18 RX ADMIN — BACLOFEN 5 MG: 10 TABLET ORAL at 08:55

## 2022-09-18 RX ADMIN — ONDANSETRON 4 MG: 2 INJECTION INTRAMUSCULAR; INTRAVENOUS at 08:54

## 2022-09-18 RX ADMIN — Medication 3 ML: at 20:27

## 2022-09-18 RX ADMIN — BACLOFEN 5 MG: 10 TABLET ORAL at 20:27

## 2022-09-18 RX ADMIN — LATANOPROST 1 DROP: 50 SOLUTION/ DROPS OPHTHALMIC at 20:27

## 2022-09-18 RX ADMIN — ONDANSETRON HYDROCHLORIDE 4 MG: 4 TABLET, FILM COATED ORAL at 17:08

## 2022-09-18 RX ADMIN — LEVETIRACETAM 500 MG: 500 TABLET, FILM COATED ORAL at 20:27

## 2022-09-18 RX ADMIN — ATORVASTATIN CALCIUM 20 MG: 20 TABLET, FILM COATED ORAL at 20:27

## 2022-09-18 RX ADMIN — LEVETIRACETAM 500 MG: 500 TABLET, FILM COATED ORAL at 08:56

## 2022-09-18 RX ADMIN — METOCLOPRAMIDE 5 MG: 5 TABLET ORAL at 12:28

## 2022-09-18 RX ADMIN — METOCLOPRAMIDE 5 MG: 5 TABLET ORAL at 17:09

## 2022-09-19 ENCOUNTER — TELEPHONE (OUTPATIENT)
Dept: DIABETES SERVICES | Facility: HOSPITAL | Age: 77
End: 2022-09-19

## 2022-09-19 ENCOUNTER — APPOINTMENT (OUTPATIENT)
Dept: INTERVENTIONAL RADIOLOGY/VASCULAR | Facility: HOSPITAL | Age: 77
End: 2022-09-19

## 2022-09-19 LAB
GLUCOSE BLDC GLUCOMTR-MCNC: 182 MG/DL (ref 70–105)
GLUCOSE BLDC GLUCOMTR-MCNC: 183 MG/DL (ref 70–105)
GLUCOSE BLDC GLUCOMTR-MCNC: 190 MG/DL (ref 70–105)
GLUCOSE BLDC GLUCOMTR-MCNC: 201 MG/DL (ref 70–105)
HBA1C MFR BLD: 6.8 % (ref 3.5–5.6)
MAGNESIUM SERPL-MCNC: 1.9 MG/DL (ref 1.6–2.4)
PHOSPHATE SERPL-MCNC: 4.9 MG/DL (ref 2.5–4.5)
POTASSIUM SERPL-SCNC: 3.3 MMOL/L (ref 3.5–5.2)

## 2022-09-19 PROCEDURE — 25010000002 ONDANSETRON PER 1 MG: Performed by: INTERNAL MEDICINE

## 2022-09-19 PROCEDURE — 25010000002 HEPARIN (PORCINE) PER 1000 UNITS: Performed by: RADIOLOGY

## 2022-09-19 PROCEDURE — 97530 THERAPEUTIC ACTIVITIES: CPT

## 2022-09-19 PROCEDURE — C1750 CATH, HEMODIALYSIS,LONG-TERM: HCPCS

## 2022-09-19 PROCEDURE — 97166 OT EVAL MOD COMPLEX 45 MIN: CPT

## 2022-09-19 PROCEDURE — 84132 ASSAY OF SERUM POTASSIUM: CPT | Performed by: INTERNAL MEDICINE

## 2022-09-19 PROCEDURE — 97112 NEUROMUSCULAR REEDUCATION: CPT

## 2022-09-19 PROCEDURE — 25010000002 CEFAZOLIN PER 500 MG: Performed by: RADIOLOGY

## 2022-09-19 PROCEDURE — 36581 REPLACE TUNNELED CV CATH: CPT

## 2022-09-19 PROCEDURE — 25010000002 ONDANSETRON PER 1 MG: Performed by: RADIOLOGY

## 2022-09-19 PROCEDURE — 82962 GLUCOSE BLOOD TEST: CPT

## 2022-09-19 PROCEDURE — 0 LIDOCAINE 1 % SOLUTION: Performed by: RADIOLOGY

## 2022-09-19 PROCEDURE — 77002 NEEDLE LOCALIZATION BY XRAY: CPT

## 2022-09-19 RX ORDER — HEPARIN SODIUM 1000 [USP'U]/ML
INJECTION, SOLUTION INTRAVENOUS; SUBCUTANEOUS
Status: COMPLETED | OUTPATIENT
Start: 2022-09-19 | End: 2022-09-19

## 2022-09-19 RX ORDER — LIDOCAINE HYDROCHLORIDE 10 MG/ML
INJECTION, SOLUTION INFILTRATION; PERINEURAL
Status: COMPLETED | OUTPATIENT
Start: 2022-09-19 | End: 2022-09-19

## 2022-09-19 RX ORDER — POTASSIUM CHLORIDE 20 MEQ/1
20 TABLET, EXTENDED RELEASE ORAL ONCE
Status: DISCONTINUED | OUTPATIENT
Start: 2022-09-19 | End: 2022-09-25

## 2022-09-19 RX ORDER — ONDANSETRON 2 MG/ML
INJECTION INTRAMUSCULAR; INTRAVENOUS
Status: COMPLETED | OUTPATIENT
Start: 2022-09-19 | End: 2022-09-19

## 2022-09-19 RX ADMIN — CEFAZOLIN SODIUM 2 G: 1 INJECTION, POWDER, FOR SOLUTION INTRAMUSCULAR; INTRAVENOUS at 16:31

## 2022-09-19 RX ADMIN — BACLOFEN 5 MG: 10 TABLET ORAL at 08:18

## 2022-09-19 RX ADMIN — LIDOCAINE HYDROCHLORIDE 2 ML: 10 INJECTION, SOLUTION INFILTRATION; PERINEURAL at 18:07

## 2022-09-19 RX ADMIN — HEPARIN SODIUM 4100 UNITS: 1000 INJECTION, SOLUTION INTRAVENOUS; SUBCUTANEOUS at 18:14

## 2022-09-19 RX ADMIN — PANTOPRAZOLE SODIUM 40 MG: 40 TABLET, DELAYED RELEASE ORAL at 08:18

## 2022-09-19 RX ADMIN — Medication 3 ML: at 08:20

## 2022-09-19 RX ADMIN — AMLODIPINE BESYLATE 5 MG: 5 TABLET ORAL at 08:18

## 2022-09-19 RX ADMIN — LINAGLIPTIN 5 MG: 5 TABLET, FILM COATED ORAL at 08:18

## 2022-09-19 RX ADMIN — ONDANSETRON 4 MG: 2 INJECTION INTRAMUSCULAR; INTRAVENOUS at 04:14

## 2022-09-19 RX ADMIN — LEVETIRACETAM 500 MG: 500 TABLET, FILM COATED ORAL at 08:18

## 2022-09-19 RX ADMIN — LATANOPROST 1 DROP: 50 SOLUTION/ DROPS OPHTHALMIC at 21:24

## 2022-09-19 RX ADMIN — METOCLOPRAMIDE 5 MG: 5 TABLET ORAL at 08:18

## 2022-09-19 RX ADMIN — ONDANSETRON 4 MG: 2 INJECTION INTRAMUSCULAR; INTRAVENOUS at 17:49

## 2022-09-19 RX ADMIN — ATENOLOL 25 MG: 25 TABLET ORAL at 08:18

## 2022-09-19 RX ADMIN — Medication 3 ML: at 21:24

## 2022-09-19 RX ADMIN — METOCLOPRAMIDE 5 MG: 5 TABLET ORAL at 11:57

## 2022-09-19 RX ADMIN — ACETAMINOPHEN 650 MG: 325 TABLET, FILM COATED ORAL at 04:52

## 2022-09-19 RX ADMIN — LIDOCAINE HYDROCHLORIDE 7 ML: 10 INJECTION, SOLUTION INFILTRATION; PERINEURAL at 18:03

## 2022-09-20 ENCOUNTER — APPOINTMENT (OUTPATIENT)
Dept: CT IMAGING | Facility: HOSPITAL | Age: 77
End: 2022-09-20

## 2022-09-20 ENCOUNTER — APPOINTMENT (OUTPATIENT)
Dept: MRI IMAGING | Facility: HOSPITAL | Age: 77
End: 2022-09-20

## 2022-09-20 LAB
ALBUMIN SERPL-MCNC: 2.9 G/DL (ref 3.5–5.2)
ALBUMIN/GLOB SERPL: 0.6 G/DL
ALP SERPL-CCNC: 82 U/L (ref 39–117)
ALT SERPL W P-5'-P-CCNC: <5 U/L (ref 1–33)
ANION GAP SERPL CALCULATED.3IONS-SCNC: 15 MMOL/L (ref 5–15)
ANION GAP SERPL CALCULATED.3IONS-SCNC: 17 MMOL/L (ref 5–15)
APTT PPP: 32.4 SECONDS (ref 24–31)
AST SERPL-CCNC: 36 U/L (ref 1–32)
BASOPHILS # BLD AUTO: 0 10*3/MM3 (ref 0–0.2)
BASOPHILS # BLD AUTO: 0 10*3/MM3 (ref 0–0.2)
BASOPHILS NFR BLD AUTO: 0.2 % (ref 0–1.5)
BASOPHILS NFR BLD AUTO: 0.2 % (ref 0–1.5)
BILIRUB SERPL-MCNC: 0.3 MG/DL (ref 0–1.2)
BUN SERPL-MCNC: 35 MG/DL (ref 8–23)
BUN SERPL-MCNC: 54 MG/DL (ref 8–23)
BUN/CREAT SERPL: 6 (ref 7–25)
BUN/CREAT SERPL: 7.2 (ref 7–25)
CALCIUM SPEC-SCNC: 8.7 MG/DL (ref 8.6–10.5)
CALCIUM SPEC-SCNC: 9 MG/DL (ref 8.6–10.5)
CHLORIDE SERPL-SCNC: 93 MMOL/L (ref 98–107)
CHLORIDE SERPL-SCNC: 98 MMOL/L (ref 98–107)
CO2 SERPL-SCNC: 23 MMOL/L (ref 22–29)
CO2 SERPL-SCNC: 26 MMOL/L (ref 22–29)
CREAT SERPL-MCNC: 5.81 MG/DL (ref 0.57–1)
CREAT SERPL-MCNC: 7.52 MG/DL (ref 0.57–1)
DEPRECATED RDW RBC AUTO: 45.5 FL (ref 37–54)
DEPRECATED RDW RBC AUTO: 48.6 FL (ref 37–54)
EGFRCR SERPLBLD CKD-EPI 2021: 5.2 ML/MIN/1.73
EGFRCR SERPLBLD CKD-EPI 2021: 7.1 ML/MIN/1.73
EOSINOPHIL # BLD AUTO: 0.1 10*3/MM3 (ref 0–0.4)
EOSINOPHIL # BLD AUTO: 0.3 10*3/MM3 (ref 0–0.4)
EOSINOPHIL NFR BLD AUTO: 0.8 % (ref 0.3–6.2)
EOSINOPHIL NFR BLD AUTO: 3.3 % (ref 0.3–6.2)
ERYTHROCYTE [DISTWIDTH] IN BLOOD BY AUTOMATED COUNT: 15.5 % (ref 12.3–15.4)
ERYTHROCYTE [DISTWIDTH] IN BLOOD BY AUTOMATED COUNT: 16 % (ref 12.3–15.4)
GLOBULIN UR ELPH-MCNC: 4.6 GM/DL
GLUCOSE BLDC GLUCOMTR-MCNC: 175 MG/DL (ref 70–105)
GLUCOSE BLDC GLUCOMTR-MCNC: 180 MG/DL (ref 70–105)
GLUCOSE BLDC GLUCOMTR-MCNC: 181 MG/DL (ref 70–105)
GLUCOSE BLDC GLUCOMTR-MCNC: 181 MG/DL (ref 70–105)
GLUCOSE SERPL-MCNC: 173 MG/DL (ref 65–99)
GLUCOSE SERPL-MCNC: 174 MG/DL (ref 65–99)
HCT VFR BLD AUTO: 32.2 % (ref 34–46.6)
HCT VFR BLD AUTO: 32.9 % (ref 34–46.6)
HGB BLD-MCNC: 10.1 G/DL (ref 12–15.9)
HGB BLD-MCNC: 10.4 G/DL (ref 12–15.9)
INR PPP: 1.06 (ref 0.93–1.1)
LAB AP CASE REPORT: NORMAL
LYMPHOCYTES # BLD AUTO: 1 10*3/MM3 (ref 0.7–3.1)
LYMPHOCYTES # BLD AUTO: 1.5 10*3/MM3 (ref 0.7–3.1)
LYMPHOCYTES NFR BLD AUTO: 12.5 % (ref 19.6–45.3)
LYMPHOCYTES NFR BLD AUTO: 15.1 % (ref 19.6–45.3)
MCH RBC QN AUTO: 26.4 PG (ref 26.6–33)
MCH RBC QN AUTO: 27.1 PG (ref 26.6–33)
MCHC RBC AUTO-ENTMCNC: 31.5 G/DL (ref 31.5–35.7)
MCHC RBC AUTO-ENTMCNC: 31.5 G/DL (ref 31.5–35.7)
MCV RBC AUTO: 83.8 FL (ref 79–97)
MCV RBC AUTO: 86.2 FL (ref 79–97)
MONOCYTES # BLD AUTO: 0.4 10*3/MM3 (ref 0.1–0.9)
MONOCYTES # BLD AUTO: 1.2 10*3/MM3 (ref 0.1–0.9)
MONOCYTES NFR BLD AUTO: 12 % (ref 5–12)
MONOCYTES NFR BLD AUTO: 5.3 % (ref 5–12)
NEUTROPHILS NFR BLD AUTO: 6.4 10*3/MM3 (ref 1.7–7)
NEUTROPHILS NFR BLD AUTO: 6.7 10*3/MM3 (ref 1.7–7)
NEUTROPHILS NFR BLD AUTO: 69.4 % (ref 42.7–76)
NEUTROPHILS NFR BLD AUTO: 81.2 % (ref 42.7–76)
NRBC BLD AUTO-RTO: 0.1 /100 WBC (ref 0–0.2)
NRBC BLD AUTO-RTO: 0.1 /100 WBC (ref 0–0.2)
PATH REPORT.FINAL DX SPEC: NORMAL
PATH REPORT.GROSS SPEC: NORMAL
PLATELET # BLD AUTO: 368 10*3/MM3 (ref 140–450)
PLATELET # BLD AUTO: 395 10*3/MM3 (ref 140–450)
PMV BLD AUTO: 8.2 FL (ref 6–12)
PMV BLD AUTO: 8.4 FL (ref 6–12)
POTASSIUM SERPL-SCNC: 3.2 MMOL/L (ref 3.5–5.2)
POTASSIUM SERPL-SCNC: 4 MMOL/L (ref 3.5–5.2)
PROT SERPL-MCNC: 7.5 G/DL (ref 6–8.5)
PROTHROMBIN TIME: 10.9 SECONDS (ref 9.6–11.7)
RBC # BLD AUTO: 3.73 10*6/MM3 (ref 3.77–5.28)
RBC # BLD AUTO: 3.93 10*6/MM3 (ref 3.77–5.28)
SODIUM SERPL-SCNC: 133 MMOL/L (ref 136–145)
SODIUM SERPL-SCNC: 139 MMOL/L (ref 136–145)
WBC NRBC COR # BLD: 7.8 10*3/MM3 (ref 3.4–10.8)
WBC NRBC COR # BLD: 9.7 10*3/MM3 (ref 3.4–10.8)

## 2022-09-20 PROCEDURE — 85610 PROTHROMBIN TIME: CPT | Performed by: PSYCHIATRY & NEUROLOGY

## 2022-09-20 PROCEDURE — 70498 CT ANGIOGRAPHY NECK: CPT

## 2022-09-20 PROCEDURE — 25010000002 MORPHINE PER 10 MG: Performed by: INTERNAL MEDICINE

## 2022-09-20 PROCEDURE — 99497 ADVNCD CARE PLAN 30 MIN: CPT | Performed by: NURSE PRACTITIONER

## 2022-09-20 PROCEDURE — 02HV33Z INSERTION OF INFUSION DEVICE INTO SUPERIOR VENA CAVA, PERCUTANEOUS APPROACH: ICD-10-PCS | Performed by: INTERNAL MEDICINE

## 2022-09-20 PROCEDURE — 99222 1ST HOSP IP/OBS MODERATE 55: CPT | Performed by: NURSE PRACTITIONER

## 2022-09-20 PROCEDURE — 80053 COMPREHEN METABOLIC PANEL: CPT | Performed by: INTERNAL MEDICINE

## 2022-09-20 PROCEDURE — 99223 1ST HOSP IP/OBS HIGH 75: CPT | Performed by: PSYCHIATRY & NEUROLOGY

## 2022-09-20 PROCEDURE — 82962 GLUCOSE BLOOD TEST: CPT

## 2022-09-20 PROCEDURE — 85730 THROMBOPLASTIN TIME PARTIAL: CPT | Performed by: PSYCHIATRY & NEUROLOGY

## 2022-09-20 PROCEDURE — 4A03X5D MEASUREMENT OF ARTERIAL FLOW, INTRACRANIAL, EXTERNAL APPROACH: ICD-10-PCS | Performed by: RADIOLOGY

## 2022-09-20 PROCEDURE — C1751 CATH, INF, PER/CENT/MIDLINE: HCPCS

## 2022-09-20 PROCEDURE — 25010000002 METOCLOPRAMIDE PER 10 MG: Performed by: INTERNAL MEDICINE

## 2022-09-20 PROCEDURE — 70496 CT ANGIOGRAPHY HEAD: CPT

## 2022-09-20 PROCEDURE — 25010000002 LEVETIRACETAM IN NACL 0.82% 500 MG/100ML SOLUTION: Performed by: INTERNAL MEDICINE

## 2022-09-20 PROCEDURE — 70450 CT HEAD/BRAIN W/O DYE: CPT

## 2022-09-20 PROCEDURE — 85025 COMPLETE CBC W/AUTO DIFF WBC: CPT | Performed by: INTERNAL MEDICINE

## 2022-09-20 PROCEDURE — 0 IOPAMIDOL PER 1 ML: Performed by: INTERNAL MEDICINE

## 2022-09-20 PROCEDURE — 0042T HC CT CEREBRAL PERFUSION W/WO CONTRAST: CPT

## 2022-09-20 PROCEDURE — 70551 MRI BRAIN STEM W/O DYE: CPT

## 2022-09-20 PROCEDURE — 85025 COMPLETE CBC W/AUTO DIFF WBC: CPT | Performed by: PSYCHIATRY & NEUROLOGY

## 2022-09-20 PROCEDURE — 25010000002 ONDANSETRON PER 1 MG: Performed by: INTERNAL MEDICINE

## 2022-09-20 PROCEDURE — 92610 EVALUATE SWALLOWING FUNCTION: CPT

## 2022-09-20 RX ORDER — ASPIRIN 300 MG/1
300 SUPPOSITORY RECTAL DAILY
Status: DISCONTINUED | OUTPATIENT
Start: 2022-09-20 | End: 2022-09-23

## 2022-09-20 RX ORDER — SODIUM CHLORIDE 0.9 % (FLUSH) 0.9 %
10 SYRINGE (ML) INJECTION EVERY 12 HOURS SCHEDULED
Status: DISCONTINUED | OUTPATIENT
Start: 2022-09-20 | End: 2022-09-25

## 2022-09-20 RX ORDER — MORPHINE SULFATE 2 MG/ML
2 INJECTION, SOLUTION INTRAMUSCULAR; INTRAVENOUS
Status: DISCONTINUED | OUTPATIENT
Start: 2022-09-20 | End: 2022-09-25 | Stop reason: HOSPADM

## 2022-09-20 RX ORDER — PANTOPRAZOLE SODIUM 40 MG/10ML
40 INJECTION, POWDER, LYOPHILIZED, FOR SOLUTION INTRAVENOUS EVERY 12 HOURS SCHEDULED
Status: DISCONTINUED | OUTPATIENT
Start: 2022-09-20 | End: 2022-09-25

## 2022-09-20 RX ORDER — LEVETIRACETAM 5 MG/ML
500 INJECTION INTRAVASCULAR EVERY 12 HOURS SCHEDULED
Status: DISCONTINUED | OUTPATIENT
Start: 2022-09-20 | End: 2022-09-25

## 2022-09-20 RX ORDER — POTASSIUM CHLORIDE 20 MEQ/1
40 TABLET, EXTENDED RELEASE ORAL ONCE
Status: DISCONTINUED | OUTPATIENT
Start: 2022-09-20 | End: 2022-09-25 | Stop reason: HOSPADM

## 2022-09-20 RX ORDER — METOCLOPRAMIDE HYDROCHLORIDE 5 MG/ML
5 INJECTION INTRAMUSCULAR; INTRAVENOUS EVERY 8 HOURS PRN
Status: DISCONTINUED | OUTPATIENT
Start: 2022-09-20 | End: 2022-09-25

## 2022-09-20 RX ORDER — SODIUM CHLORIDE 0.9 % (FLUSH) 0.9 %
10 SYRINGE (ML) INJECTION AS NEEDED
Status: DISCONTINUED | OUTPATIENT
Start: 2022-09-20 | End: 2022-09-25 | Stop reason: HOSPADM

## 2022-09-20 RX ORDER — SODIUM CHLORIDE 0.9 % (FLUSH) 0.9 %
20 SYRINGE (ML) INJECTION AS NEEDED
Status: DISCONTINUED | OUTPATIENT
Start: 2022-09-20 | End: 2022-09-25 | Stop reason: HOSPADM

## 2022-09-20 RX ADMIN — PANTOPRAZOLE SODIUM 40 MG: 40 INJECTION, POWDER, FOR SOLUTION INTRAVENOUS at 21:45

## 2022-09-20 RX ADMIN — ASPIRIN 300 MG: 300 SUPPOSITORY RECTAL at 11:32

## 2022-09-20 RX ADMIN — IOPAMIDOL 100 ML: 755 INJECTION, SOLUTION INTRAVENOUS at 08:05

## 2022-09-20 RX ADMIN — METOCLOPRAMIDE HYDROCHLORIDE 5 MG: 5 INJECTION INTRAMUSCULAR; INTRAVENOUS at 23:50

## 2022-09-20 RX ADMIN — LEVETIRACETAM 500 MG: 5 INJECTION INTRAVENOUS at 21:46

## 2022-09-20 RX ADMIN — IOPAMIDOL 50 ML: 755 INJECTION, SOLUTION INTRAVENOUS at 08:07

## 2022-09-20 RX ADMIN — Medication 10 ML: at 20:29

## 2022-09-20 RX ADMIN — MORPHINE SULFATE 2 MG: 2 INJECTION, SOLUTION INTRAMUSCULAR; INTRAVENOUS at 16:17

## 2022-09-20 RX ADMIN — MORPHINE SULFATE 2 MG: 2 INJECTION, SOLUTION INTRAMUSCULAR; INTRAVENOUS at 20:29

## 2022-09-20 RX ADMIN — Medication 3 ML: at 20:29

## 2022-09-20 RX ADMIN — MORPHINE SULFATE 2 MG: 2 INJECTION, SOLUTION INTRAMUSCULAR; INTRAVENOUS at 23:50

## 2022-09-20 RX ADMIN — LATANOPROST 1 DROP: 50 SOLUTION/ DROPS OPHTHALMIC at 21:46

## 2022-09-20 RX ADMIN — ONDANSETRON 4 MG: 2 INJECTION INTRAMUSCULAR; INTRAVENOUS at 18:09

## 2022-09-21 ENCOUNTER — APPOINTMENT (OUTPATIENT)
Dept: GENERAL RADIOLOGY | Facility: HOSPITAL | Age: 77
End: 2022-09-21

## 2022-09-21 LAB
ANION GAP SERPL CALCULATED.3IONS-SCNC: 17 MMOL/L (ref 5–15)
BACTERIA SPEC AEROBE CULT: NORMAL
BACTERIA SPEC AEROBE CULT: NORMAL
BUN SERPL-MCNC: 44 MG/DL (ref 8–23)
BUN/CREAT SERPL: 6.3 (ref 7–25)
CALCIUM SPEC-SCNC: 9.1 MG/DL (ref 8.6–10.5)
CHLORIDE SERPL-SCNC: 94 MMOL/L (ref 98–107)
CHOLEST SERPL-MCNC: 133 MG/DL (ref 0–200)
CO2 SERPL-SCNC: 23 MMOL/L (ref 22–29)
CREAT SERPL-MCNC: 7.03 MG/DL (ref 0.57–1)
DEPRECATED RDW RBC AUTO: 48.6 FL (ref 37–54)
EGFRCR SERPLBLD CKD-EPI 2021: 5.7 ML/MIN/1.73
ERYTHROCYTE [DISTWIDTH] IN BLOOD BY AUTOMATED COUNT: 16.1 % (ref 12.3–15.4)
GLUCOSE BLDC GLUCOMTR-MCNC: 134 MG/DL (ref 70–105)
GLUCOSE BLDC GLUCOMTR-MCNC: 157 MG/DL (ref 70–105)
GLUCOSE BLDC GLUCOMTR-MCNC: 196 MG/DL (ref 70–105)
GLUCOSE SERPL-MCNC: 202 MG/DL (ref 65–99)
HCT VFR BLD AUTO: 32.2 % (ref 34–46.6)
HDLC SERPL-MCNC: 53 MG/DL (ref 40–60)
HGB BLD-MCNC: 10.3 G/DL (ref 12–15.9)
LDLC SERPL CALC-MCNC: 62 MG/DL (ref 0–100)
LDLC/HDLC SERPL: 1.15 {RATIO}
MCH RBC QN AUTO: 27.1 PG (ref 26.6–33)
MCHC RBC AUTO-ENTMCNC: 32.1 G/DL (ref 31.5–35.7)
MCV RBC AUTO: 84.3 FL (ref 79–97)
PLATELET # BLD AUTO: 318 10*3/MM3 (ref 140–450)
PMV BLD AUTO: 7.9 FL (ref 6–12)
POTASSIUM SERPL-SCNC: 4 MMOL/L (ref 3.5–5.2)
QT INTERVAL: 464 MS
RBC # BLD AUTO: 3.82 10*6/MM3 (ref 3.77–5.28)
SARS-COV-2 RNA PNL SPEC NAA+PROBE: NOT DETECTED
SODIUM SERPL-SCNC: 134 MMOL/L (ref 136–145)
TRIGL SERPL-MCNC: 94 MG/DL (ref 0–150)
TSH SERPL DL<=0.05 MIU/L-ACNC: 1.71 UIU/ML (ref 0.27–4.2)
VIT B12 BLD-MCNC: >2000 PG/ML (ref 211–946)
VLDLC SERPL-MCNC: 18 MG/DL (ref 5–40)
WBC NRBC COR # BLD: 8.6 10*3/MM3 (ref 3.4–10.8)

## 2022-09-21 PROCEDURE — 99233 SBSQ HOSP IP/OBS HIGH 50: CPT | Performed by: PSYCHIATRY & NEUROLOGY

## 2022-09-21 PROCEDURE — 25010000002 ONDANSETRON PER 1 MG: Performed by: INTERNAL MEDICINE

## 2022-09-21 PROCEDURE — 80048 BASIC METABOLIC PNL TOTAL CA: CPT | Performed by: INTERNAL MEDICINE

## 2022-09-21 PROCEDURE — 25010000002 ALBUMIN HUMAN 25% PER 50 ML: Performed by: INTERNAL MEDICINE

## 2022-09-21 PROCEDURE — 92526 ORAL FUNCTION THERAPY: CPT

## 2022-09-21 PROCEDURE — 82962 GLUCOSE BLOOD TEST: CPT

## 2022-09-21 PROCEDURE — 84443 ASSAY THYROID STIM HORMONE: CPT | Performed by: PSYCHIATRY & NEUROLOGY

## 2022-09-21 PROCEDURE — 80061 LIPID PANEL: CPT | Performed by: PSYCHIATRY & NEUROLOGY

## 2022-09-21 PROCEDURE — 82607 VITAMIN B-12: CPT | Performed by: PSYCHIATRY & NEUROLOGY

## 2022-09-21 PROCEDURE — 85027 COMPLETE CBC AUTOMATED: CPT | Performed by: INTERNAL MEDICINE

## 2022-09-21 PROCEDURE — P9047 ALBUMIN (HUMAN), 25%, 50ML: HCPCS | Performed by: INTERNAL MEDICINE

## 2022-09-21 PROCEDURE — 74018 RADEX ABDOMEN 1 VIEW: CPT

## 2022-09-21 PROCEDURE — 25010000002 HEPARIN (PORCINE) PER 1000 UNITS: Performed by: INTERNAL MEDICINE

## 2022-09-21 PROCEDURE — 92523 SPEECH SOUND LANG COMPREHEN: CPT

## 2022-09-21 PROCEDURE — 87635 SARS-COV-2 COVID-19 AMP PRB: CPT | Performed by: INTERNAL MEDICINE

## 2022-09-21 PROCEDURE — 25010000002 MORPHINE PER 10 MG: Performed by: INTERNAL MEDICINE

## 2022-09-21 PROCEDURE — 25010000002 LEVETIRACETAM IN NACL 0.82% 500 MG/100ML SOLUTION: Performed by: INTERNAL MEDICINE

## 2022-09-21 RX ORDER — ATORVASTATIN CALCIUM 40 MG/1
40 TABLET, FILM COATED ORAL DAILY
Status: DISCONTINUED | OUTPATIENT
Start: 2022-09-21 | End: 2022-09-25

## 2022-09-21 RX ORDER — ALBUMIN (HUMAN) 12.5 G/50ML
25 SOLUTION INTRAVENOUS AS NEEDED
Status: DISCONTINUED | OUTPATIENT
Start: 2022-09-21 | End: 2022-09-25

## 2022-09-21 RX ORDER — MIDODRINE HYDROCHLORIDE 5 MG/1
10 TABLET ORAL ONCE
Status: DISCONTINUED | OUTPATIENT
Start: 2022-09-21 | End: 2022-09-21 | Stop reason: SDUPTHER

## 2022-09-21 RX ORDER — HEPARIN SODIUM 1000 [USP'U]/ML
4500 INJECTION, SOLUTION INTRAVENOUS; SUBCUTANEOUS AS NEEDED
Status: DISCONTINUED | OUTPATIENT
Start: 2022-09-21 | End: 2022-09-25

## 2022-09-21 RX ORDER — MIDODRINE HYDROCHLORIDE 5 MG/1
10 TABLET ORAL EVERY 8 HOURS
Status: DISCONTINUED | OUTPATIENT
Start: 2022-09-21 | End: 2022-09-22

## 2022-09-21 RX ADMIN — ATORVASTATIN CALCIUM 40 MG: 40 TABLET, FILM COATED ORAL at 20:19

## 2022-09-21 RX ADMIN — PANTOPRAZOLE SODIUM 40 MG: 40 INJECTION, POWDER, FOR SOLUTION INTRAVENOUS at 12:40

## 2022-09-21 RX ADMIN — LEVETIRACETAM 500 MG: 5 INJECTION INTRAVENOUS at 12:40

## 2022-09-21 RX ADMIN — Medication 10 ML: at 20:18

## 2022-09-21 RX ADMIN — Medication 10 ML: at 20:17

## 2022-09-21 RX ADMIN — MORPHINE SULFATE 2 MG: 2 INJECTION, SOLUTION INTRAMUSCULAR; INTRAVENOUS at 23:42

## 2022-09-21 RX ADMIN — LEVETIRACETAM 500 MG: 5 INJECTION INTRAVENOUS at 20:17

## 2022-09-21 RX ADMIN — PANTOPRAZOLE SODIUM 40 MG: 40 INJECTION, POWDER, FOR SOLUTION INTRAVENOUS at 20:17

## 2022-09-21 RX ADMIN — ONDANSETRON 4 MG: 2 INJECTION INTRAMUSCULAR; INTRAVENOUS at 15:32

## 2022-09-21 RX ADMIN — ASPIRIN 300 MG: 300 SUPPOSITORY RECTAL at 12:40

## 2022-09-21 RX ADMIN — ALBUMIN (HUMAN) 25 G: 0.25 INJECTION, SOLUTION INTRAVENOUS at 09:13

## 2022-09-21 RX ADMIN — Medication 3 ML: at 20:19

## 2022-09-21 RX ADMIN — Medication 10 ML: at 12:40

## 2022-09-21 RX ADMIN — ONDANSETRON 4 MG: 2 INJECTION INTRAMUSCULAR; INTRAVENOUS at 04:10

## 2022-09-21 RX ADMIN — ONDANSETRON 4 MG: 2 INJECTION INTRAMUSCULAR; INTRAVENOUS at 21:58

## 2022-09-21 RX ADMIN — LATANOPROST 1 DROP: 50 SOLUTION/ DROPS OPHTHALMIC at 20:21

## 2022-09-21 RX ADMIN — BACLOFEN 5 MG: 10 TABLET ORAL at 20:17

## 2022-09-21 RX ADMIN — Medication 3 ML: at 12:41

## 2022-09-21 RX ADMIN — MIDODRINE HYDROCHLORIDE 10 MG: 5 TABLET ORAL at 17:37

## 2022-09-21 RX ADMIN — HEPARIN SODIUM 4500 UNITS: 1000 INJECTION INTRAVENOUS; SUBCUTANEOUS at 09:14

## 2022-09-21 RX ADMIN — Medication 10 ML: at 12:41

## 2022-09-22 ENCOUNTER — APPOINTMENT (OUTPATIENT)
Dept: CT IMAGING | Facility: HOSPITAL | Age: 77
End: 2022-09-22

## 2022-09-22 LAB
ANION GAP SERPL CALCULATED.3IONS-SCNC: 18 MMOL/L (ref 5–15)
BASOPHILS # BLD AUTO: 0 10*3/MM3 (ref 0–0.2)
BASOPHILS NFR BLD AUTO: 0.4 % (ref 0–1.5)
BUN SERPL-MCNC: 30 MG/DL (ref 8–23)
BUN/CREAT SERPL: 6.4 (ref 7–25)
CALCIUM SPEC-SCNC: 9.1 MG/DL (ref 8.6–10.5)
CHLORIDE SERPL-SCNC: 95 MMOL/L (ref 98–107)
CK SERPL-CCNC: 231 U/L (ref 20–180)
CO2 SERPL-SCNC: 19 MMOL/L (ref 22–29)
CREAT SERPL-MCNC: 4.72 MG/DL (ref 0.57–1)
DEPRECATED RDW RBC AUTO: 47.7 FL (ref 37–54)
EGFRCR SERPLBLD CKD-EPI 2021: 9.1 ML/MIN/1.73
EOSINOPHIL # BLD AUTO: 0.2 10*3/MM3 (ref 0–0.4)
EOSINOPHIL NFR BLD AUTO: 1.8 % (ref 0.3–6.2)
ERYTHROCYTE [DISTWIDTH] IN BLOOD BY AUTOMATED COUNT: 15.8 % (ref 12.3–15.4)
GLUCOSE BLDC GLUCOMTR-MCNC: 222 MG/DL (ref 70–105)
GLUCOSE BLDC GLUCOMTR-MCNC: 237 MG/DL (ref 70–105)
GLUCOSE BLDC GLUCOMTR-MCNC: 245 MG/DL (ref 70–105)
GLUCOSE BLDC GLUCOMTR-MCNC: 286 MG/DL (ref 70–105)
GLUCOSE SERPL-MCNC: 212 MG/DL (ref 65–99)
HCT VFR BLD AUTO: 31 % (ref 34–46.6)
HGB BLD-MCNC: 9.8 G/DL (ref 12–15.9)
LYMPHOCYTES # BLD AUTO: 1.6 10*3/MM3 (ref 0.7–3.1)
LYMPHOCYTES NFR BLD AUTO: 15.9 % (ref 19.6–45.3)
MCH RBC QN AUTO: 26.7 PG (ref 26.6–33)
MCHC RBC AUTO-ENTMCNC: 31.6 G/DL (ref 31.5–35.7)
MCV RBC AUTO: 84.5 FL (ref 79–97)
MONOCYTES # BLD AUTO: 1.3 10*3/MM3 (ref 0.1–0.9)
MONOCYTES NFR BLD AUTO: 13.3 % (ref 5–12)
NEUTROPHILS NFR BLD AUTO: 6.7 10*3/MM3 (ref 1.7–7)
NEUTROPHILS NFR BLD AUTO: 68.6 % (ref 42.7–76)
NRBC BLD AUTO-RTO: 0 /100 WBC (ref 0–0.2)
PLATELET # BLD AUTO: 329 10*3/MM3 (ref 140–450)
PMV BLD AUTO: 7.8 FL (ref 6–12)
POTASSIUM SERPL-SCNC: 4.1 MMOL/L (ref 3.5–5.2)
RBC # BLD AUTO: 3.67 10*6/MM3 (ref 3.77–5.28)
SODIUM SERPL-SCNC: 132 MMOL/L (ref 136–145)
WBC NRBC COR # BLD: 9.8 10*3/MM3 (ref 3.4–10.8)

## 2022-09-22 PROCEDURE — 82550 ASSAY OF CK (CPK): CPT | Performed by: INTERNAL MEDICINE

## 2022-09-22 PROCEDURE — 80048 BASIC METABOLIC PNL TOTAL CA: CPT | Performed by: INTERNAL MEDICINE

## 2022-09-22 PROCEDURE — 85025 COMPLETE CBC W/AUTO DIFF WBC: CPT | Performed by: INTERNAL MEDICINE

## 2022-09-22 PROCEDURE — 97530 THERAPEUTIC ACTIVITIES: CPT

## 2022-09-22 PROCEDURE — 92526 ORAL FUNCTION THERAPY: CPT

## 2022-09-22 PROCEDURE — 25010000002 MORPHINE PER 10 MG: Performed by: INTERNAL MEDICINE

## 2022-09-22 PROCEDURE — 99232 SBSQ HOSP IP/OBS MODERATE 35: CPT | Performed by: PSYCHIATRY & NEUROLOGY

## 2022-09-22 PROCEDURE — 82962 GLUCOSE BLOOD TEST: CPT

## 2022-09-22 PROCEDURE — 25010000002 LEVETIRACETAM IN NACL 0.82% 500 MG/100ML SOLUTION: Performed by: INTERNAL MEDICINE

## 2022-09-22 PROCEDURE — 25010000002 ONDANSETRON PER 1 MG: Performed by: INTERNAL MEDICINE

## 2022-09-22 RX ORDER — DEXTROSE AND SODIUM CHLORIDE 5; .9 G/100ML; G/100ML
75 INJECTION, SOLUTION INTRAVENOUS CONTINUOUS
Status: DISCONTINUED | OUTPATIENT
Start: 2022-09-22 | End: 2022-09-23

## 2022-09-22 RX ADMIN — ASPIRIN 300 MG: 300 SUPPOSITORY RECTAL at 08:14

## 2022-09-22 RX ADMIN — Medication 10 ML: at 20:34

## 2022-09-22 RX ADMIN — Medication 10 ML: at 20:33

## 2022-09-22 RX ADMIN — LEVETIRACETAM 500 MG: 5 INJECTION INTRAVENOUS at 08:14

## 2022-09-22 RX ADMIN — MIDODRINE HYDROCHLORIDE 10 MG: 5 TABLET ORAL at 01:09

## 2022-09-22 RX ADMIN — Medication 3 ML: at 20:34

## 2022-09-22 RX ADMIN — LATANOPROST 1 DROP: 50 SOLUTION/ DROPS OPHTHALMIC at 20:33

## 2022-09-22 RX ADMIN — LEVETIRACETAM 500 MG: 5 INJECTION INTRAVENOUS at 20:33

## 2022-09-22 RX ADMIN — MORPHINE SULFATE 2 MG: 2 INJECTION, SOLUTION INTRAMUSCULAR; INTRAVENOUS at 12:26

## 2022-09-22 RX ADMIN — ONDANSETRON 4 MG: 2 INJECTION INTRAMUSCULAR; INTRAVENOUS at 12:26

## 2022-09-22 RX ADMIN — Medication 10 ML: at 08:18

## 2022-09-22 RX ADMIN — PANTOPRAZOLE SODIUM 40 MG: 40 INJECTION, POWDER, FOR SOLUTION INTRAVENOUS at 08:14

## 2022-09-22 RX ADMIN — DEXTROSE AND SODIUM CHLORIDE 75 ML/HR: 5; 900 INJECTION, SOLUTION INTRAVENOUS at 18:11

## 2022-09-22 RX ADMIN — PANTOPRAZOLE SODIUM 40 MG: 40 INJECTION, POWDER, FOR SOLUTION INTRAVENOUS at 20:33

## 2022-09-22 RX ADMIN — Medication 3 ML: at 08:19

## 2022-09-22 RX ADMIN — Medication 10 ML: at 08:15

## 2022-09-23 ENCOUNTER — APPOINTMENT (OUTPATIENT)
Dept: CT IMAGING | Facility: HOSPITAL | Age: 77
End: 2022-09-23

## 2022-09-23 LAB
ANION GAP SERPL CALCULATED.3IONS-SCNC: 15 MMOL/L (ref 5–15)
BASOPHILS # BLD AUTO: 0 10*3/MM3 (ref 0–0.2)
BASOPHILS NFR BLD AUTO: 0.3 % (ref 0–1.5)
BUN SERPL-MCNC: 44 MG/DL (ref 8–23)
BUN/CREAT SERPL: 7.4 (ref 7–25)
CALCIUM SPEC-SCNC: 8.3 MG/DL (ref 8.6–10.5)
CHLORIDE SERPL-SCNC: 104 MMOL/L (ref 98–107)
CO2 SERPL-SCNC: 18 MMOL/L (ref 22–29)
CREAT SERPL-MCNC: 5.98 MG/DL (ref 0.57–1)
DEPRECATED RDW RBC AUTO: 50.3 FL (ref 37–54)
EGFRCR SERPLBLD CKD-EPI 2021: 6.9 ML/MIN/1.73
EOSINOPHIL # BLD AUTO: 0.1 10*3/MM3 (ref 0–0.4)
EOSINOPHIL NFR BLD AUTO: 1.7 % (ref 0.3–6.2)
ERYTHROCYTE [DISTWIDTH] IN BLOOD BY AUTOMATED COUNT: 15.8 % (ref 12.3–15.4)
FERRITIN SERPL-MCNC: 579.3 NG/ML (ref 13–150)
GLUCOSE BLDC GLUCOMTR-MCNC: 172 MG/DL (ref 70–105)
GLUCOSE BLDC GLUCOMTR-MCNC: 285 MG/DL (ref 70–105)
GLUCOSE BLDC GLUCOMTR-MCNC: 292 MG/DL (ref 70–105)
GLUCOSE SERPL-MCNC: 612 MG/DL (ref 65–99)
HCT VFR BLD AUTO: 26.1 % (ref 34–46.6)
HGB BLD-MCNC: 8 G/DL (ref 12–15.9)
IRON 24H UR-MRATE: 19 MCG/DL (ref 37–145)
IRON SATN MFR SERPL: 11 % (ref 20–50)
LYMPHOCYTES # BLD AUTO: 0.8 10*3/MM3 (ref 0.7–3.1)
LYMPHOCYTES NFR BLD AUTO: 11.4 % (ref 19.6–45.3)
MCH RBC QN AUTO: 26.9 PG (ref 26.6–33)
MCHC RBC AUTO-ENTMCNC: 30.6 G/DL (ref 31.5–35.7)
MCV RBC AUTO: 88 FL (ref 79–97)
MONOCYTES # BLD AUTO: 1 10*3/MM3 (ref 0.1–0.9)
MONOCYTES NFR BLD AUTO: 14.7 % (ref 5–12)
NEUTROPHILS NFR BLD AUTO: 5 10*3/MM3 (ref 1.7–7)
NEUTROPHILS NFR BLD AUTO: 71.9 % (ref 42.7–76)
NRBC BLD AUTO-RTO: 0 /100 WBC (ref 0–0.2)
PLATELET # BLD AUTO: 266 10*3/MM3 (ref 140–450)
PMV BLD AUTO: 8 FL (ref 6–12)
POTASSIUM SERPL-SCNC: 3.3 MMOL/L (ref 3.5–5.2)
RBC # BLD AUTO: 2.97 10*6/MM3 (ref 3.77–5.28)
SODIUM SERPL-SCNC: 137 MMOL/L (ref 136–145)
TIBC SERPL-MCNC: 173 MCG/DL (ref 298–536)
TRANSFERRIN SERPL-MCNC: 116 MG/DL (ref 200–360)
WBC NRBC COR # BLD: 6.9 10*3/MM3 (ref 3.4–10.8)

## 2022-09-23 PROCEDURE — 85025 COMPLETE CBC W/AUTO DIFF WBC: CPT | Performed by: INTERNAL MEDICINE

## 2022-09-23 PROCEDURE — 25010000002 ONDANSETRON PER 1 MG: Performed by: INTERNAL MEDICINE

## 2022-09-23 PROCEDURE — 25010000002 EPOETIN ALFA-EPBX 10000 UNIT/ML SOLUTION: Performed by: INTERNAL MEDICINE

## 2022-09-23 PROCEDURE — 25010000002 METOCLOPRAMIDE PER 10 MG: Performed by: INTERNAL MEDICINE

## 2022-09-23 PROCEDURE — 25010000002 LEVETIRACETAM IN NACL 0.82% 500 MG/100ML SOLUTION: Performed by: INTERNAL MEDICINE

## 2022-09-23 PROCEDURE — 82728 ASSAY OF FERRITIN: CPT | Performed by: INTERNAL MEDICINE

## 2022-09-23 PROCEDURE — 80048 BASIC METABOLIC PNL TOTAL CA: CPT | Performed by: INTERNAL MEDICINE

## 2022-09-23 PROCEDURE — 70450 CT HEAD/BRAIN W/O DYE: CPT

## 2022-09-23 PROCEDURE — 25010000002 HEPARIN (PORCINE) PER 1000 UNITS: Performed by: INTERNAL MEDICINE

## 2022-09-23 PROCEDURE — 82962 GLUCOSE BLOOD TEST: CPT

## 2022-09-23 PROCEDURE — 99232 SBSQ HOSP IP/OBS MODERATE 35: CPT | Performed by: PSYCHIATRY & NEUROLOGY

## 2022-09-23 PROCEDURE — 84466 ASSAY OF TRANSFERRIN: CPT | Performed by: INTERNAL MEDICINE

## 2022-09-23 PROCEDURE — 99221 1ST HOSP IP/OBS SF/LOW 40: CPT | Performed by: NURSE PRACTITIONER

## 2022-09-23 PROCEDURE — 83540 ASSAY OF IRON: CPT | Performed by: INTERNAL MEDICINE

## 2022-09-23 PROCEDURE — 25010000002 MORPHINE PER 10 MG: Performed by: INTERNAL MEDICINE

## 2022-09-23 RX ADMIN — HEPARIN SODIUM 4500 UNITS: 1000 INJECTION INTRAVENOUS; SUBCUTANEOUS at 14:32

## 2022-09-23 RX ADMIN — Medication 10 ML: at 20:38

## 2022-09-23 RX ADMIN — MORPHINE SULFATE 2 MG: 2 INJECTION, SOLUTION INTRAMUSCULAR; INTRAVENOUS at 19:06

## 2022-09-23 RX ADMIN — Medication 3 ML: at 09:11

## 2022-09-23 RX ADMIN — Medication 3 ML: at 20:39

## 2022-09-23 RX ADMIN — Medication 10 ML: at 09:11

## 2022-09-23 RX ADMIN — LATANOPROST 1 DROP: 50 SOLUTION/ DROPS OPHTHALMIC at 20:38

## 2022-09-23 RX ADMIN — PANTOPRAZOLE SODIUM 40 MG: 40 INJECTION, POWDER, FOR SOLUTION INTRAVENOUS at 09:10

## 2022-09-23 RX ADMIN — METOCLOPRAMIDE HYDROCHLORIDE 5 MG: 5 INJECTION INTRAMUSCULAR; INTRAVENOUS at 16:13

## 2022-09-23 RX ADMIN — PANTOPRAZOLE SODIUM 40 MG: 40 INJECTION, POWDER, FOR SOLUTION INTRAVENOUS at 20:38

## 2022-09-23 RX ADMIN — LEVETIRACETAM 500 MG: 5 INJECTION INTRAVENOUS at 20:38

## 2022-09-23 RX ADMIN — LEVETIRACETAM 500 MG: 5 INJECTION INTRAVENOUS at 09:10

## 2022-09-23 RX ADMIN — ONDANSETRON 4 MG: 2 INJECTION INTRAMUSCULAR; INTRAVENOUS at 16:13

## 2022-09-23 RX ADMIN — EPOETIN ALFA-EPBX 20000 UNITS: 10000 INJECTION, SOLUTION INTRAVENOUS; SUBCUTANEOUS at 16:07

## 2022-09-24 LAB
BASOPHILS # BLD AUTO: 0 10*3/MM3 (ref 0–0.2)
BASOPHILS NFR BLD AUTO: 0.3 % (ref 0–1.5)
DEPRECATED RDW RBC AUTO: 45.9 FL (ref 37–54)
EOSINOPHIL # BLD AUTO: 0.1 10*3/MM3 (ref 0–0.4)
EOSINOPHIL NFR BLD AUTO: 0.9 % (ref 0.3–6.2)
ERYTHROCYTE [DISTWIDTH] IN BLOOD BY AUTOMATED COUNT: 15.2 % (ref 12.3–15.4)
GLUCOSE BLDC GLUCOMTR-MCNC: 171 MG/DL (ref 70–105)
GLUCOSE BLDC GLUCOMTR-MCNC: 172 MG/DL (ref 70–105)
GLUCOSE BLDC GLUCOMTR-MCNC: 180 MG/DL (ref 70–105)
HCT VFR BLD AUTO: 28.8 % (ref 34–46.6)
HGB BLD-MCNC: 9 G/DL (ref 12–15.9)
LYMPHOCYTES # BLD AUTO: 1.5 10*3/MM3 (ref 0.7–3.1)
LYMPHOCYTES NFR BLD AUTO: 14.6 % (ref 19.6–45.3)
MCH RBC QN AUTO: 27 PG (ref 26.6–33)
MCHC RBC AUTO-ENTMCNC: 31.4 G/DL (ref 31.5–35.7)
MCV RBC AUTO: 86.1 FL (ref 79–97)
MONOCYTES # BLD AUTO: 1.3 10*3/MM3 (ref 0.1–0.9)
MONOCYTES NFR BLD AUTO: 13 % (ref 5–12)
NEUTROPHILS NFR BLD AUTO: 7.2 10*3/MM3 (ref 1.7–7)
NEUTROPHILS NFR BLD AUTO: 71.2 % (ref 42.7–76)
NRBC BLD AUTO-RTO: 0.1 /100 WBC (ref 0–0.2)
PLATELET # BLD AUTO: 300 10*3/MM3 (ref 140–450)
PMV BLD AUTO: 8.4 FL (ref 6–12)
RBC # BLD AUTO: 3.35 10*6/MM3 (ref 3.77–5.28)
WBC NRBC COR # BLD: 10.1 10*3/MM3 (ref 3.4–10.8)

## 2022-09-24 PROCEDURE — 85025 COMPLETE CBC W/AUTO DIFF WBC: CPT | Performed by: INTERNAL MEDICINE

## 2022-09-24 PROCEDURE — 82962 GLUCOSE BLOOD TEST: CPT

## 2022-09-24 PROCEDURE — 25010000002 MORPHINE PER 10 MG: Performed by: INTERNAL MEDICINE

## 2022-09-24 PROCEDURE — 25010000002 ONDANSETRON PER 1 MG: Performed by: INTERNAL MEDICINE

## 2022-09-24 PROCEDURE — 25010000002 LEVETIRACETAM IN NACL 0.82% 500 MG/100ML SOLUTION: Performed by: INTERNAL MEDICINE

## 2022-09-24 RX ORDER — ALBUMIN (HUMAN) 12.5 G/50ML
25 SOLUTION INTRAVENOUS AS NEEDED
Status: ACTIVE | OUTPATIENT
Start: 2022-09-24 | End: 2022-09-24

## 2022-09-24 RX ADMIN — ONDANSETRON 4 MG: 2 INJECTION INTRAMUSCULAR; INTRAVENOUS at 16:01

## 2022-09-24 RX ADMIN — PANTOPRAZOLE SODIUM 40 MG: 40 INJECTION, POWDER, FOR SOLUTION INTRAVENOUS at 21:42

## 2022-09-24 RX ADMIN — LEVETIRACETAM 500 MG: 5 INJECTION INTRAVENOUS at 21:42

## 2022-09-24 RX ADMIN — LATANOPROST 1 DROP: 50 SOLUTION/ DROPS OPHTHALMIC at 21:43

## 2022-09-24 RX ADMIN — Medication 10 ML: at 21:42

## 2022-09-24 RX ADMIN — MORPHINE SULFATE 2 MG: 2 INJECTION, SOLUTION INTRAMUSCULAR; INTRAVENOUS at 21:42

## 2022-09-24 RX ADMIN — Medication 10 ML: at 21:44

## 2022-09-24 RX ADMIN — Medication 10 ML: at 16:05

## 2022-09-24 RX ADMIN — LEVETIRACETAM 500 MG: 5 INJECTION INTRAVENOUS at 10:19

## 2022-09-24 RX ADMIN — MORPHINE SULFATE 2 MG: 2 INJECTION, SOLUTION INTRAMUSCULAR; INTRAVENOUS at 10:27

## 2022-09-24 RX ADMIN — ONDANSETRON 4 MG: 2 INJECTION INTRAMUSCULAR; INTRAVENOUS at 10:27

## 2022-09-24 RX ADMIN — PANTOPRAZOLE SODIUM 40 MG: 40 INJECTION, POWDER, FOR SOLUTION INTRAVENOUS at 10:19

## 2022-09-24 RX ADMIN — MORPHINE SULFATE 2 MG: 2 INJECTION, SOLUTION INTRAMUSCULAR; INTRAVENOUS at 16:01

## 2022-09-24 RX ADMIN — ONDANSETRON 4 MG: 2 INJECTION INTRAMUSCULAR; INTRAVENOUS at 21:42

## 2022-09-25 ENCOUNTER — HOSPITAL ENCOUNTER (INPATIENT)
Facility: HOSPITAL | Age: 77
LOS: 3 days | Discharge: NURSING, REHAB OR LONG-TERM CARE | End: 2022-09-28
Attending: INTERNAL MEDICINE | Admitting: INTERNAL MEDICINE

## 2022-09-25 VITALS
HEART RATE: 76 BPM | DIASTOLIC BLOOD PRESSURE: 33 MMHG | HEIGHT: 67 IN | TEMPERATURE: 99.6 F | SYSTOLIC BLOOD PRESSURE: 123 MMHG | BODY MASS INDEX: 35.92 KG/M2 | RESPIRATION RATE: 14 BRPM | WEIGHT: 228.84 LBS | OXYGEN SATURATION: 94 %

## 2022-09-25 LAB
GLUCOSE BLDC GLUCOMTR-MCNC: 164 MG/DL (ref 70–105)
GLUCOSE BLDC GLUCOMTR-MCNC: 185 MG/DL (ref 70–105)

## 2022-09-25 PROCEDURE — 25010000002 MORPHINE PER 10 MG: Performed by: HOSPITALIST

## 2022-09-25 PROCEDURE — 25010000002 ONDANSETRON PER 1 MG: Performed by: INTERNAL MEDICINE

## 2022-09-25 PROCEDURE — 82962 GLUCOSE BLOOD TEST: CPT

## 2022-09-25 PROCEDURE — 25010000002 ONDANSETRON PER 1 MG: Performed by: HOSPITALIST

## 2022-09-25 PROCEDURE — 25010000002 METOCLOPRAMIDE PER 10 MG: Performed by: INTERNAL MEDICINE

## 2022-09-25 PROCEDURE — 25010000002 MORPHINE PER 10 MG: Performed by: INTERNAL MEDICINE

## 2022-09-25 PROCEDURE — 25010000002 LEVETIRACETAM IN NACL 0.82% 500 MG/100ML SOLUTION: Performed by: INTERNAL MEDICINE

## 2022-09-25 RX ORDER — SCOLOPAMINE TRANSDERMAL SYSTEM 1 MG/1
1 PATCH, EXTENDED RELEASE TRANSDERMAL
Status: CANCELLED | OUTPATIENT
Start: 2022-09-25

## 2022-09-25 RX ORDER — GLYCOPYRROLATE 0.2 MG/ML
0.4 INJECTION INTRAMUSCULAR; INTRAVENOUS
Status: DISCONTINUED | OUTPATIENT
Start: 2022-09-25 | End: 2022-09-25 | Stop reason: HOSPADM

## 2022-09-25 RX ORDER — HALOPERIDOL 5 MG/ML
1 INJECTION INTRAMUSCULAR EVERY 4 HOURS PRN
Status: CANCELLED | OUTPATIENT
Start: 2022-09-25

## 2022-09-25 RX ORDER — ACETAMINOPHEN 650 MG/1
650 SUPPOSITORY RECTAL EVERY 4 HOURS PRN
Status: CANCELLED | OUTPATIENT
Start: 2022-09-25

## 2022-09-25 RX ORDER — ACETAMINOPHEN 325 MG/1
650 TABLET ORAL EVERY 4 HOURS PRN
Status: CANCELLED | OUTPATIENT
Start: 2022-09-25

## 2022-09-25 RX ORDER — HALOPERIDOL 5 MG/ML
1 INJECTION INTRAMUSCULAR EVERY 4 HOURS PRN
Status: DISCONTINUED | OUTPATIENT
Start: 2022-09-25 | End: 2022-09-28 | Stop reason: HOSPADM

## 2022-09-25 RX ORDER — SCOLOPAMINE TRANSDERMAL SYSTEM 1 MG/1
1 PATCH, EXTENDED RELEASE TRANSDERMAL
Status: DISCONTINUED | OUTPATIENT
Start: 2022-09-25 | End: 2022-09-28 | Stop reason: HOSPADM

## 2022-09-25 RX ORDER — SODIUM CHLORIDE 0.9 % (FLUSH) 0.9 %
10 SYRINGE (ML) INJECTION AS NEEDED
Status: CANCELLED | OUTPATIENT
Start: 2022-09-25

## 2022-09-25 RX ORDER — SODIUM CHLORIDE 0.9 % (FLUSH) 0.9 %
20 SYRINGE (ML) INJECTION AS NEEDED
Status: DISCONTINUED | OUTPATIENT
Start: 2022-09-25 | End: 2022-09-28 | Stop reason: HOSPADM

## 2022-09-25 RX ORDER — HALOPERIDOL 5 MG/ML
1 INJECTION INTRAMUSCULAR EVERY 4 HOURS PRN
Status: DISCONTINUED | OUTPATIENT
Start: 2022-09-25 | End: 2022-09-25 | Stop reason: HOSPADM

## 2022-09-25 RX ORDER — DIPHENHYDRAMINE HCL 25 MG
25 CAPSULE ORAL EVERY 6 HOURS PRN
Status: CANCELLED | OUTPATIENT
Start: 2022-09-25

## 2022-09-25 RX ORDER — HALOPERIDOL 1 MG/1
1 TABLET ORAL EVERY 4 HOURS PRN
Status: DISCONTINUED | OUTPATIENT
Start: 2022-09-25 | End: 2022-09-25 | Stop reason: HOSPADM

## 2022-09-25 RX ORDER — GLYCOPYRROLATE 0.2 MG/ML
0.4 INJECTION INTRAMUSCULAR; INTRAVENOUS
Status: CANCELLED | OUTPATIENT
Start: 2022-09-25

## 2022-09-25 RX ORDER — ACETAMINOPHEN 160 MG/5ML
650 SOLUTION ORAL EVERY 4 HOURS PRN
Status: DISCONTINUED | OUTPATIENT
Start: 2022-09-25 | End: 2022-09-25 | Stop reason: HOSPADM

## 2022-09-25 RX ORDER — SODIUM CHLORIDE 0.9 % (FLUSH) 0.9 %
3-10 SYRINGE (ML) INJECTION AS NEEDED
Status: DISCONTINUED | OUTPATIENT
Start: 2022-09-25 | End: 2022-09-28 | Stop reason: HOSPADM

## 2022-09-25 RX ORDER — GLYCOPYRROLATE 0.2 MG/ML
0.2 INJECTION INTRAMUSCULAR; INTRAVENOUS
Status: DISCONTINUED | OUTPATIENT
Start: 2022-09-25 | End: 2022-09-25 | Stop reason: HOSPADM

## 2022-09-25 RX ORDER — ONDANSETRON 2 MG/ML
4 INJECTION INTRAMUSCULAR; INTRAVENOUS EVERY 6 HOURS PRN
Status: CANCELLED | OUTPATIENT
Start: 2022-09-25

## 2022-09-25 RX ORDER — GLYCOPYRROLATE 0.2 MG/ML
0.2 INJECTION INTRAMUSCULAR; INTRAVENOUS
Status: DISCONTINUED | OUTPATIENT
Start: 2022-09-25 | End: 2022-09-26

## 2022-09-25 RX ORDER — CARBOXYMETHYLCELLULOSE SODIUM 10 MG/ML
1 GEL OPHTHALMIC
Status: DISCONTINUED | OUTPATIENT
Start: 2022-09-25 | End: 2022-09-25 | Stop reason: HOSPADM

## 2022-09-25 RX ORDER — ACETAMINOPHEN 325 MG/1
650 TABLET ORAL EVERY 4 HOURS PRN
Status: DISCONTINUED | OUTPATIENT
Start: 2022-09-25 | End: 2022-09-25 | Stop reason: SDUPTHER

## 2022-09-25 RX ORDER — SODIUM CHLORIDE 0.9 % (FLUSH) 0.9 %
3-10 SYRINGE (ML) INJECTION AS NEEDED
Status: CANCELLED | OUTPATIENT
Start: 2022-09-25

## 2022-09-25 RX ORDER — GLYCOPYRROLATE 0.2 MG/ML
0.2 INJECTION INTRAMUSCULAR; INTRAVENOUS
Status: CANCELLED | OUTPATIENT
Start: 2022-09-25

## 2022-09-25 RX ORDER — ACETAMINOPHEN 650 MG/1
650 SUPPOSITORY RECTAL EVERY 4 HOURS PRN
Status: DISCONTINUED | OUTPATIENT
Start: 2022-09-25 | End: 2022-09-25 | Stop reason: HOSPADM

## 2022-09-25 RX ORDER — ACETAMINOPHEN 325 MG/1
650 TABLET ORAL EVERY 4 HOURS PRN
Status: DISCONTINUED | OUTPATIENT
Start: 2022-09-25 | End: 2022-09-25 | Stop reason: HOSPADM

## 2022-09-25 RX ORDER — SCOLOPAMINE TRANSDERMAL SYSTEM 1 MG/1
1 PATCH, EXTENDED RELEASE TRANSDERMAL
Status: DISCONTINUED | OUTPATIENT
Start: 2022-09-25 | End: 2022-09-25 | Stop reason: HOSPADM

## 2022-09-25 RX ORDER — ACETAMINOPHEN 650 MG/1
650 SUPPOSITORY RECTAL EVERY 4 HOURS PRN
Status: DISCONTINUED | OUTPATIENT
Start: 2022-09-25 | End: 2022-09-25 | Stop reason: SDUPTHER

## 2022-09-25 RX ORDER — ACETAMINOPHEN 160 MG/5ML
650 SOLUTION ORAL EVERY 4 HOURS PRN
Status: CANCELLED | OUTPATIENT
Start: 2022-09-25

## 2022-09-25 RX ORDER — ACETAMINOPHEN 160 MG/5ML
650 SOLUTION ORAL EVERY 4 HOURS PRN
Status: DISCONTINUED | OUTPATIENT
Start: 2022-09-25 | End: 2022-09-25 | Stop reason: SDUPTHER

## 2022-09-25 RX ORDER — DIPHENOXYLATE HYDROCHLORIDE AND ATROPINE SULFATE 2.5; .025 MG/1; MG/1
1 TABLET ORAL
Status: DISCONTINUED | OUTPATIENT
Start: 2022-09-25 | End: 2022-09-25 | Stop reason: HOSPADM

## 2022-09-25 RX ORDER — GLYCOPYRROLATE 0.2 MG/ML
0.4 INJECTION INTRAMUSCULAR; INTRAVENOUS
Status: DISCONTINUED | OUTPATIENT
Start: 2022-09-25 | End: 2022-09-26

## 2022-09-25 RX ORDER — HALOPERIDOL 2 MG/1
1 TABLET ORAL EVERY 4 HOURS PRN
Status: DISCONTINUED | OUTPATIENT
Start: 2022-09-25 | End: 2022-09-28 | Stop reason: HOSPADM

## 2022-09-25 RX ORDER — SODIUM CHLORIDE 0.9 % (FLUSH) 0.9 %
10 SYRINGE (ML) INJECTION AS NEEDED
Status: DISCONTINUED | OUTPATIENT
Start: 2022-09-25 | End: 2022-09-28 | Stop reason: HOSPADM

## 2022-09-25 RX ORDER — CARBOXYMETHYLCELLULOSE SODIUM 10 MG/ML
1 GEL OPHTHALMIC
Status: CANCELLED | OUTPATIENT
Start: 2022-09-25

## 2022-09-25 RX ORDER — MORPHINE SULFATE 2 MG/ML
2 INJECTION, SOLUTION INTRAMUSCULAR; INTRAVENOUS
Status: CANCELLED | OUTPATIENT
Start: 2022-09-25 | End: 2022-09-27

## 2022-09-25 RX ORDER — SODIUM CHLORIDE 0.9 % (FLUSH) 0.9 %
20 SYRINGE (ML) INJECTION AS NEEDED
Status: CANCELLED | OUTPATIENT
Start: 2022-09-25

## 2022-09-25 RX ORDER — DIPHENHYDRAMINE HCL 25 MG
25 CAPSULE ORAL EVERY 6 HOURS PRN
Status: DISCONTINUED | OUTPATIENT
Start: 2022-09-25 | End: 2022-09-28 | Stop reason: HOSPADM

## 2022-09-25 RX ORDER — DIPHENHYDRAMINE HYDROCHLORIDE 50 MG/ML
25 INJECTION INTRAMUSCULAR; INTRAVENOUS EVERY 6 HOURS PRN
Status: DISCONTINUED | OUTPATIENT
Start: 2022-09-25 | End: 2022-09-28 | Stop reason: HOSPADM

## 2022-09-25 RX ORDER — DIPHENHYDRAMINE HCL 25 MG
25 CAPSULE ORAL EVERY 6 HOURS PRN
Status: DISCONTINUED | OUTPATIENT
Start: 2022-09-25 | End: 2022-09-25 | Stop reason: HOSPADM

## 2022-09-25 RX ORDER — ACETAMINOPHEN 325 MG/1
650 TABLET ORAL EVERY 4 HOURS PRN
Status: DISCONTINUED | OUTPATIENT
Start: 2022-09-25 | End: 2022-09-28 | Stop reason: HOSPADM

## 2022-09-25 RX ORDER — DIPHENOXYLATE HYDROCHLORIDE AND ATROPINE SULFATE 2.5; .025 MG/1; MG/1
1 TABLET ORAL
Status: CANCELLED | OUTPATIENT
Start: 2022-09-25

## 2022-09-25 RX ORDER — DIPHENHYDRAMINE HYDROCHLORIDE 50 MG/ML
25 INJECTION INTRAMUSCULAR; INTRAVENOUS EVERY 6 HOURS PRN
Status: DISCONTINUED | OUTPATIENT
Start: 2022-09-25 | End: 2022-09-25 | Stop reason: HOSPADM

## 2022-09-25 RX ORDER — DIPHENHYDRAMINE HYDROCHLORIDE 50 MG/ML
25 INJECTION INTRAMUSCULAR; INTRAVENOUS EVERY 6 HOURS PRN
Status: CANCELLED | OUTPATIENT
Start: 2022-09-25

## 2022-09-25 RX ORDER — HALOPERIDOL 1 MG/1
1 TABLET ORAL EVERY 4 HOURS PRN
Status: CANCELLED | OUTPATIENT
Start: 2022-09-25

## 2022-09-25 RX ORDER — ACETAMINOPHEN 160 MG/5ML
650 SOLUTION ORAL EVERY 4 HOURS PRN
Status: DISCONTINUED | OUTPATIENT
Start: 2022-09-25 | End: 2022-09-28 | Stop reason: HOSPADM

## 2022-09-25 RX ORDER — CARBOXYMETHYLCELLULOSE SODIUM 10 MG/ML
1 GEL OPHTHALMIC
Status: DISCONTINUED | OUTPATIENT
Start: 2022-09-25 | End: 2022-09-28 | Stop reason: HOSPADM

## 2022-09-25 RX ORDER — ONDANSETRON 2 MG/ML
4 INJECTION INTRAMUSCULAR; INTRAVENOUS EVERY 6 HOURS PRN
Status: DISCONTINUED | OUTPATIENT
Start: 2022-09-25 | End: 2022-09-28 | Stop reason: HOSPADM

## 2022-09-25 RX ORDER — MORPHINE SULFATE 2 MG/ML
2 INJECTION, SOLUTION INTRAMUSCULAR; INTRAVENOUS
Status: DISCONTINUED | OUTPATIENT
Start: 2022-09-25 | End: 2022-09-26

## 2022-09-25 RX ORDER — ACETAMINOPHEN 650 MG/1
650 SUPPOSITORY RECTAL EVERY 4 HOURS PRN
Status: DISCONTINUED | OUTPATIENT
Start: 2022-09-25 | End: 2022-09-28 | Stop reason: HOSPADM

## 2022-09-25 RX ORDER — DIPHENOXYLATE HYDROCHLORIDE AND ATROPINE SULFATE 2.5; .025 MG/1; MG/1
1 TABLET ORAL
Status: DISCONTINUED | OUTPATIENT
Start: 2022-09-25 | End: 2022-09-28 | Stop reason: HOSPADM

## 2022-09-25 RX ADMIN — Medication 10 ML: at 08:46

## 2022-09-25 RX ADMIN — ONDANSETRON 4 MG: 2 INJECTION INTRAMUSCULAR; INTRAVENOUS at 23:37

## 2022-09-25 RX ADMIN — MORPHINE SULFATE 2 MG: 2 INJECTION, SOLUTION INTRAMUSCULAR; INTRAVENOUS at 16:40

## 2022-09-25 RX ADMIN — Medication 3 ML: at 10:21

## 2022-09-25 RX ADMIN — LEVETIRACETAM 500 MG: 5 INJECTION INTRAVENOUS at 08:46

## 2022-09-25 RX ADMIN — ONDANSETRON 4 MG: 2 INJECTION INTRAMUSCULAR; INTRAVENOUS at 04:42

## 2022-09-25 RX ADMIN — Medication 10 ML: at 10:15

## 2022-09-25 RX ADMIN — METOCLOPRAMIDE HYDROCHLORIDE 5 MG: 5 INJECTION INTRAMUSCULAR; INTRAVENOUS at 04:46

## 2022-09-25 RX ADMIN — MORPHINE SULFATE 2 MG: 2 INJECTION, SOLUTION INTRAMUSCULAR; INTRAVENOUS at 11:06

## 2022-09-25 RX ADMIN — ONDANSETRON 4 MG: 2 INJECTION INTRAMUSCULAR; INTRAVENOUS at 16:41

## 2022-09-25 RX ADMIN — Medication 10 ML: at 08:10

## 2022-09-25 RX ADMIN — ONDANSETRON 4 MG: 2 INJECTION INTRAMUSCULAR; INTRAVENOUS at 11:05

## 2022-09-25 RX ADMIN — MORPHINE SULFATE 2 MG: 2 INJECTION, SOLUTION INTRAMUSCULAR; INTRAVENOUS at 04:46

## 2022-09-25 RX ADMIN — MORPHINE SULFATE 2 MG: 2 INJECTION, SOLUTION INTRAMUSCULAR; INTRAVENOUS at 23:37

## 2022-09-25 RX ADMIN — MORPHINE SULFATE 2 MG: 2 INJECTION, SOLUTION INTRAMUSCULAR; INTRAVENOUS at 08:08

## 2022-09-25 RX ADMIN — MORPHINE SULFATE 2 MG: 2 INJECTION, SOLUTION INTRAMUSCULAR; INTRAVENOUS at 20:49

## 2022-09-25 RX ADMIN — MORPHINE SULFATE 2 MG: 2 INJECTION, SOLUTION INTRAMUSCULAR; INTRAVENOUS at 13:58

## 2022-09-25 RX ADMIN — PANTOPRAZOLE SODIUM 40 MG: 40 INJECTION, POWDER, FOR SOLUTION INTRAVENOUS at 08:45

## 2022-09-26 PROBLEM — Z51.5 END OF LIFE CARE: Status: ACTIVE | Noted: 2022-09-26

## 2022-09-26 PROCEDURE — 25010000002 ONDANSETRON PER 1 MG: Performed by: HOSPITALIST

## 2022-09-26 PROCEDURE — 25010000002 MORPHINE PER 10 MG: Performed by: HOSPITALIST

## 2022-09-26 PROCEDURE — 25010000002 MORPHINE PER 10 MG: Performed by: INTERNAL MEDICINE

## 2022-09-26 RX ORDER — GLYCOPYRROLATE 0.2 MG/ML
0.2 INJECTION INTRAMUSCULAR; INTRAVENOUS
Status: DISCONTINUED | OUTPATIENT
Start: 2022-09-26 | End: 2022-09-28 | Stop reason: HOSPADM

## 2022-09-26 RX ORDER — MORPHINE SULFATE 2 MG/ML
2 INJECTION, SOLUTION INTRAMUSCULAR; INTRAVENOUS
Status: DISCONTINUED | OUTPATIENT
Start: 2022-09-26 | End: 2022-09-27

## 2022-09-26 RX ORDER — GLYCOPYRROLATE 0.2 MG/ML
0.4 INJECTION INTRAMUSCULAR; INTRAVENOUS
Status: DISCONTINUED | OUTPATIENT
Start: 2022-09-26 | End: 2022-09-28 | Stop reason: HOSPADM

## 2022-09-26 RX ADMIN — MORPHINE SULFATE 2 MG: 2 INJECTION, SOLUTION INTRAMUSCULAR; INTRAVENOUS at 02:56

## 2022-09-26 RX ADMIN — MORPHINE SULFATE 2 MG: 2 INJECTION, SOLUTION INTRAMUSCULAR; INTRAVENOUS at 14:32

## 2022-09-26 RX ADMIN — ONDANSETRON 4 MG: 2 INJECTION INTRAMUSCULAR; INTRAVENOUS at 06:02

## 2022-09-26 RX ADMIN — GLYCOPYRROLATE 0.2 MG: 0.2 INJECTION INTRAMUSCULAR; INTRAVENOUS at 11:48

## 2022-09-26 RX ADMIN — GLYCOPYRROLATE 0.2 MG: 0.2 INJECTION INTRAMUSCULAR; INTRAVENOUS at 20:12

## 2022-09-26 RX ADMIN — ONDANSETRON 4 MG: 2 INJECTION INTRAMUSCULAR; INTRAVENOUS at 13:42

## 2022-09-26 RX ADMIN — GLYCOPYRROLATE 0.2 MG: 0.2 INJECTION INTRAMUSCULAR; INTRAVENOUS at 22:42

## 2022-09-26 RX ADMIN — MORPHINE SULFATE 2 MG: 2 INJECTION, SOLUTION INTRAMUSCULAR; INTRAVENOUS at 20:12

## 2022-09-26 RX ADMIN — GLYCOPYRROLATE 0.2 MG: 0.2 INJECTION INTRAMUSCULAR; INTRAVENOUS at 14:31

## 2022-09-26 RX ADMIN — MORPHINE SULFATE 2 MG: 2 INJECTION, SOLUTION INTRAMUSCULAR; INTRAVENOUS at 06:02

## 2022-09-26 RX ADMIN — GLYCOPYRROLATE 0.2 MG: 0.2 INJECTION INTRAMUSCULAR; INTRAVENOUS at 18:29

## 2022-09-26 RX ADMIN — GLYCOPYRROLATE 0.2 MG: 0.2 INJECTION INTRAMUSCULAR; INTRAVENOUS at 16:28

## 2022-09-26 RX ADMIN — MORPHINE SULFATE 2 MG: 2 INJECTION, SOLUTION INTRAMUSCULAR; INTRAVENOUS at 22:42

## 2022-09-26 RX ADMIN — MORPHINE SULFATE 2 MG: 2 INJECTION, SOLUTION INTRAMUSCULAR; INTRAVENOUS at 17:21

## 2022-09-26 RX ADMIN — MORPHINE SULFATE 2 MG: 2 INJECTION, SOLUTION INTRAMUSCULAR; INTRAVENOUS at 11:26

## 2022-09-27 VITALS
DIASTOLIC BLOOD PRESSURE: 61 MMHG | HEART RATE: 82 BPM | TEMPERATURE: 99.4 F | RESPIRATION RATE: 18 BRPM | OXYGEN SATURATION: 96 % | SYSTOLIC BLOOD PRESSURE: 94 MMHG

## 2022-09-27 PROCEDURE — 25010000002 MORPHINE PER 10 MG: Performed by: INTERNAL MEDICINE

## 2022-09-27 RX ORDER — LORAZEPAM 2 MG/ML
0.5 CONCENTRATE ORAL EVERY 8 HOURS
Status: DISCONTINUED | OUTPATIENT
Start: 2022-09-27 | End: 2022-09-28 | Stop reason: HOSPADM

## 2022-09-27 RX ORDER — MORPHINE SULFATE 4 MG/ML
4 INJECTION, SOLUTION INTRAMUSCULAR; INTRAVENOUS
Status: DISCONTINUED | OUTPATIENT
Start: 2022-09-27 | End: 2022-09-28 | Stop reason: HOSPADM

## 2022-09-27 RX ADMIN — MORPHINE SULFATE 2 MG: 2 INJECTION, SOLUTION INTRAMUSCULAR; INTRAVENOUS at 08:20

## 2022-09-27 RX ADMIN — GLYCOPYRROLATE 0.2 MG: 0.2 INJECTION INTRAMUSCULAR; INTRAVENOUS at 04:13

## 2022-09-27 RX ADMIN — MORPHINE SULFATE 4 MG: 4 INJECTION, SOLUTION INTRAMUSCULAR; INTRAVENOUS at 22:15

## 2022-09-27 RX ADMIN — GLYCOPYRROLATE 0.2 MG: 0.2 INJECTION INTRAMUSCULAR; INTRAVENOUS at 20:18

## 2022-09-27 RX ADMIN — MORPHINE SULFATE 4 MG: 4 INJECTION, SOLUTION INTRAMUSCULAR; INTRAVENOUS at 16:09

## 2022-09-27 RX ADMIN — MORPHINE SULFATE 2 MG: 2 INJECTION, SOLUTION INTRAMUSCULAR; INTRAVENOUS at 11:26

## 2022-09-27 RX ADMIN — GLYCOPYRROLATE 0.2 MG: 0.2 INJECTION INTRAMUSCULAR; INTRAVENOUS at 05:52

## 2022-09-27 RX ADMIN — GLYCOPYRROLATE 0.2 MG: 0.2 INJECTION INTRAMUSCULAR; INTRAVENOUS at 22:15

## 2022-09-27 RX ADMIN — MORPHINE SULFATE 2 MG: 2 INJECTION, SOLUTION INTRAMUSCULAR; INTRAVENOUS at 05:52

## 2022-09-27 RX ADMIN — LORAZEPAM 0.5 MG: 2 LIQUID ORAL at 22:16

## 2022-09-27 RX ADMIN — GLYCOPYRROLATE 0.2 MG: 0.2 INJECTION INTRAMUSCULAR; INTRAVENOUS at 11:26

## 2022-09-27 RX ADMIN — GLYCOPYRROLATE 0.4 MG: 0.2 INJECTION INTRAMUSCULAR; INTRAVENOUS at 18:02

## 2022-09-27 RX ADMIN — LORAZEPAM 0.5 MG: 2 LIQUID ORAL at 13:58

## 2022-09-27 RX ADMIN — GLYCOPYRROLATE 0.2 MG: 0.2 INJECTION INTRAMUSCULAR; INTRAVENOUS at 13:59

## 2022-09-27 RX ADMIN — MORPHINE SULFATE 4 MG: 4 INJECTION, SOLUTION INTRAMUSCULAR; INTRAVENOUS at 20:18

## 2022-09-27 RX ADMIN — MORPHINE SULFATE 2 MG: 2 INJECTION, SOLUTION INTRAMUSCULAR; INTRAVENOUS at 02:28

## 2022-09-27 RX ADMIN — GLYCOPYRROLATE 0.2 MG: 0.2 INJECTION INTRAMUSCULAR; INTRAVENOUS at 00:39

## 2022-09-27 RX ADMIN — MORPHINE SULFATE 4 MG: 4 INJECTION, SOLUTION INTRAMUSCULAR; INTRAVENOUS at 13:58

## 2022-09-27 RX ADMIN — GLYCOPYRROLATE 0.4 MG: 0.2 INJECTION INTRAMUSCULAR; INTRAVENOUS at 16:08

## 2022-09-27 RX ADMIN — GLYCOPYRROLATE 0.2 MG: 0.2 INJECTION INTRAMUSCULAR; INTRAVENOUS at 02:27

## 2022-09-27 RX ADMIN — GLYCOPYRROLATE 0.2 MG: 0.2 INJECTION INTRAMUSCULAR; INTRAVENOUS at 08:22

## 2022-09-28 PROCEDURE — 25010000002 MORPHINE PER 10 MG: Performed by: INTERNAL MEDICINE

## 2022-09-28 RX ADMIN — GLYCOPYRROLATE 0.2 MG: 0.2 INJECTION INTRAMUSCULAR; INTRAVENOUS at 04:19

## 2022-09-28 RX ADMIN — GLYCOPYRROLATE 0.2 MG: 0.2 INJECTION INTRAMUSCULAR; INTRAVENOUS at 06:10

## 2022-09-28 RX ADMIN — GLYCOPYRROLATE 0.2 MG: 0.2 INJECTION INTRAMUSCULAR; INTRAVENOUS at 02:35

## 2022-09-28 RX ADMIN — MORPHINE SULFATE 4 MG: 4 INJECTION, SOLUTION INTRAMUSCULAR; INTRAVENOUS at 11:14

## 2022-09-28 RX ADMIN — GLYCOPYRROLATE 0.4 MG: 0.2 INJECTION INTRAMUSCULAR; INTRAVENOUS at 08:39

## 2022-09-28 RX ADMIN — LORAZEPAM 0.5 MG: 2 LIQUID ORAL at 06:10

## 2022-09-28 RX ADMIN — MORPHINE SULFATE 4 MG: 4 INJECTION, SOLUTION INTRAMUSCULAR; INTRAVENOUS at 04:19

## 2022-09-28 RX ADMIN — MORPHINE SULFATE 4 MG: 4 INJECTION, SOLUTION INTRAMUSCULAR; INTRAVENOUS at 02:35

## 2022-09-28 RX ADMIN — GLYCOPYRROLATE 0.2 MG: 0.2 INJECTION INTRAMUSCULAR; INTRAVENOUS at 00:08

## 2022-09-28 RX ADMIN — GLYCOPYRROLATE 0.4 MG: 0.2 INJECTION INTRAMUSCULAR; INTRAVENOUS at 11:14

## 2022-09-28 RX ADMIN — MORPHINE SULFATE 4 MG: 4 INJECTION, SOLUTION INTRAMUSCULAR; INTRAVENOUS at 08:39

## (undated) DEVICE — PK ENDO GI 50

## (undated) DEVICE — BITEBLOCK ENDO W/STRAP 60F A/ LF DISP

## (undated) DEVICE — SINGLE-USE BIOPSY FORCEPS: Brand: RADIAL JAW 4